# Patient Record
Sex: FEMALE | Race: WHITE | NOT HISPANIC OR LATINO | Employment: OTHER | URBAN - NONMETROPOLITAN AREA
[De-identification: names, ages, dates, MRNs, and addresses within clinical notes are randomized per-mention and may not be internally consistent; named-entity substitution may affect disease eponyms.]

---

## 2017-01-01 ENCOUNTER — HOSPITAL ENCOUNTER (OUTPATIENT)
Facility: HOSPITAL | Age: 72
Setting detail: HOSPITAL OUTPATIENT SURGERY
Discharge: HOME | End: 2017-06-05
Attending: INTERNAL MEDICINE | Admitting: INTERNAL MEDICINE

## 2017-01-01 ENCOUNTER — DOCUMENTATION (OUTPATIENT)
Dept: CARDIAC SURGERY | Facility: CLINIC | Age: 72
End: 2017-01-01

## 2017-01-01 ENCOUNTER — HOSPITAL ENCOUNTER (OUTPATIENT)
Dept: CT IMAGING | Facility: HOSPITAL | Age: 72
Discharge: HOME | End: 2017-10-12
Attending: THORACIC SURGERY (CARDIOTHORACIC VASCULAR SURGERY) | Admitting: THORACIC SURGERY (CARDIOTHORACIC VASCULAR SURGERY)

## 2017-01-01 ENCOUNTER — HOSPITAL ENCOUNTER (OUTPATIENT)
Dept: CT IMAGING | Facility: HOSPITAL | Age: 72
Discharge: HOME | End: 2017-11-13
Attending: INTERNAL MEDICINE | Admitting: INTERNAL MEDICINE

## 2017-01-01 ENCOUNTER — INFUSION (OUTPATIENT)
Dept: ONCOLOGY | Facility: HOSPITAL | Age: 72
End: 2017-01-01

## 2017-01-01 ENCOUNTER — HOSPITAL ENCOUNTER (OUTPATIENT)
Dept: NUCLEAR MEDICINE | Facility: HOSPITAL | Age: 72
Discharge: HOME | End: 2017-11-06
Attending: INTERNAL MEDICINE

## 2017-01-01 ENCOUNTER — DOCUMENTATION (OUTPATIENT)
Dept: ONCOLOGY | Facility: HOSPITAL | Age: 72
End: 2017-01-01

## 2017-01-01 ENCOUNTER — ANESTHESIA (OUTPATIENT)
Dept: GASTROENTEROLOGY | Facility: HOSPITAL | Age: 72
End: 2017-01-01

## 2017-01-01 ENCOUNTER — APPOINTMENT (OUTPATIENT)
Dept: ONCOLOGY | Facility: HOSPITAL | Age: 72
End: 2017-01-01

## 2017-01-01 ENCOUNTER — OFFICE VISIT (OUTPATIENT)
Dept: CARDIAC SURGERY | Facility: CLINIC | Age: 72
End: 2017-01-01

## 2017-01-01 ENCOUNTER — APPOINTMENT (OUTPATIENT)
Dept: CT IMAGING | Facility: HOSPITAL | Age: 72
End: 2017-01-01
Attending: INTERNAL MEDICINE

## 2017-01-01 ENCOUNTER — HOSPITAL ENCOUNTER (OUTPATIENT)
Dept: MRI IMAGING | Facility: HOSPITAL | Age: 72
Discharge: HOME | End: 2017-12-05
Attending: INTERNAL MEDICINE | Admitting: INTERNAL MEDICINE

## 2017-01-01 ENCOUNTER — HOSPITAL ENCOUNTER (OUTPATIENT)
Dept: CT IMAGING | Facility: HOSPITAL | Age: 72
End: 2017-01-01
Attending: INTERNAL MEDICINE

## 2017-01-01 ENCOUNTER — TELEPHONE (OUTPATIENT)
Dept: CARDIAC SURGERY | Facility: CLINIC | Age: 72
End: 2017-01-01

## 2017-01-01 ENCOUNTER — TRANSCRIBE ORDERS (OUTPATIENT)
Dept: ADMINISTRATIVE | Facility: HOSPITAL | Age: 72
End: 2017-01-01

## 2017-01-01 ENCOUNTER — HOSPITAL ENCOUNTER (OUTPATIENT)
Dept: CT IMAGING | Facility: HOSPITAL | Age: 72
Discharge: HOME | End: 2017-08-10
Attending: INTERNAL MEDICINE | Admitting: INTERNAL MEDICINE

## 2017-01-01 ENCOUNTER — ANESTHESIA EVENT (OUTPATIENT)
Dept: GASTROENTEROLOGY | Facility: HOSPITAL | Age: 72
End: 2017-01-01

## 2017-01-01 ENCOUNTER — HOSPITAL ENCOUNTER (OUTPATIENT)
Dept: GENERAL RADIOLOGY | Facility: HOSPITAL | Age: 72
Discharge: HOME | End: 2017-10-12

## 2017-01-01 ENCOUNTER — HOSPITAL ENCOUNTER (OUTPATIENT)
Dept: CT IMAGING | Facility: HOSPITAL | Age: 72
Discharge: HOME | End: 2017-11-06
Attending: INTERNAL MEDICINE | Admitting: INTERNAL MEDICINE

## 2017-01-01 VITALS
SYSTOLIC BLOOD PRESSURE: 156 MMHG | OXYGEN SATURATION: 96 % | TEMPERATURE: 99 F | DIASTOLIC BLOOD PRESSURE: 67 MMHG | WEIGHT: 122 LBS | RESPIRATION RATE: 20 BRPM | HEART RATE: 55 BPM | HEIGHT: 65 IN | BODY MASS INDEX: 20.33 KG/M2

## 2017-01-01 VITALS
HEART RATE: 116 BPM | OXYGEN SATURATION: 94 % | TEMPERATURE: 94 F | BODY MASS INDEX: 21.16 KG/M2 | WEIGHT: 127 LBS | DIASTOLIC BLOOD PRESSURE: 39 MMHG | HEIGHT: 65 IN | SYSTOLIC BLOOD PRESSURE: 91 MMHG | RESPIRATION RATE: 20 BRPM

## 2017-01-01 VITALS
RESPIRATION RATE: 20 BRPM | SYSTOLIC BLOOD PRESSURE: 135 MMHG | BODY MASS INDEX: 21.16 KG/M2 | DIASTOLIC BLOOD PRESSURE: 44 MMHG | WEIGHT: 127 LBS | TEMPERATURE: 97.4 F | OXYGEN SATURATION: 97 % | HEIGHT: 65 IN | HEART RATE: 67 BPM

## 2017-01-01 VITALS
DIASTOLIC BLOOD PRESSURE: 62 MMHG | WEIGHT: 124 LBS | HEIGHT: 65 IN | TEMPERATURE: 97.5 F | HEART RATE: 54 BPM | BODY MASS INDEX: 20.66 KG/M2 | RESPIRATION RATE: 18 BRPM | OXYGEN SATURATION: 94 % | SYSTOLIC BLOOD PRESSURE: 118 MMHG

## 2017-01-01 VITALS
DIASTOLIC BLOOD PRESSURE: 66 MMHG | TEMPERATURE: 97.4 F | WEIGHT: 120 LBS | OXYGEN SATURATION: 92 % | SYSTOLIC BLOOD PRESSURE: 126 MMHG | HEIGHT: 65 IN | BODY MASS INDEX: 19.99 KG/M2 | HEART RATE: 97 BPM | RESPIRATION RATE: 19 BRPM

## 2017-01-01 VITALS
RESPIRATION RATE: 18 BRPM | TEMPERATURE: 97.8 F | HEART RATE: 69 BPM | SYSTOLIC BLOOD PRESSURE: 150 MMHG | OXYGEN SATURATION: 94 % | DIASTOLIC BLOOD PRESSURE: 51 MMHG

## 2017-01-01 VITALS
OXYGEN SATURATION: 98 % | HEART RATE: 70 BPM | SYSTOLIC BLOOD PRESSURE: 140 MMHG | BODY MASS INDEX: 20.33 KG/M2 | WEIGHT: 122 LBS | DIASTOLIC BLOOD PRESSURE: 74 MMHG | HEIGHT: 65 IN

## 2017-01-01 DIAGNOSIS — C34.90 NON-SMALL CELL CARCINOMA OF LUNG (HCC): Primary | ICD-10-CM

## 2017-01-01 DIAGNOSIS — C34.11 MALIGNANT NEOPLASM OF UPPER LOBE OF RIGHT LUNG (HCC): Primary | ICD-10-CM

## 2017-01-01 DIAGNOSIS — R91.1 NODULE OF RIGHT LUNG: ICD-10-CM

## 2017-01-01 DIAGNOSIS — D50.9 IRON DEFICIENCY ANEMIA, UNSPECIFIED IRON DEFICIENCY ANEMIA TYPE: ICD-10-CM

## 2017-01-01 DIAGNOSIS — C34.11 MALIGNANT NEOPLASM OF UPPER LOBE OF RIGHT LUNG (HCC): ICD-10-CM

## 2017-01-01 DIAGNOSIS — R91.8 LUNG MASS: ICD-10-CM

## 2017-01-01 DIAGNOSIS — J98.4 CAVITATING MASS OF LUNG: Primary | ICD-10-CM

## 2017-01-01 DIAGNOSIS — J44.9 CHRONIC OBSTRUCTIVE PULMONARY DISEASE, UNSPECIFIED COPD TYPE (HCC): ICD-10-CM

## 2017-01-01 DIAGNOSIS — J18.9 PNEUMONIA: ICD-10-CM

## 2017-01-01 DIAGNOSIS — R91.1 NODULE OF RIGHT LUNG: Primary | ICD-10-CM

## 2017-01-01 DIAGNOSIS — C34.90 MALIGNANT NEOPLASM OF LUNG, UNSPECIFIED LATERALITY, UNSPECIFIED PART OF LUNG (HCC): Primary | ICD-10-CM

## 2017-01-01 DIAGNOSIS — R20.0 NUMBNESS OF TONGUE: ICD-10-CM

## 2017-01-01 DIAGNOSIS — C34.90 NON-SMALL CELL CARCINOMA OF LUNG (HCC): ICD-10-CM

## 2017-01-01 DIAGNOSIS — R20.0 NUMBNESS OF TONGUE: Primary | ICD-10-CM

## 2017-01-01 LAB
APTT PPP: 40.2 SECONDS (ref 24.1–34.8)
AUTO MIXED CELLS #: 0.7 10*3/MM3 (ref 0.1–2.6)
AUTO MIXED CELLS %: 15.9 % (ref 0.1–24)
CREAT BLDA-MCNC: 1.1 MG/DL (ref 0.6–1.3)
CREAT SERPL-MCNC: 0.98 MG/DL
CREAT SERPL-MCNC: 1.1 MG/DL
CREAT SERPL-MCNC: 1.1 MG/DL
CYTO UR: NORMAL
ERYTHROCYTE [DISTWIDTH] IN BLOOD BY AUTOMATED COUNT: 12.7 % (ref 12–15)
HCT VFR BLD AUTO: 31.2 % (ref 37–47)
HGB BLD-MCNC: 10.3 G/DL (ref 12–16)
INR PPP: 0.88 (ref 0.91–1.09)
LAB AP CASE REPORT: NORMAL
LAB AP CLINICAL INFORMATION: NORMAL
LYMPHOCYTES # BLD AUTO: 1 10*3/MM3 (ref 0.8–7)
LYMPHOCYTES NFR BLD AUTO: 23.2 % (ref 15–45)
Lab: NORMAL
MCH RBC QN AUTO: 31.3 PG (ref 28–32)
MCHC RBC AUTO-ENTMCNC: 33 G/DL (ref 33–36)
MCV RBC AUTO: 94.8 FL (ref 82–98)
NEUTROPHILS # BLD AUTO: 2.6 10*3/MM3 (ref 1.5–8.3)
NEUTROPHILS NFR BLD AUTO: 60.9 % (ref 39–78)
PATH REPORT.FINAL DX SPEC: NORMAL
PATH REPORT.GROSS SPEC: NORMAL
PLATELET # BLD AUTO: 153 10*3/MM3 (ref 130–400)
PLATELET # BLD AUTO: 229 10*3/MM3 (ref 130–400)
PMV BLD AUTO: 8.9 FL (ref 6–12)
PROTHROMBIN TIME: 12.2 SECONDS (ref 11.9–14.6)
RBC # BLD AUTO: 3.29 10*6/MM3 (ref 4.2–5.4)
WBC NRBC COR # BLD: 4.3 10*3/MM3 (ref 4.8–10.8)

## 2017-01-01 PROCEDURE — 88341 IMHCHEM/IMCYTCHM EA ADD ANTB: CPT | Performed by: THORACIC SURGERY (CARDIOTHORACIC VASCULAR SURGERY)

## 2017-01-01 PROCEDURE — 96413 CHEMO IV INFUSION 1 HR: CPT

## 2017-01-01 PROCEDURE — 96367 TX/PROPH/DG ADDL SEQ IV INF: CPT

## 2017-01-01 PROCEDURE — 25010000002 HEPARIN FLUSH (PORCINE) 100 UNIT/ML SOLUTION: Performed by: INTERNAL MEDICINE

## 2017-01-01 PROCEDURE — 25010000002 DEXAMETHASONE PER 1 MG: Performed by: INTERNAL MEDICINE

## 2017-01-01 PROCEDURE — 0 TECHNETIUM OXIDRONATE KIT: Performed by: INTERNAL MEDICINE

## 2017-01-01 PROCEDURE — 96374 THER/PROPH/DIAG INJ IV PUSH: CPT

## 2017-01-01 PROCEDURE — 85610 PROTHROMBIN TIME: CPT | Performed by: THORACIC SURGERY (CARDIOTHORACIC VASCULAR SURGERY)

## 2017-01-01 PROCEDURE — 88173 CYTOPATH EVAL FNA REPORT: CPT | Performed by: THORACIC SURGERY (CARDIOTHORACIC VASCULAR SURGERY)

## 2017-01-01 PROCEDURE — 88342 IMHCHEM/IMCYTCHM 1ST ANTB: CPT | Performed by: THORACIC SURGERY (CARDIOTHORACIC VASCULAR SURGERY)

## 2017-01-01 PROCEDURE — A9561 TC99M OXIDRONATE: HCPCS | Performed by: INTERNAL MEDICINE

## 2017-01-01 PROCEDURE — 25010000002 CARBOPLATIN PER 50 MG: Performed by: INTERNAL MEDICINE

## 2017-01-01 PROCEDURE — 85730 THROMBOPLASTIN TIME PARTIAL: CPT | Performed by: THORACIC SURGERY (CARDIOTHORACIC VASCULAR SURGERY)

## 2017-01-01 PROCEDURE — 96417 CHEMO IV INFUS EACH ADDL SEQ: CPT

## 2017-01-01 PROCEDURE — 96375 TX/PRO/DX INJ NEW DRUG ADDON: CPT

## 2017-01-01 PROCEDURE — 88305 TISSUE EXAM BY PATHOLOGIST: CPT | Performed by: THORACIC SURGERY (CARDIOTHORACIC VASCULAR SURGERY)

## 2017-01-01 PROCEDURE — 25010000002 PALONOSETRON PER 25 MCG: Performed by: INTERNAL MEDICINE

## 2017-01-01 PROCEDURE — 0 GADOBENATE DIMEGLUMINE 529 MG/ML SOLUTION: Performed by: INTERNAL MEDICINE

## 2017-01-01 PROCEDURE — A9577 INJ MULTIHANCE: HCPCS | Performed by: INTERNAL MEDICINE

## 2017-01-01 PROCEDURE — 25010000002 PACLITAXEL PROTEIN-BOUND PART PER 1 MG: Performed by: INTERNAL MEDICINE

## 2017-01-01 PROCEDURE — 25010000002 FENTANYL CITRATE (PF) 100 MCG/2ML SOLUTION: Performed by: RADIOLOGY

## 2017-01-01 PROCEDURE — 88305 TISSUE EXAM BY PATHOLOGIST: CPT | Performed by: INTERNAL MEDICINE

## 2017-01-01 PROCEDURE — 78306 BONE IMAGING WHOLE BODY: CPT

## 2017-01-01 PROCEDURE — 25010000002 DEXAMETHASONE PER 1 MG: Performed by: NURSE PRACTITIONER

## 2017-01-01 PROCEDURE — 71020 HC CHEST PA AND LATERAL: CPT

## 2017-01-01 PROCEDURE — 25010000002 MIDAZOLAM PER 1 MG: Performed by: NURSE ANESTHETIST, CERTIFIED REGISTERED

## 2017-01-01 PROCEDURE — 70553 MRI BRAIN STEM W/O & W/DYE: CPT

## 2017-01-01 PROCEDURE — 0 FLUDEOXYGLUCOSE F18 SOLUTION: Performed by: INTERNAL MEDICINE

## 2017-01-01 PROCEDURE — 74177 CT ABD & PELVIS W/CONTRAST: CPT

## 2017-01-01 PROCEDURE — 71260 CT THORAX DX C+: CPT

## 2017-01-01 PROCEDURE — 78815 PET IMAGE W/CT SKULL-THIGH: CPT

## 2017-01-01 PROCEDURE — 45385 COLONOSCOPY W/LESION REMOVAL: CPT | Performed by: INTERNAL MEDICINE

## 2017-01-01 PROCEDURE — 82565 ASSAY OF CREATININE: CPT

## 2017-01-01 PROCEDURE — 99213 OFFICE O/P EST LOW 20 MIN: CPT | Performed by: THORACIC SURGERY (CARDIOTHORACIC VASCULAR SURGERY)

## 2017-01-01 PROCEDURE — 88172 CYTP DX EVAL FNA 1ST EA SITE: CPT | Performed by: THORACIC SURGERY (CARDIOTHORACIC VASCULAR SURGERY)

## 2017-01-01 PROCEDURE — 88334 PATH CONSLTJ SURG CYTO XM EA: CPT | Performed by: THORACIC SURGERY (CARDIOTHORACIC VASCULAR SURGERY)

## 2017-01-01 PROCEDURE — 0 IOPAMIDOL 61 % SOLUTION: Performed by: INTERNAL MEDICINE

## 2017-01-01 PROCEDURE — 85025 COMPLETE CBC W/AUTO DIFF WBC: CPT

## 2017-01-01 PROCEDURE — 25010000002 PACLITAXEL PROTEIN-BOUND PART PER 1 MG: Performed by: NURSE PRACTITIONER

## 2017-01-01 PROCEDURE — 25010000002 PROPOFOL 10 MG/ML EMULSION: Performed by: NURSE ANESTHETIST, CERTIFIED REGISTERED

## 2017-01-01 PROCEDURE — 77012 CT SCAN FOR NEEDLE BIOPSY: CPT

## 2017-01-01 PROCEDURE — 71250 CT THORAX DX C-: CPT

## 2017-01-01 PROCEDURE — 25010000002 PALONOSETRON PER 25 MCG: Performed by: NURSE PRACTITIONER

## 2017-01-01 PROCEDURE — 36415 COLL VENOUS BLD VENIPUNCTURE: CPT

## 2017-01-01 PROCEDURE — 85049 AUTOMATED PLATELET COUNT: CPT | Performed by: THORACIC SURGERY (CARDIOTHORACIC VASCULAR SURGERY)

## 2017-01-01 PROCEDURE — 25010000002 MIDAZOLAM PER 1 MG: Performed by: RADIOLOGY

## 2017-01-01 PROCEDURE — A9552 F18 FDG: HCPCS | Performed by: INTERNAL MEDICINE

## 2017-01-01 RX ORDER — PROPOFOL 10 MG/ML
VIAL (ML) INTRAVENOUS AS NEEDED
Status: DISCONTINUED | OUTPATIENT
Start: 2017-01-01 | End: 2017-01-01 | Stop reason: SURG

## 2017-01-01 RX ORDER — DIPHENHYDRAMINE HYDROCHLORIDE 50 MG/ML
50 INJECTION INTRAMUSCULAR; INTRAVENOUS AS NEEDED
Status: CANCELLED
Start: 2017-01-01

## 2017-01-01 RX ORDER — LIDOCAINE HYDROCHLORIDE 20 MG/ML
INJECTION, SOLUTION INFILTRATION; PERINEURAL AS NEEDED
Status: DISCONTINUED | OUTPATIENT
Start: 2017-01-01 | End: 2017-01-01 | Stop reason: SURG

## 2017-01-01 RX ORDER — MIDAZOLAM HYDROCHLORIDE 1 MG/ML
INJECTION INTRAMUSCULAR; INTRAVENOUS
Status: COMPLETED | OUTPATIENT
Start: 2017-01-01 | End: 2017-01-01

## 2017-01-01 RX ORDER — SODIUM CHLORIDE, SODIUM LACTATE, POTASSIUM CHLORIDE, CALCIUM CHLORIDE 600; 310; 30; 20 MG/100ML; MG/100ML; MG/100ML; MG/100ML
INJECTION, SOLUTION INTRAVENOUS CONTINUOUS PRN
Status: DISCONTINUED | OUTPATIENT
Start: 2017-01-01 | End: 2017-01-01 | Stop reason: SURG

## 2017-01-01 RX ORDER — SODIUM CHLORIDE 0.9 % (FLUSH) 0.9 %
10 SYRINGE (ML) INJECTION AS NEEDED
Status: CANCELLED | OUTPATIENT
Start: 2017-01-01

## 2017-01-01 RX ORDER — SODIUM CHLORIDE 0.9 % (FLUSH) 0.9 %
10 SYRINGE (ML) INJECTION AS NEEDED
Status: DISCONTINUED | OUTPATIENT
Start: 2017-01-01 | End: 2017-01-01 | Stop reason: HOSPADM

## 2017-01-01 RX ORDER — PACLITAXEL 100 MG/20ML
145 INJECTION, POWDER, LYOPHILIZED, FOR SUSPENSION INTRAVENOUS ONCE
Status: CANCELLED | OUTPATIENT
Start: 2017-01-01

## 2017-01-01 RX ORDER — METHYLPREDNISOLONE SODIUM SUCCINATE 125 MG/2ML
125 INJECTION, POWDER, LYOPHILIZED, FOR SOLUTION INTRAMUSCULAR; INTRAVENOUS AS NEEDED
Status: CANCELLED
Start: 2017-01-01

## 2017-01-01 RX ORDER — LIDOCAINE HYDROCHLORIDE 10 MG/ML
0.5 INJECTION, SOLUTION INFILTRATION; PERINEURAL ONCE AS NEEDED
Status: COMPLETED | OUTPATIENT
Start: 2017-01-01 | End: 2017-01-01

## 2017-01-01 RX ORDER — PACLITAXEL 100 MG/20ML
145 INJECTION, POWDER, LYOPHILIZED, FOR SUSPENSION INTRAVENOUS ONCE
Status: COMPLETED | OUTPATIENT
Start: 2017-01-01 | End: 2017-01-01

## 2017-01-01 RX ORDER — SODIUM CHLORIDE 9 MG/ML
500 INJECTION, SOLUTION INTRAVENOUS CONTINUOUS PRN
Status: DISCONTINUED | OUTPATIENT
Start: 2017-01-01 | End: 2017-01-01 | Stop reason: HOSPADM

## 2017-01-01 RX ORDER — PALONOSETRON 0.05 MG/ML
0.25 INJECTION, SOLUTION INTRAVENOUS ONCE
Status: COMPLETED | OUTPATIENT
Start: 2017-01-01 | End: 2017-01-01

## 2017-01-01 RX ORDER — SODIUM CHLORIDE 0.9 % (FLUSH) 0.9 %
1-10 SYRINGE (ML) INJECTION AS NEEDED
Status: CANCELLED | OUTPATIENT
Start: 2017-01-01

## 2017-01-01 RX ORDER — ONDANSETRON 2 MG/ML
4 INJECTION INTRAMUSCULAR; INTRAVENOUS ONCE AS NEEDED
Status: DISCONTINUED | OUTPATIENT
Start: 2017-01-01 | End: 2017-01-01 | Stop reason: HOSPADM

## 2017-01-01 RX ORDER — DIPHENHYDRAMINE HYDROCHLORIDE 50 MG/ML
50 INJECTION INTRAMUSCULAR; INTRAVENOUS AS NEEDED
Status: DISCONTINUED | OUTPATIENT
Start: 2017-01-01 | End: 2017-01-01 | Stop reason: HOSPADM

## 2017-01-01 RX ORDER — METHYLPREDNISOLONE SODIUM SUCCINATE 125 MG/2ML
125 INJECTION, POWDER, LYOPHILIZED, FOR SOLUTION INTRAMUSCULAR; INTRAVENOUS AS NEEDED
Status: DISCONTINUED | OUTPATIENT
Start: 2017-01-01 | End: 2017-01-01 | Stop reason: HOSPADM

## 2017-01-01 RX ORDER — PALONOSETRON 0.05 MG/ML
0.25 INJECTION, SOLUTION INTRAVENOUS ONCE
Status: CANCELLED | OUTPATIENT
Start: 2017-01-01

## 2017-01-01 RX ORDER — LIDOCAINE HYDROCHLORIDE 10 MG/ML
INJECTION, SOLUTION INFILTRATION; PERINEURAL
Status: COMPLETED | OUTPATIENT
Start: 2017-01-01 | End: 2017-01-01

## 2017-01-01 RX ORDER — FENTANYL CITRATE 50 UG/ML
INJECTION, SOLUTION INTRAMUSCULAR; INTRAVENOUS
Status: COMPLETED | OUTPATIENT
Start: 2017-01-01 | End: 2017-01-01

## 2017-01-01 RX ORDER — SODIUM CHLORIDE 0.9 % (FLUSH) 0.9 %
1-10 SYRINGE (ML) INJECTION AS NEEDED
Status: DISCONTINUED | OUTPATIENT
Start: 2017-01-01 | End: 2017-01-01 | Stop reason: HOSPADM

## 2017-01-01 RX ORDER — DORZOLAMIDE HYDROCHLORIDE AND TIMOLOL MALEATE 20; 5 MG/ML; MG/ML
1 SOLUTION/ DROPS OPHTHALMIC 2 TIMES DAILY
COMMUNITY

## 2017-01-01 RX ORDER — FERROUS SULFATE 325(65) MG
325 TABLET ORAL EVERY 12 HOURS
COMMUNITY

## 2017-01-01 RX ORDER — SODIUM CHLORIDE 0.9 % (FLUSH) 0.9 %
3 SYRINGE (ML) INJECTION AS NEEDED
Status: DISCONTINUED | OUTPATIENT
Start: 2017-01-01 | End: 2017-01-01 | Stop reason: HOSPADM

## 2017-01-01 RX ORDER — MIDAZOLAM HYDROCHLORIDE 1 MG/ML
INJECTION INTRAMUSCULAR; INTRAVENOUS AS NEEDED
Status: DISCONTINUED | OUTPATIENT
Start: 2017-01-01 | End: 2017-01-01 | Stop reason: SURG

## 2017-01-01 RX ORDER — SODIUM CHLORIDE 9 MG/ML
9 INJECTION, SOLUTION INTRAVENOUS CONTINUOUS PRN
Status: CANCELLED | OUTPATIENT
Start: 2017-01-01

## 2017-01-01 RX ADMIN — MIDAZOLAM 1 MG: 1 INJECTION INTRAMUSCULAR; INTRAVENOUS at 13:01

## 2017-01-01 RX ADMIN — DEXAMETHASONE SODIUM PHOSPHATE 12 MG: 4 INJECTION, SOLUTION INTRAMUSCULAR; INTRAVENOUS at 09:40

## 2017-01-01 RX ADMIN — Medication 500 UNITS: at 11:55

## 2017-01-01 RX ADMIN — PACLITAXEL 145 MG: 100 INJECTION, POWDER, LYOPHILIZED, FOR SUSPENSION INTRAVENOUS at 10:11

## 2017-01-01 RX ADMIN — PALONOSETRON HYDROCHLORIDE 0.25 MG: 0.25 INJECTION INTRAVENOUS at 10:17

## 2017-01-01 RX ADMIN — SODIUM CHLORIDE 250 ML: 9 INJECTION, SOLUTION INTRAVENOUS at 09:23

## 2017-01-01 RX ADMIN — PALONOSETRON HYDROCHLORIDE 0.25 MG: 0.25 INJECTION INTRAVENOUS at 09:23

## 2017-01-01 RX ADMIN — PALONOSETRON HYDROCHLORIDE 0.25 MG: 0.25 INJECTION INTRAVENOUS at 10:52

## 2017-01-01 RX ADMIN — DEXAMETHASONE SODIUM PHOSPHATE 12 MG: 4 INJECTION, SOLUTION INTRAMUSCULAR; INTRAVENOUS at 09:26

## 2017-01-01 RX ADMIN — Medication 10 ML: at 12:50

## 2017-01-01 RX ADMIN — SODIUM CHLORIDE 250 ML: 9 INJECTION, SOLUTION INTRAVENOUS at 10:16

## 2017-01-01 RX ADMIN — MIDAZOLAM HYDROCHLORIDE 5 MG: 1 INJECTION, SOLUTION INTRAMUSCULAR; INTRAVENOUS at 07:53

## 2017-01-01 RX ADMIN — FLUDEOXYGLUCOSE F18 1 DOSE: 300 INJECTION INTRAVENOUS at 11:00

## 2017-01-01 RX ADMIN — CARBOPLATIN 260 MG: 10 INJECTION, SOLUTION INTRAVENOUS at 10:49

## 2017-01-01 RX ADMIN — Medication 10 ML: at 10:46

## 2017-01-01 RX ADMIN — LIDOCAINE HYDROCHLORIDE 40 MG: 20 INJECTION, SOLUTION INFILTRATION; PERINEURAL at 07:56

## 2017-01-01 RX ADMIN — PACLITAXEL 145 MG: 100 INJECTION, POWDER, LYOPHILIZED, FOR SUSPENSION INTRAVENOUS at 09:47

## 2017-01-01 RX ADMIN — SODIUM CHLORIDE 250 ML: 9 INJECTION, SOLUTION INTRAVENOUS at 09:55

## 2017-01-01 RX ADMIN — IOPAMIDOL 150 ML: 612 INJECTION, SOLUTION INTRAVENOUS at 08:15

## 2017-01-01 RX ADMIN — Medication 10 ML: at 11:54

## 2017-01-01 RX ADMIN — DEXAMETHASONE SODIUM PHOSPHATE 12 MG: 4 INJECTION, SOLUTION INTRAMUSCULAR; INTRAVENOUS at 10:25

## 2017-01-01 RX ADMIN — DEXAMETHASONE SODIUM PHOSPHATE 12 MG: 4 INJECTION, SOLUTION INTRAMUSCULAR; INTRAVENOUS at 10:57

## 2017-01-01 RX ADMIN — SODIUM CHLORIDE 250 ML: 9 INJECTION, SOLUTION INTRAVENOUS at 09:30

## 2017-01-01 RX ADMIN — CARBOPLATIN 260 MG: 10 INJECTION, SOLUTION INTRAVENOUS at 12:02

## 2017-01-01 RX ADMIN — PALONOSETRON HYDROCHLORIDE 0.25 MG: 0.25 INJECTION INTRAVENOUS at 09:42

## 2017-01-01 RX ADMIN — GADOBENATE DIMEGLUMINE 10 ML: 529 INJECTION, SOLUTION INTRAVENOUS at 14:45

## 2017-01-01 RX ADMIN — PROPOFOL 60 MG: 10 INJECTION, EMULSION INTRAVENOUS at 07:56

## 2017-01-01 RX ADMIN — TECHNETIUM TC 99M OXIDRONATE 1 DOSE: 3.15 INJECTION, POWDER, LYOPHILIZED, FOR SOLUTION INTRAVENOUS at 07:30

## 2017-01-01 RX ADMIN — SODIUM CHLORIDE, POTASSIUM CHLORIDE, SODIUM LACTATE AND CALCIUM CHLORIDE: 600; 310; 30; 20 INJECTION, SOLUTION INTRAVENOUS at 07:49

## 2017-01-01 RX ADMIN — Medication 10 ML: at 11:19

## 2017-01-01 RX ADMIN — Medication 500 UNITS: at 11:19

## 2017-01-01 RX ADMIN — PROPOFOL 10 MG: 10 INJECTION, EMULSION INTRAVENOUS at 08:00

## 2017-01-01 RX ADMIN — SODIUM CHLORIDE 500 ML: 9 INJECTION, SOLUTION INTRAVENOUS at 07:35

## 2017-01-01 RX ADMIN — PACLITAXEL 145 MG: 100 INJECTION, POWDER, LYOPHILIZED, FOR SUSPENSION INTRAVENOUS at 11:21

## 2017-01-01 RX ADMIN — Medication 500 UNITS: at 12:50

## 2017-01-01 RX ADMIN — LIDOCAINE HYDROCHLORIDE 10 ML: 10 INJECTION, SOLUTION INFILTRATION; PERINEURAL at 13:01

## 2017-01-01 RX ADMIN — PACLITAXEL 145 MG: 100 INJECTION, POWDER, LYOPHILIZED, FOR SUSPENSION INTRAVENOUS at 10:59

## 2017-01-01 RX ADMIN — FENTANYL CITRATE 25 MCG: 50 INJECTION, SOLUTION INTRAMUSCULAR; INTRAVENOUS at 13:01

## 2017-01-01 RX ADMIN — Medication 500 UNITS: at 10:46

## 2017-01-01 RX ADMIN — LIDOCAINE HYDROCHLORIDE 0.5 ML: 10 INJECTION, SOLUTION INFILTRATION; PERINEURAL at 07:34

## 2017-01-01 RX ADMIN — PROPOFOL 40 MG: 10 INJECTION, EMULSION INTRAVENOUS at 08:08

## 2017-01-10 ENCOUNTER — HOSPITAL ENCOUNTER (OUTPATIENT)
Dept: CT IMAGING | Facility: HOSPITAL | Age: 72
Discharge: HOME | End: 2017-01-10
Attending: INTERNAL MEDICINE | Admitting: INTERNAL MEDICINE

## 2017-01-10 DIAGNOSIS — R91.8 LUNG MASS: ICD-10-CM

## 2017-01-10 PROCEDURE — 71250 CT THORAX DX C-: CPT

## 2017-02-23 ENCOUNTER — TRANSCRIBE ORDERS (OUTPATIENT)
Dept: ADMINISTRATIVE | Facility: HOSPITAL | Age: 72
End: 2017-02-23

## 2017-02-23 DIAGNOSIS — J44.9 CHRONIC OBSTRUCTIVE PULMONARY DISEASE, UNSPECIFIED COPD TYPE (HCC): Primary | ICD-10-CM

## 2017-02-24 ENCOUNTER — HOSPITAL ENCOUNTER (INPATIENT)
Dept: HOSPITAL 58 - MEDSURG A | Age: 72
LOS: 4 days | Discharge: HOME | DRG: 153 | End: 2017-02-28
Attending: INTERNAL MEDICINE | Admitting: INTERNAL MEDICINE

## 2017-02-24 VITALS — BODY MASS INDEX: 19.3 KG/M2

## 2017-02-24 DIAGNOSIS — E78.5: ICD-10-CM

## 2017-02-24 DIAGNOSIS — M15.9: ICD-10-CM

## 2017-02-24 DIAGNOSIS — Z79.899: ICD-10-CM

## 2017-02-24 DIAGNOSIS — Z79.01: ICD-10-CM

## 2017-02-24 DIAGNOSIS — F17.210: ICD-10-CM

## 2017-02-24 DIAGNOSIS — J06.9: Primary | ICD-10-CM

## 2017-02-24 DIAGNOSIS — R19.5: ICD-10-CM

## 2017-02-24 DIAGNOSIS — I10: ICD-10-CM

## 2017-02-24 DIAGNOSIS — J44.9: ICD-10-CM

## 2017-02-24 DIAGNOSIS — F32.9: ICD-10-CM

## 2017-02-24 DIAGNOSIS — D50.0: ICD-10-CM

## 2017-02-24 DIAGNOSIS — J98.4: ICD-10-CM

## 2017-02-24 DIAGNOSIS — R06.02: ICD-10-CM

## 2017-02-24 DIAGNOSIS — I71.4: ICD-10-CM

## 2017-02-24 DIAGNOSIS — R09.02: ICD-10-CM

## 2017-02-24 LAB
ADD URINE MICROSCOPIC: YES
ALBUMIN SERPL-MCNC: 2.8 G/DL (ref 3.4–5)
ALBUMIN/GLOB SERPL: 0.65 {RATIO}
ALP SERPL-CCNC: 85 U/L (ref 53–141)
ALT SERPL-CCNC: 11 U/L (ref 12–78)
ANION GAP SERPL CALC-SCNC: 18.1 MMOL/L
AST SERPL-CCNC: 24 U/L (ref 15–37)
BASE EXCESS STD BLDA CALC-SCNC: 6 MMOL/L (ref -2–2)
BASOPHILS # BLD AUTO: 0 K/UL (ref 0–0.2)
BASOPHILS NFR BLD AUTO: 0.2 % (ref 0–3)
BILIRUB SERPL-MCNC: 0.22 MG/DL (ref 0–1.2)
BUN SERPL-MCNC: 14 MG/DL (ref 7–18)
BUN/CREAT SERPL: 17.07
CALCIUM SERPL-MCNC: 10.3 MG/DL (ref 8.2–10.2)
CHLORIDE SERPL-SCNC: 90 MMOL/L (ref 98–107)
CK SERPL-CCNC: 114 U/L
CK SERPL-CCNC: 90 U/L
CO2 BLD-SCNC: 29 MMOL/L (ref 23–31)
CO2 BLDA CALC-SCNC: 32 MMOL/L (ref 22–28)
CREAT SERPL-MCNC: 0.82 MG/DL (ref 0.6–1.3)
EOSINOPHIL # BLD AUTO: 0.1 K/UL (ref 0–0.7)
EOSINOPHIL NFR BLD AUTO: 0.7 % (ref 0–7)
FLU INTERNAL QC: (no result)
FLUAV AG NPH QL: NEGATIVE
FLUBV AG NPH QL: NEGATIVE
GFR SERPLBLD BASED ON 1.73 SQ M-ARVRAT: 68 ML/MIN
GLOBULIN SER CALC-MCNC: 4.3 G/L
GLUCOSE SERPL-MCNC: 103 MG/DL (ref 82–115)
HCO3 BLDA-SCNC: 30.7 MMOL/L (ref 22–26)
HCT VFR BLD AUTO: 30.5 % (ref 37–47)
HGB BLD-MCNC: 9.6 G/DL (ref 12–16)
IMM GRANULOCYTES NFR BLD AUTO: 1.1 % (ref 0–5)
INHALED O2 CONCENTRATION: 21 %
LYMPHOCYTES # SPEC AUTO: 1.1 K/UL (ref 0.6–3.4)
LYMPHOCYTES NFR BLD AUTO: 9.3 % (ref 10–50)
MCH RBC QN: 30.1 PG (ref 27–31)
MCHC RBC AUTO-ENTMCNC: 31.5 G/DL (ref 31.8–35.4)
MCV RBC: 95.6 FL (ref 81–99)
MONOCYTES # BLD AUTO: 1.2 K/UL (ref 0.4–2)
MONOCYTES NFR BLD AUTO: 9.7 % (ref 0–10)
MYOGLOBIN SERPL-MCNC: 60 NG/ML
MYOGLOBIN SERPL-MCNC: 83 NG/ML
NEUTROPHILS # BLD AUTO: 9.6 K/UL (ref 2–6.9)
NEUTROPHILS NFR BLD AUTO: 79 %
PCO2 BLDA: 48.7 MMHG (ref 35–45)
PH BLDA: 7.41 [PH] (ref 7.35–7.45)
PH UR: 7 [PH] (ref 5–9)
PLATELET # BLD AUTO: 319 10^3/UL (ref 140–440)
PO2 BLDA: 51 MMHG (ref 85–100)
POTASSIUM SERPL-SCNC: 4.1 MMOL/L (ref 3.5–5.1)
PROT SERPL-MCNC: 7.1 G/DL (ref 5.8–8.1)
RBC # BLD AUTO: 3.19 10^6/UL (ref 4.2–5.4)
SAO2 % BLDA FROM PO2: 85 % (ref 95–100)
SODIUM SERPL-SCNC: 133 MMOL/L (ref 136–145)
SP GR UR: 1.01 (ref 1–1.03)
TROPONIN I SERPL-MCNC: 0.05 NG/ML (ref 0–0.4)
TROPONIN I SERPL-MCNC: 0.1 NG/ML (ref 0–0.4)
WBC # BLD AUTO: 12.2 K/UL (ref 4.6–10.2)

## 2017-02-24 PROCEDURE — 82607 VITAMIN B-12: CPT

## 2017-02-24 PROCEDURE — 85045 AUTOMATED RETICULOCYTE COUNT: CPT

## 2017-02-24 PROCEDURE — 83540 ASSAY OF IRON: CPT

## 2017-02-24 PROCEDURE — 81001 URINALYSIS AUTO W/SCOPE: CPT

## 2017-02-24 PROCEDURE — 83880 ASSAY OF NATRIURETIC PEPTIDE: CPT

## 2017-02-24 PROCEDURE — 87040 BLOOD CULTURE FOR BACTERIA: CPT

## 2017-02-24 PROCEDURE — 82550 ASSAY OF CK (CPK): CPT

## 2017-02-24 PROCEDURE — 87070 CULTURE OTHR SPECIMN AEROBIC: CPT

## 2017-02-24 PROCEDURE — 80053 COMPREHEN METABOLIC PANEL: CPT

## 2017-02-24 PROCEDURE — 36415 COLL VENOUS BLD VENIPUNCTURE: CPT

## 2017-02-24 PROCEDURE — 94761 N-INVAS EAR/PLS OXIMETRY MLT: CPT

## 2017-02-24 PROCEDURE — 83874 ASSAY OF MYOGLOBIN: CPT

## 2017-02-24 PROCEDURE — 93005 ELECTROCARDIOGRAM TRACING: CPT

## 2017-02-24 PROCEDURE — 82272 OCCULT BLD FECES 1-3 TESTS: CPT

## 2017-02-24 PROCEDURE — 82746 ASSAY OF FOLIC ACID SERUM: CPT

## 2017-02-24 PROCEDURE — 93010 ELECTROCARDIOGRAM REPORT: CPT

## 2017-02-24 PROCEDURE — 82728 ASSAY OF FERRITIN: CPT

## 2017-02-24 PROCEDURE — 83550 IRON BINDING TEST: CPT

## 2017-02-24 PROCEDURE — 94640 AIRWAY INHALATION TREATMENT: CPT

## 2017-02-24 PROCEDURE — 83690 ASSAY OF LIPASE: CPT

## 2017-02-24 PROCEDURE — 84466 ASSAY OF TRANSFERRIN: CPT

## 2017-02-24 PROCEDURE — 82803 BLOOD GASES ANY COMBINATION: CPT

## 2017-02-24 PROCEDURE — 84484 ASSAY OF TROPONIN QUANT: CPT

## 2017-02-24 PROCEDURE — 85025 COMPLETE CBC W/AUTO DIFF WBC: CPT

## 2017-02-24 PROCEDURE — 82150 ASSAY OF AMYLASE: CPT

## 2017-02-24 PROCEDURE — 87804 INFLUENZA ASSAY W/OPTIC: CPT

## 2017-02-24 RX ADMIN — ENOXAPARIN SODIUM SCH MG: 30 INJECTION SUBCUTANEOUS at 19:53

## 2017-02-24 RX ADMIN — IPRATROPIUM BROMIDE AND ALBUTEROL SULFATE SCH VIAL: .5; 3 SOLUTION RESPIRATORY (INHALATION) at 17:00

## 2017-02-24 RX ADMIN — METHYLPREDNISOLONE SODIUM SUCCINATE SCH MG: 125 INJECTION, POWDER, FOR SOLUTION INTRAMUSCULAR; INTRAVENOUS at 14:26

## 2017-02-24 RX ADMIN — IPRATROPIUM BROMIDE AND ALBUTEROL SCH SPRAY: 20; 100 SPRAY, METERED RESPIRATORY (INHALATION) at 20:47

## 2017-02-24 RX ADMIN — Medication SCH SYR: at 20:51

## 2017-02-24 RX ADMIN — BENZONATATE SCH MG: 100 CAPSULE ORAL at 14:24

## 2017-02-24 RX ADMIN — Medication SCH SYR: at 14:25

## 2017-02-24 RX ADMIN — BENZONATATE SCH: 100 CAPSULE ORAL at 15:00

## 2017-02-24 RX ADMIN — SODIUM CHLORIDE SCH MLS/HR: 0.9 INJECTION, SOLUTION INTRAVENOUS at 15:15

## 2017-02-24 RX ADMIN — METHYLPREDNISOLONE SODIUM SUCCINATE SCH: 125 INJECTION, POWDER, FOR SOLUTION INTRAMUSCULAR; INTRAVENOUS at 13:00

## 2017-02-24 RX ADMIN — IPRATROPIUM BROMIDE AND ALBUTEROL SULFATE SCH VIAL: .5; 3 SOLUTION RESPIRATORY (INHALATION) at 22:44

## 2017-02-24 RX ADMIN — METHYLPREDNISOLONE SODIUM SUCCINATE SCH MG: 125 INJECTION, POWDER, FOR SOLUTION INTRAMUSCULAR; INTRAVENOUS at 21:10

## 2017-02-24 RX ADMIN — IPRATROPIUM BROMIDE AND ALBUTEROL SULFATE SCH: .5; 3 SOLUTION RESPIRATORY (INHALATION) at 13:02

## 2017-02-24 RX ADMIN — LISINOPRIL SCH MG: 10 TABLET ORAL at 20:50

## 2017-02-24 RX ADMIN — BENZONATATE SCH MG: 100 CAPSULE ORAL at 20:49

## 2017-02-24 RX ADMIN — SIMVASTATIN SCH MG: 40 TABLET, FILM COATED ORAL at 20:50

## 2017-02-24 NOTE — CT
EXAM: CT chest without contrast 

  

HISTORY:  Shortness of breath 

  

COMPARISON:  Chest x-ray same day and CT chest 09/30/2013 

  

TECHNIQUE:  Serial axial images of the chest were obtained from the lung apices to the upper abdomen
 without contrast.  These were viewed in multiple planes. 

  

FINDINGS:  The thyroid is normal.  The visualized vessels demonstrates scattered atherosclerotic dis
ease.  The pulmonary arteries are upper limit of normal for size.  The heart is normal in size witho
ut pericardial effusion.  There are mediastinal lymph nodes present with the largest precarinal lymp
h node measuring 1.3 cm in diameter. 

  

There is a right upper lobe cavitary mass with irregular thickness of the wall measuring 4.1 x 4.7 x
 5.8 cm.  The wall is significantly thickened superiorly.  This is in contact with the right lung ap
ex.  This cavitary lesion was identified in 2013, but has significantly increased in size and develo
ped a asymmetrically thickened wall.  The bilateral lungs demonstrate airway thickening with central
 lobular ground-glass nodularity.  The airways are patent.  There is no acute consolidation. 

  

Limited views of the soft tissues in the upper abdomen are unremarkable with mild atherosclerotic di
sease.  The osseous structures are normal. 

  

IMPRESSION: 

1.  Increase in right upper lobe cavitary lesion from 2013 with development of irregular and thicken
ed wall superiorly.  Throughout the remaining lungs, there is scattered small airway thickening and 
central lobular ground-glass nodularity suggestive of small airways inflammation/infection.  These f
indings are suggestive of a infectious cavitary lesion in the right upper lobe, but neoplasm cannot 
be excluded. 

2.  Mildly enlarged mediastinal lymph nodes are likely reactive. 

3.  Scattered atherosclerotic disease.

## 2017-02-24 NOTE — DI
Examination:  Single radiographic image of the chest. 

  

Comparison:  04/01/2015. 

  

Reason for study:  Shortness of breath. 

  

FINDINGS:  No pneumothorax or pleural effusion.  The images are inverted on today's examination.  Th
ere is a developing air space opacity in the right upper lobe without discrete consolidation.  The c
ardiac silhouette is not enlarged.  The imaged osseous structures are unremarkable. 

  

Impression: 

1.  Developing air space opacity in the right upper lobe.  Imaging findings can be seen with pneumon
ia and inflammation. 

2.  No pneumothorax or pleural effusion.

## 2017-02-25 RX ADMIN — HYDROCODONE POLISTIREX AND CHLORPHENIRAMINE POLISTIREX SCH ML: 10; 8 SUSPENSION, EXTENDED RELEASE ORAL at 11:50

## 2017-02-25 RX ADMIN — FERROUS SULFATE TAB EC 324 MG (65 MG FE EQUIVALENT) SCH MG: 324 (65 FE) TABLET DELAYED RESPONSE at 09:41

## 2017-02-25 RX ADMIN — ENOXAPARIN SODIUM SCH MG: 30 INJECTION SUBCUTANEOUS at 10:13

## 2017-02-25 RX ADMIN — ALPRAZOLAM PRN MG: 0.5 TABLET ORAL at 20:22

## 2017-02-25 RX ADMIN — Medication SCH SYR: at 05:08

## 2017-02-25 RX ADMIN — METHYLPREDNISOLONE SODIUM SUCCINATE SCH MG: 125 INJECTION, POWDER, FOR SOLUTION INTRAMUSCULAR; INTRAVENOUS at 05:08

## 2017-02-25 RX ADMIN — SODIUM CHLORIDE SCH MLS/HR: 0.9 INJECTION, SOLUTION INTRAVENOUS at 11:10

## 2017-02-25 RX ADMIN — HYDROCODONE BITARTRATE AND ACETAMINOPHEN PRN TAB: 7.5; 325 TABLET ORAL at 20:21

## 2017-02-25 RX ADMIN — LISINOPRIL SCH MG: 10 TABLET ORAL at 09:40

## 2017-02-25 RX ADMIN — IPRATROPIUM BROMIDE AND ALBUTEROL SULFATE SCH VIAL: .5; 3 SOLUTION RESPIRATORY (INHALATION) at 11:42

## 2017-02-25 RX ADMIN — TIMOLOL MALEATE SCH DROP: 2.5 SOLUTION OPHTHALMIC at 10:32

## 2017-02-25 RX ADMIN — PANTOPRAZOLE SODIUM SCH MG: 40 TABLET, DELAYED RELEASE ORAL at 16:52

## 2017-02-25 RX ADMIN — BENZONATATE SCH MG: 100 CAPSULE ORAL at 20:21

## 2017-02-25 RX ADMIN — IPRATROPIUM BROMIDE AND ALBUTEROL SULFATE SCH VIAL: .5; 3 SOLUTION RESPIRATORY (INHALATION) at 16:52

## 2017-02-25 RX ADMIN — Medication SCH EACH: at 10:20

## 2017-02-25 RX ADMIN — Medication SCH MG: at 15:18

## 2017-02-25 RX ADMIN — METHYLPREDNISOLONE SODIUM SUCCINATE SCH MG: 125 INJECTION, POWDER, FOR SOLUTION INTRAMUSCULAR; INTRAVENOUS at 20:20

## 2017-02-25 RX ADMIN — SIMVASTATIN SCH MG: 40 TABLET, FILM COATED ORAL at 20:21

## 2017-02-25 RX ADMIN — LISINOPRIL SCH MG: 10 TABLET ORAL at 20:21

## 2017-02-25 RX ADMIN — FENOFIBRATE SCH MG: 160 TABLET, FILM COATED ORAL at 09:40

## 2017-02-25 RX ADMIN — HYDROCODONE BITARTRATE AND ACETAMINOPHEN PRN TAB: 7.5; 325 TABLET ORAL at 10:20

## 2017-02-25 RX ADMIN — BENZONATATE SCH MG: 100 CAPSULE ORAL at 15:18

## 2017-02-25 RX ADMIN — CITALOPRAM HYDROBROMIDE SCH MG: 20 TABLET, FILM COATED ORAL at 09:41

## 2017-02-25 RX ADMIN — TRAVOPROST SCH DROP: 0.04 SOLUTION/ DROPS OPHTHALMIC at 20:36

## 2017-02-25 RX ADMIN — METHYLPREDNISOLONE SODIUM SUCCINATE SCH MG: 125 INJECTION, POWDER, FOR SOLUTION INTRAMUSCULAR; INTRAVENOUS at 13:30

## 2017-02-25 RX ADMIN — TIMOLOL MALEATE SCH DROP: 2.5 SOLUTION OPHTHALMIC at 20:22

## 2017-02-25 RX ADMIN — ALPRAZOLAM PRN MG: 0.5 TABLET ORAL at 08:14

## 2017-02-25 RX ADMIN — Medication SCH MG: at 20:21

## 2017-02-25 RX ADMIN — IPRATROPIUM BROMIDE AND ALBUTEROL SULFATE SCH VIAL: .5; 3 SOLUTION RESPIRATORY (INHALATION) at 23:05

## 2017-02-25 RX ADMIN — IPRATROPIUM BROMIDE AND ALBUTEROL SULFATE SCH VIAL: .5; 3 SOLUTION RESPIRATORY (INHALATION) at 04:45

## 2017-02-25 RX ADMIN — Medication SCH SYR: at 12:30

## 2017-02-25 RX ADMIN — IPRATROPIUM BROMIDE AND ALBUTEROL SCH SPRAY: 20; 100 SPRAY, METERED RESPIRATORY (INHALATION) at 09:43

## 2017-02-25 RX ADMIN — CLOPIDOGREL SCH MG: 75 TABLET, FILM COATED ORAL at 09:41

## 2017-02-25 RX ADMIN — CEFTRIAXONE SODIUM SCH MLS/HR: 1 INJECTION, POWDER, FOR SOLUTION INTRAMUSCULAR; INTRAVENOUS at 09:43

## 2017-02-25 RX ADMIN — HYDROCODONE POLISTIREX AND CHLORPHENIRAMINE POLISTIREX SCH ML: 10; 8 SUSPENSION, EXTENDED RELEASE ORAL at 20:20

## 2017-02-25 RX ADMIN — ASPIRIN SCH MG: 81 TABLET, COATED ORAL at 10:11

## 2017-02-25 RX ADMIN — BENZONATATE SCH MG: 100 CAPSULE ORAL at 09:41

## 2017-02-25 RX ADMIN — Medication SCH SYR: at 20:26

## 2017-02-25 RX ADMIN — IPRATROPIUM BROMIDE AND ALBUTEROL SCH SPRAY: 20; 100 SPRAY, METERED RESPIRATORY (INHALATION) at 20:20

## 2017-02-26 RX ADMIN — ENOXAPARIN SODIUM SCH MG: 30 INJECTION SUBCUTANEOUS at 09:10

## 2017-02-26 RX ADMIN — HYDROCODONE POLISTIREX AND CHLORPHENIRAMINE POLISTIREX SCH ML: 10; 8 SUSPENSION, EXTENDED RELEASE ORAL at 09:10

## 2017-02-26 RX ADMIN — Medication SCH SYR: at 05:58

## 2017-02-26 RX ADMIN — IPRATROPIUM BROMIDE AND ALBUTEROL SULFATE SCH VIAL: .5; 3 SOLUTION RESPIRATORY (INHALATION) at 17:07

## 2017-02-26 RX ADMIN — IPRATROPIUM BROMIDE AND ALBUTEROL SCH SPRAY: 20; 100 SPRAY, METERED RESPIRATORY (INHALATION) at 20:31

## 2017-02-26 RX ADMIN — LISINOPRIL SCH MG: 10 TABLET ORAL at 20:32

## 2017-02-26 RX ADMIN — METHYLPREDNISOLONE SODIUM SUCCINATE SCH MG: 125 INJECTION, POWDER, FOR SOLUTION INTRAMUSCULAR; INTRAVENOUS at 13:50

## 2017-02-26 RX ADMIN — IPRATROPIUM BROMIDE AND ALBUTEROL SCH SPRAY: 20; 100 SPRAY, METERED RESPIRATORY (INHALATION) at 09:10

## 2017-02-26 RX ADMIN — ASPIRIN SCH MG: 81 TABLET, COATED ORAL at 09:11

## 2017-02-26 RX ADMIN — IPRATROPIUM BROMIDE AND ALBUTEROL SULFATE SCH VIAL: .5; 3 SOLUTION RESPIRATORY (INHALATION) at 22:43

## 2017-02-26 RX ADMIN — Medication SCH MG: at 20:32

## 2017-02-26 RX ADMIN — METHYLPREDNISOLONE SODIUM SUCCINATE SCH MG: 125 INJECTION, POWDER, FOR SOLUTION INTRAMUSCULAR; INTRAVENOUS at 20:36

## 2017-02-26 RX ADMIN — HYDROCODONE BITARTRATE AND ACETAMINOPHEN PRN TAB: 7.5; 325 TABLET ORAL at 17:28

## 2017-02-26 RX ADMIN — CEFTRIAXONE SODIUM SCH MLS/HR: 1 INJECTION, POWDER, FOR SOLUTION INTRAMUSCULAR; INTRAVENOUS at 09:10

## 2017-02-26 RX ADMIN — BENZONATATE SCH MG: 100 CAPSULE ORAL at 09:11

## 2017-02-26 RX ADMIN — PANTOPRAZOLE SODIUM SCH MG: 40 TABLET, DELAYED RELEASE ORAL at 06:02

## 2017-02-26 RX ADMIN — LISINOPRIL SCH MG: 10 TABLET ORAL at 09:11

## 2017-02-26 RX ADMIN — BENZONATATE SCH MG: 100 CAPSULE ORAL at 20:32

## 2017-02-26 RX ADMIN — BENZONATATE SCH MG: 100 CAPSULE ORAL at 14:48

## 2017-02-26 RX ADMIN — CITALOPRAM HYDROBROMIDE SCH MG: 20 TABLET, FILM COATED ORAL at 09:11

## 2017-02-26 RX ADMIN — Medication SCH SYR: at 20:36

## 2017-02-26 RX ADMIN — FERROUS SULFATE TAB EC 324 MG (65 MG FE EQUIVALENT) SCH MG: 324 (65 FE) TABLET DELAYED RESPONSE at 09:11

## 2017-02-26 RX ADMIN — SIMVASTATIN SCH MG: 40 TABLET, FILM COATED ORAL at 20:32

## 2017-02-26 RX ADMIN — IPRATROPIUM BROMIDE AND ALBUTEROL SULFATE SCH VIAL: .5; 3 SOLUTION RESPIRATORY (INHALATION) at 11:20

## 2017-02-26 RX ADMIN — TIMOLOL MALEATE SCH DROP: 2.5 SOLUTION OPHTHALMIC at 20:31

## 2017-02-26 RX ADMIN — HYDROCODONE POLISTIREX AND CHLORPHENIRAMINE POLISTIREX SCH ML: 10; 8 SUSPENSION, EXTENDED RELEASE ORAL at 20:32

## 2017-02-26 RX ADMIN — Medication SCH EACH: at 09:11

## 2017-02-26 RX ADMIN — METHYLPREDNISOLONE SODIUM SUCCINATE SCH MG: 125 INJECTION, POWDER, FOR SOLUTION INTRAMUSCULAR; INTRAVENOUS at 05:59

## 2017-02-26 RX ADMIN — TIMOLOL MALEATE SCH DROP: 2.5 SOLUTION OPHTHALMIC at 09:10

## 2017-02-26 RX ADMIN — Medication SCH MG: at 09:10

## 2017-02-26 RX ADMIN — ALPRAZOLAM PRN MG: 0.5 TABLET ORAL at 17:29

## 2017-02-26 RX ADMIN — PANTOPRAZOLE SODIUM SCH MG: 40 TABLET, DELAYED RELEASE ORAL at 17:08

## 2017-02-26 RX ADMIN — Medication SCH SYR: at 12:28

## 2017-02-26 RX ADMIN — Medication SCH MG: at 14:59

## 2017-02-26 RX ADMIN — IPRATROPIUM BROMIDE AND ALBUTEROL SULFATE SCH VIAL: .5; 3 SOLUTION RESPIRATORY (INHALATION) at 04:53

## 2017-02-26 RX ADMIN — CLOPIDOGREL SCH MG: 75 TABLET, FILM COATED ORAL at 09:11

## 2017-02-26 RX ADMIN — FENOFIBRATE SCH MG: 160 TABLET, FILM COATED ORAL at 09:11

## 2017-02-26 RX ADMIN — TRAVOPROST SCH DROP: 0.04 SOLUTION/ DROPS OPHTHALMIC at 20:31

## 2017-02-26 RX ADMIN — SODIUM CHLORIDE SCH MLS/HR: 0.9 INJECTION, SOLUTION INTRAVENOUS at 10:23

## 2017-02-27 LAB
ALBUMIN SERPL-MCNC: 2.4 G/DL (ref 3.4–5)
ALBUMIN/GLOB SERPL: 0.77 {RATIO}
ALP SERPL-CCNC: 49 U/L (ref 53–141)
ALT SERPL-CCNC: 10 U/L (ref 12–78)
AMYLASE SERPL-CCNC: 43 U/L (ref 25–115)
ANION GAP SERPL CALC-SCNC: 12.6 MMOL/L
ARTERIAL PATENCY WRIST A: (no result)
AST SERPL-CCNC: 18 U/L (ref 15–37)
BASE EXCESS STD BLDA CALC-SCNC: 9 MMOL/L (ref -2–2)
BASOPHILS # BLD AUTO: 0 K/UL (ref 0–0.2)
BASOPHILS NFR BLD AUTO: 0.1 % (ref 0–3)
BILIRUB SERPL-MCNC: 0.12 MG/DL (ref 0–1.2)
BUN SERPL-MCNC: 21 MG/DL (ref 7–18)
BUN/CREAT SERPL: 25.92
CALCIUM SERPL-MCNC: 9.3 MG/DL (ref 8.2–10.2)
CHLORIDE SERPL-SCNC: 91 MMOL/L (ref 98–107)
CK SERPL-CCNC: 18 U/L
CO2 BLD-SCNC: 35 MMOL/L (ref 23–31)
CO2 BLDA CALC-SCNC: 35 MMOL/L (ref 22–28)
CREAT SERPL-MCNC: 0.81 MG/DL (ref 0.6–1.3)
EOSINOPHIL # BLD AUTO: 0 K/UL (ref 0–0.7)
EOSINOPHIL NFR BLD AUTO: 0 % (ref 0–7)
FERRITIN SERPL IA-MCNC: 75.44 NG/ML (ref 4.63–204)
FOLATE SERPL-MCNC: 9.5 NG/ML (ref 3.1–20.5)
GFR SERPLBLD BASED ON 1.73 SQ M-ARVRAT: 69 ML/MIN
GLOBULIN SER CALC-MCNC: 3.1 G/L
GLUCOSE SERPL-MCNC: 158 MG/DL (ref 82–115)
HCO3 BLDA-SCNC: 33.8 MMOL/L (ref 22–26)
HCT VFR BLD AUTO: 25.5 % (ref 37–47)
HEMOCCULT SP1 SPEC QL: POSITIVE
HGB BLD-MCNC: 8.1 G/DL (ref 12–16)
IMM GRANULOCYTES NFR BLD AUTO: 4.3 % (ref 0–5)
IMM RETICS/RBC NFR BLDCO AUTO: 35.1 %
INHALED O2 CONCENTRATION: 21 %
IRON SERPL-MCNC: 22 UG/DL (ref 50–170)
LIPASE SERPL-CCNC: 25 U/L (ref 8–78)
LYMPHOCYTES # SPEC AUTO: 0.5 K/UL (ref 0.6–3.4)
LYMPHOCYTES NFR BLD AUTO: 6.1 % (ref 10–50)
MCH RBC QN: 30.6 PG (ref 27–31)
MCHC RBC AUTO-ENTMCNC: 31.8 G/DL (ref 31.8–35.4)
MCV RBC: 96.2 FL (ref 81–99)
MONOCYTES # BLD AUTO: 0.2 K/UL (ref 0.4–2)
MONOCYTES NFR BLD AUTO: 2.6 % (ref 0–10)
NEUTROPHILS # BLD AUTO: 7.3 K/UL (ref 2–6.9)
NEUTROPHILS NFR BLD AUTO: 86.9 %
OCCULT BLOOD INTERNAL QC 1: (no result)
PCO2 BLDA: 51 MMHG (ref 35–45)
PH BLDA: 7.43 [PH] (ref 7.35–7.45)
PLATELET # BLD AUTO: 278 10^3/UL (ref 140–440)
PO2 BLDA: 58 MMHG (ref 85–100)
POTASSIUM SERPL-SCNC: 4.6 MMOL/L (ref 3.5–5.1)
PROT SERPL-MCNC: 5.5 G/DL (ref 5.8–8.1)
RBC # BLD AUTO: 2.65 10^6/UL (ref 4.2–5.4)
RETICS/RBC NFR: 3.35 %
SAO2 % BLDA FROM PO2: 90 % (ref 95–100)
SODIUM SERPL-SCNC: 134 MMOL/L (ref 136–145)
TIBC SERPL-MCNC: 266 UG/DL (ref 240–450)
TROPONIN I SERPL-MCNC: 0.01 NG/ML (ref 0–0.4)
WBC # BLD AUTO: 8.38 K/UL (ref 4.6–10.2)

## 2017-02-27 RX ADMIN — IPRATROPIUM BROMIDE AND ALBUTEROL SULFATE SCH: .5; 3 SOLUTION RESPIRATORY (INHALATION) at 17:00

## 2017-02-27 RX ADMIN — LISINOPRIL SCH MG: 10 TABLET ORAL at 21:37

## 2017-02-27 RX ADMIN — HYDROCODONE POLISTIREX AND CHLORPHENIRAMINE POLISTIREX SCH ML: 10; 8 SUSPENSION, EXTENDED RELEASE ORAL at 21:36

## 2017-02-27 RX ADMIN — METHYLPREDNISOLONE SODIUM SUCCINATE SCH: 125 INJECTION, POWDER, FOR SOLUTION INTRAMUSCULAR; INTRAVENOUS at 14:13

## 2017-02-27 RX ADMIN — IPRATROPIUM BROMIDE AND ALBUTEROL SCH SPRAY: 20; 100 SPRAY, METERED RESPIRATORY (INHALATION) at 09:20

## 2017-02-27 RX ADMIN — BENZONATATE SCH MG: 100 CAPSULE ORAL at 21:39

## 2017-02-27 RX ADMIN — TIMOLOL MALEATE SCH DROP: 2.5 SOLUTION OPHTHALMIC at 09:24

## 2017-02-27 RX ADMIN — Medication SCH SYR: at 05:40

## 2017-02-27 RX ADMIN — CEFTRIAXONE SODIUM SCH MLS/HR: 1 INJECTION, POWDER, FOR SOLUTION INTRAMUSCULAR; INTRAVENOUS at 09:15

## 2017-02-27 RX ADMIN — Medication SCH MG: at 21:37

## 2017-02-27 RX ADMIN — HYDROCODONE POLISTIREX AND CHLORPHENIRAMINE POLISTIREX SCH ML: 10; 8 SUSPENSION, EXTENDED RELEASE ORAL at 09:24

## 2017-02-27 RX ADMIN — METHYLPREDNISOLONE SODIUM SUCCINATE SCH MG: 125 INJECTION, POWDER, FOR SOLUTION INTRAMUSCULAR; INTRAVENOUS at 05:40

## 2017-02-27 RX ADMIN — TRAVOPROST SCH DROP: 0.04 SOLUTION/ DROPS OPHTHALMIC at 21:33

## 2017-02-27 RX ADMIN — IPRATROPIUM BROMIDE AND ALBUTEROL SULFATE SCH VIAL: .5; 3 SOLUTION RESPIRATORY (INHALATION) at 11:17

## 2017-02-27 RX ADMIN — METHYLPREDNISOLONE SCH MG: 4 TABLET ORAL at 17:12

## 2017-02-27 RX ADMIN — IPRATROPIUM BROMIDE AND ALBUTEROL SULFATE SCH VIAL: .5; 3 SOLUTION RESPIRATORY (INHALATION) at 05:03

## 2017-02-27 RX ADMIN — METHYLPREDNISOLONE SCH MG: 4 TABLET ORAL at 21:31

## 2017-02-27 RX ADMIN — FERROUS SULFATE TAB EC 324 MG (65 MG FE EQUIVALENT) SCH MG: 324 (65 FE) TABLET DELAYED RESPONSE at 09:21

## 2017-02-27 RX ADMIN — Medication SCH SYR: at 09:17

## 2017-02-27 RX ADMIN — SIMVASTATIN SCH MG: 40 TABLET, FILM COATED ORAL at 22:27

## 2017-02-27 RX ADMIN — ENOXAPARIN SODIUM SCH MG: 30 INJECTION SUBCUTANEOUS at 09:21

## 2017-02-27 RX ADMIN — HYDROCODONE BITARTRATE AND ACETAMINOPHEN PRN TAB: 7.5; 325 TABLET ORAL at 21:00

## 2017-02-27 RX ADMIN — HYDROCODONE BITARTRATE AND ACETAMINOPHEN PRN TAB: 7.5; 325 TABLET ORAL at 09:37

## 2017-02-27 RX ADMIN — PANTOPRAZOLE SODIUM SCH MG: 40 TABLET, DELAYED RELEASE ORAL at 05:40

## 2017-02-27 RX ADMIN — PANTOPRAZOLE SODIUM SCH MG: 40 TABLET, DELAYED RELEASE ORAL at 17:13

## 2017-02-27 RX ADMIN — ASPIRIN SCH MG: 81 TABLET, COATED ORAL at 09:19

## 2017-02-27 RX ADMIN — LISINOPRIL SCH MG: 10 TABLET ORAL at 09:25

## 2017-02-27 RX ADMIN — IPRATROPIUM BROMIDE AND ALBUTEROL SCH SPRAY: 20; 100 SPRAY, METERED RESPIRATORY (INHALATION) at 21:31

## 2017-02-27 RX ADMIN — TIMOLOL MALEATE SCH DROP: 2.5 SOLUTION OPHTHALMIC at 21:33

## 2017-02-27 RX ADMIN — Medication SCH MG: at 09:22

## 2017-02-27 RX ADMIN — Medication SCH MG: at 14:57

## 2017-02-27 RX ADMIN — IPRATROPIUM BROMIDE AND ALBUTEROL SULFATE SCH VIAL: .5; 3 SOLUTION RESPIRATORY (INHALATION) at 23:29

## 2017-02-27 RX ADMIN — Medication SCH EACH: at 09:19

## 2017-02-27 RX ADMIN — BENZONATATE SCH MG: 100 CAPSULE ORAL at 14:57

## 2017-02-27 RX ADMIN — Medication SCH SYR: at 12:24

## 2017-02-27 RX ADMIN — FENOFIBRATE SCH MG: 160 TABLET, FILM COATED ORAL at 09:24

## 2017-02-27 RX ADMIN — CLOPIDOGREL SCH MG: 75 TABLET, FILM COATED ORAL at 09:23

## 2017-02-27 RX ADMIN — CITALOPRAM HYDROBROMIDE SCH MG: 20 TABLET, FILM COATED ORAL at 09:20

## 2017-02-27 RX ADMIN — Medication SCH SYR: at 21:41

## 2017-02-27 RX ADMIN — BENZONATATE SCH MG: 100 CAPSULE ORAL at 09:23

## 2017-02-28 VITALS — DIASTOLIC BLOOD PRESSURE: 70 MMHG | SYSTOLIC BLOOD PRESSURE: 146 MMHG | TEMPERATURE: 98 F

## 2017-02-28 LAB
ALBUMIN SERPL-MCNC: 2.5 G/DL (ref 3.4–5)
ALBUMIN/GLOB SERPL: 0.89 {RATIO}
ALP SERPL-CCNC: 45 U/L (ref 53–141)
ALT SERPL-CCNC: 10 U/L (ref 12–78)
ANION GAP SERPL CALC-SCNC: 10.6 MMOL/L
AST SERPL-CCNC: 15 U/L (ref 15–37)
BASOPHILS # BLD AUTO: 0 K/UL (ref 0–0.2)
BASOPHILS NFR BLD AUTO: 0.1 % (ref 0–3)
BILIRUB SERPL-MCNC: 0.17 MG/DL (ref 0–1.2)
BUN SERPL-MCNC: 23 MG/DL (ref 7–18)
BUN/CREAT SERPL: 24.46
CALCIUM SERPL-MCNC: 9.2 MG/DL (ref 8.2–10.2)
CHLORIDE SERPL-SCNC: 87 MMOL/L (ref 98–107)
CO2 BLD-SCNC: 37 MMOL/L (ref 23–31)
CREAT SERPL-MCNC: 0.94 MG/DL (ref 0.6–1.3)
EOSINOPHIL # BLD AUTO: 0 K/UL (ref 0–0.7)
EOSINOPHIL NFR BLD AUTO: 0 % (ref 0–7)
GFR SERPLBLD BASED ON 1.73 SQ M-ARVRAT: 58 ML/MIN
GLOBULIN SER CALC-MCNC: 2.8 G/L
GLUCOSE SERPL-MCNC: 113 MG/DL (ref 82–115)
HCT VFR BLD AUTO: 26.3 % (ref 37–47)
HEMOCCULT SP2 STL QL IA: (no result)
HEMOCCULT SP3 STL QL IA: (no result)
HGB BLD-MCNC: 8.4 G/DL (ref 12–16)
IMM GRANULOCYTES NFR BLD AUTO: 4.3 % (ref 0–5)
LYMPHOCYTES # SPEC AUTO: 0.8 K/UL (ref 0.6–3.4)
LYMPHOCYTES NFR BLD AUTO: 9.5 % (ref 10–50)
MCH RBC QN: 30.7 PG (ref 27–31)
MCHC RBC AUTO-ENTMCNC: 31.9 G/DL (ref 31.8–35.4)
MCV RBC: 96 FL (ref 81–99)
MONOCYTES # BLD AUTO: 0.7 K/UL (ref 0.4–2)
MONOCYTES NFR BLD AUTO: 7.8 % (ref 0–10)
NEUTROPHILS # BLD AUTO: 6.6 K/UL (ref 2–6.9)
NEUTROPHILS NFR BLD AUTO: 78.3 %
OCCULT BLOOD INTERNAL QC 2: (no result)
OCCULT BLOOD INTERNAL QC 3: (no result)
PLATELET # BLD AUTO: 299 10^3/UL (ref 140–440)
POTASSIUM SERPL-SCNC: 4.6 MMOL/L (ref 3.5–5.1)
PROT SERPL-MCNC: 5.3 G/DL (ref 5.8–8.1)
RBC # BLD AUTO: 2.74 10^6/UL (ref 4.2–5.4)
SODIUM SERPL-SCNC: 130 MMOL/L (ref 136–145)
WBC # BLD AUTO: 8.42 K/UL (ref 4.6–10.2)

## 2017-02-28 RX ADMIN — Medication SCH SYR: at 05:54

## 2017-02-28 RX ADMIN — CITALOPRAM HYDROBROMIDE SCH MG: 20 TABLET, FILM COATED ORAL at 08:54

## 2017-02-28 RX ADMIN — Medication SCH MG: at 08:53

## 2017-02-28 RX ADMIN — IPRATROPIUM BROMIDE AND ALBUTEROL SCH SPRAY: 20; 100 SPRAY, METERED RESPIRATORY (INHALATION) at 08:56

## 2017-02-28 RX ADMIN — CEFTRIAXONE SODIUM SCH MLS/HR: 1 INJECTION, POWDER, FOR SOLUTION INTRAMUSCULAR; INTRAVENOUS at 08:52

## 2017-02-28 RX ADMIN — PANTOPRAZOLE SODIUM SCH MG: 40 TABLET, DELAYED RELEASE ORAL at 06:00

## 2017-02-28 RX ADMIN — IPRATROPIUM BROMIDE AND ALBUTEROL SULFATE SCH VIAL: .5; 3 SOLUTION RESPIRATORY (INHALATION) at 11:10

## 2017-02-28 RX ADMIN — ALPRAZOLAM PRN MG: 0.5 TABLET ORAL at 03:15

## 2017-02-28 RX ADMIN — HYDROCODONE POLISTIREX AND CHLORPHENIRAMINE POLISTIREX SCH ML: 10; 8 SUSPENSION, EXTENDED RELEASE ORAL at 08:58

## 2017-02-28 RX ADMIN — Medication SCH EACH: at 08:54

## 2017-02-28 RX ADMIN — ASPIRIN SCH MG: 81 TABLET, COATED ORAL at 08:53

## 2017-02-28 RX ADMIN — METHYLPREDNISOLONE SCH MG: 4 TABLET ORAL at 05:54

## 2017-02-28 RX ADMIN — FENOFIBRATE SCH MG: 160 TABLET, FILM COATED ORAL at 08:58

## 2017-02-28 RX ADMIN — LISINOPRIL SCH MG: 10 TABLET ORAL at 08:54

## 2017-02-28 RX ADMIN — TIMOLOL MALEATE SCH DROP: 2.5 SOLUTION OPHTHALMIC at 08:58

## 2017-02-28 RX ADMIN — ENOXAPARIN SODIUM SCH MG: 30 INJECTION SUBCUTANEOUS at 08:55

## 2017-02-28 RX ADMIN — FERROUS SULFATE TAB EC 324 MG (65 MG FE EQUIVALENT) SCH MG: 324 (65 FE) TABLET DELAYED RESPONSE at 08:53

## 2017-02-28 RX ADMIN — CLOPIDOGREL SCH MG: 75 TABLET, FILM COATED ORAL at 08:54

## 2017-02-28 RX ADMIN — IPRATROPIUM BROMIDE AND ALBUTEROL SULFATE SCH VIAL: .5; 3 SOLUTION RESPIRATORY (INHALATION) at 05:44

## 2017-02-28 RX ADMIN — BENZONATATE SCH MG: 100 CAPSULE ORAL at 08:54

## 2017-02-28 NOTE — PN
DATE OF SERVICE:  02/26/17



SUBJECTIVE:  

The patient is a 71 year old white female hospitalized with upper respiratory 
tract infection and shortness of breath on minimal exertion. The patient has 
acute pneumonitis. 



REVIEW OF SYSTEMS: 

CONSTITUTIONAL: No night sweats. Still fatigue. No fever or chills.

HEENT: Eyes: No visual changes. No eye pain. No eye discharge. ENT: No runny 
nose. No epistaxis. No sinus pain. No sore throat. No odynophagia. No 
congestion.

RESPIRATORY: Mild cough with congestion. No hemoptysis.

CARDIOVASCULAR: No angina symptoms. No CHF symptoms. No atypical chest pain for 
CAD. No palpitations. Exertional shortness of breath more than usual lately. No 
PND. No orthopnea. 

GASTROINTESTINAL: No abdominal pain. No nausea or vomiting. No diarrhea or 
constipation. No hematemesis. No hematochezia. Appetite is improving. 

GENITOURINARY: No urgency. No frequency. No dysuria. No hematuria. No 
obstructive symptoms. No discharge. No pain. No significant abnormal bleeding.

MUSCULOSKELETAL: No musculoskeletal pain; no joint swelling. 

NEUROLOGICAL:  No headache. No neck pain. No syncope. No seizures. No dizziness.

PSYCHIATRIC: Not anxious. No depression. No suicidal thoughts. No homicidal 
thoughts.

SKIN:  No rash. No lesions. No wounds.

ENDOCRINE: No unexplained weight loss. No weight gain. 

HEMATOLOGIC/LYMPHATIC: No anemia. No purpura. No petechiae. No prolonged or 
excessive bleeding. No palpable lymph nodes.



PHYSICAL EXAMINATION: 

GENERAL:  The patient is oriented to time, place and person. 

VITAL SIGNS:  Temperature 97.8, pulse 100, respiratory rate 21, blood pressure 
123/65 and pulse ox 91% with oxygen. 

HEENT:  Head normocephalic, atraumatic.  Eyes: Extraocular muscles are intact.  
Pupils are equal, round and reactive to light and accommodation.  Ears:  No 
lesions.  Nose appeared normal. Throat: No exudate or erythema.

NECK: Supple. No JVD, no carotid bruit.  No lymphadenopathy or thyromegaly. 

LUNGS: Decreased breath sounds but clear to auscultation.  Percussion note 
normal.  Chest symmetrical. 

HEART:  S1, S2, no S3. No murmurs.  No cyanosis or clubbing.  No ascites.  
Pulses: Dorsalis pedis and posterior tibial pulses +1 to +2 both sides. 

ABDOMEN:  Soft.  Nontender.  Bowel sounds active. No CVA tenderness. No mass 
felt. 

EXTREMITIES:  No edema.  Full range of motion of all extremities, equal. 

NEUROLOGIC:  No focal deficit. Cranial nerves II through XII are grossly 
intact.  No headache, no double vision or headache. 

SKIN: Not dry.  Intact.  Turgor - normal. 

LYMPHATIC:  No palpable lymph nodes/no lymphedema. 

MUSCULOSKELETAL:  Normal joints with no swelling. Muscle tone is normal. 



LABS:

Hgb 9.6, hct 30, WBC 12,000 normal differential, creatinine 0.8, BUN 14, 
potassium 4.1 and . 



ASSESSMENT:

1. Acute respiratory failure 

2. Acute bronchitis 

3. Severe chronic lung disease

4. Cavitary lesion on the right lung, being followed by pulmonary MD. 

5. Depression 

6. Hypertension 

7. Dyslipidemia 

8. Generalized osteoarthritis 

 

PLAN:

1. Continue NEBS treatment

2. Continue Steroids

3. Continue IV antibiotics

4. IV Lasix was given yesterday because of the swelling of the face and the 
generalized swelling likely from fluid retention from steroid therapy. 

5. Continue antibiotics

6. Will do echocardiogram 

7. Daily CBC and CMP 

8. Will repeat BNP. 



CONDITION: Stable. 



TIME SPENT:  More than 30 minutes. 



Plan and coordination of the patient's care discussed in the presence of nurse. 
HOMERO

## 2017-02-28 NOTE — CM.DICTOOL
ADMISSION: 


02/24/17 11:34





DISCHARGE: 


FEB. 28, 2017











DATE OF SERVICE: 02/28/17


FINAL DIAGNOSIS





Shortness of breath 


URI (upper respiratory infection)


Anemia


Hypoexemia


COPD


Continued Smoking


Hypertension


Dyslipidemia


Depression


DJD spine


Infrarenal Fusiform Abdominal Aortic Aneurysm, 3.3 cm per CT (noted in 2013, 

2016 and 2017)





Hysterectomy


Left Breast Lumpectomy





LAST VITALS











Temp Pulse Resp BP Pulse Ox


 


 98.0 F   62   20   146/70 H  92 L


 


 02/28/17 10:00  02/28/17 10:00  02/28/17 10:00  02/28/17 10:00  02/28/17 10:00








ACTIVE HOME MEDICATIONS





Acetaminophen/Hydrocodone Bitart (Norco 7.5-325)  1 tab PO BID PRN


   PRN Reason: pain


   Last Admin: 02/27/17 21:00 Dose:  1 tab


Albuterol/Ipratropium (Combivent Respimat Inhal Spray)  1 spray IH BID [facility]


   Last Admin: 02/28/17 08:56 Dose:  1 spray


Alprazolam (Xanax)  0.5 mg PO TID PRN


   PRN Reason: ANXIETY


   Last Admin: 02/28/17 03:15 Dose:  0.5 mg


Aspirin (Aspirin Ec)  81 mg PO DAILYWM [facility]


   Last Admin: 02/28/17 08:53 Dose:  81 mg


Calcium/Vitamin D (Calcium 500 + Vit D 200 Mg Tablet)  1 each PO DAILY [facility]


   Last Admin: 02/28/17 08:54 Dose:  1 each


Citalopram Hydrobromide (Celexa)  20 mg PO DAILY [facility]


   Last Admin: 02/28/17 08:54 Dose:  20 mg


Clopidogrel Bisulfate (Plavix)  75 mg PO DAILY [facility]


   Last Admin: 02/28/17 08:54 Dose:  75 mg


Fenofibrate (Triglide)  160 mg PO DAILY [facility]


   Last Admin: 02/28/17 08:58 Dose:  160 mg


Ferrous Sulfate (Ferrous Sulfate)  324 mg PO DAILY [facility]


   Last Admin: 02/28/17 08:53 Dose:  324 mg


Fish Oil (Omega-3 Fish Oil)  1,000 mg PO TID [facility]


   Last Admin: 02/28/17 08:53 Dose:  1,000 mg


Lisinopril (Zestril)  20 mg PO BID [facility]


   Last Admin: 02/28/17 08:54 Dose:  20 mg


Methylprednisolone (Medrol Dosepak)  4 mg PO 1300 VEENA


   PRN Reason: Taper


   Stop: 03/04/17 09:29


   Last Admin: 02/28/17 05:54 Dose:  4 mg


Nitroglycerin (Nitrostat)  0.4 mg SL Q5MIN X 3 DOSES PRN


   PRN Reason: Chest Pain


   Last Admin: 02/27/17 10:05 Dose:  0.4 mg


Pantoprazole Sodium (Protonix)  40 mg PO BIDAC [facility]


   Last Admin: 02/28/17 06:00 Dose:  40 mg


Simvastatin (Zocor)  40 mg PO BEDTIME [facility]


   Last Admin: 02/27/17 22:27 Dose:  40 mg


Timolol Maleate (Timoptic 0.25% Opth)  1 drop OP BID [facility]


   Last Admin: 02/28/17 08:58 Dose:  1 drop


Travoprost (Travatan Z)  1 drop OP BEDTIME [facility]


   Last Admin: 02/27/17 21:33 Dose:  1 drop





ALLERGIES





No Known Allergies Allergy (Unverified 02/25/17 02:42)





 








NEW PRESCRIPTIONS: 


Keflex 500 mg BID for 5 days


Please complete the hospital issued Medrol Dose Pack according to direction.








SMOKING: 


Advised to stop smoking








DISEASE SPECIFIC EDUCATION: 


Smoking


COPD


Anemia


Prescriptions


Appointments








LAB REVIEW:





 02/28/17 05:00 





 02/28/17 05:00 











02/28/17 05:00: WBC 8.42, RBC 2.74 L, Hgb 8.4 L, Hct 26.3 L, MCV 96.0, MCH 30.7

, MCHC 31.9, RDW Coeff of Federico 15.7 H, Plt Count 299, Immature Gran % (Auto) 4.3

, Neut % (Auto) 78.3, Lymph % (Auto) 9.5 L, Mono % (Auto) 7.8, Eos % (Auto) 0.0

, Baso % (Auto) 0.1, Immature Gran # (Auto) 0.4, Neut # 6.6, Lymph # 0.8, Mono 

# 0.7, Eos # 0.0, Baso # 0.0, Sodium 130 L, Potassium 4.6, Chloride 87 L, 

Carbon Dioxide 37 H, Anion Gap 10.6, BUN 23 H, Creatinine 0.94, Estimated GFR (

MDRD) 58.00, BUN/Creatinine Ratio 24.46, Glucose 113, Calcium 9.2, Total 

Bilirubin 0.17, AST 15, ALT 10 L, Alkaline Phosphatase 45 L, Total Protein 5.3 L

, Albumin 2.5 L, Globulin 2.8, Albumin/Globulin Ratio 0.89


02/27/17 10:45: Amylase 43, Lipase 25


02/27/17 09:10: Stool Occult Blood #2 No specimen received, Stool Occult Blood #

3 No specimen received





PLAN: 


Discharge home





Diet: Regular


Activity: Gradually resume as tolerated





Medication changes:


Increase Ferrous sulfate (iron) to twice daily





Appointments:





Dr. Bailey on March 6th at 9 am.  Blood work will be done to check the CBC


Dr. Rojo ([facility]) on March 7th at 1:15 pm





Ms. Greenberg is alert and oriented x 3.  She is independent with ADL'S and is 

ambulatory without use of assistive device. Ms. Greenberg is the primary caregiver 

for her brother who lives in the home with her. Meal intakes are good at 20-75%

. She denies nausea or abdominal pain.  A stool for occult blood was positive 

and an appointment has been made with Dr. Rjoo for evaluation for 

colonoscopy and endoscopy.  She is advised to stop smoking. Skin condition is 

good, no open sores, rashes or other irritation. 








____________________


Almas Bailey MD

## 2017-02-28 NOTE — PCM.PROG
Attending Provider: 


ATTENDING PROVIDER:


Dr. MOY URBAN





DATE OF SERVICE:  02/28/17





SUBJECTIVE: 


This 71 year old WHITE/ F was hospitalized 02/24/17. The patient is 

admitted with COPD exacerbation and bronchitis. Hemoglobin dropped from 9.2 to 

8.4.  Stool for occult blood is positive. CT scan did not show any colonic 

mass. The patient has never had a colonoscopy, The patient was explained the 

importance of having the colonoscopy and is agreeable. Breathing is better; she 

is still coughing.  





REVIEW OF SYSTEMS:


CONSTITUTIONAL: No fever, no chills.


ENDOCRINE: No weight loss or weight gain.


HEENT: No sinus drainage, no sore throat.


CVS: No angina symptoms. No CHF symptoms.  No palpitations.  No atypical chest 

pain for CAD.  No shortness of breath.


RESPIRATORY: Cough.  No hemoptysis.


GI: No melena.  No abdominal pain.  No nausea, no vomiting.


: No hematuria.  No polyuria.


SKIN: No rash.  No wounds.


MUSCULOSKELETAL: No pain.


CNS: No blackout, no dizziness.  No headache.  No double vision.


PSYCHIATRIC: Not anxious; no depression.  No suicidal thoughts.  No homicidal 

thoughts.





PHYSICAL EXAMINATION:


GENERAL:  Lying in bed in no distress.


VITAL SIGNS:  Temperature 97.1 F, Pulse 67, Respiratory Rate 20, /70, 

Pulse Ox 98%


HEENT:  Normocephalic, atraumatic.  Mucosa is dry, pallor positive.


NECK:  No JVP, no carotid bruit.  No lymphadenopathy.


CARDIAC:  S1, S2, no S3.  No murmur, gallop or regurgitation.


LUNGS:  Decreased with some crackles, expiratory wheezing but is better. 


ABDOMEN:  Soft, non-tender.  Bowel sounds active.  No rigidity, guarding or CVA 

tenderness.


EXTREMITIES:  No clubbing, cyanosis or edema.  


NEUROLOGIC: Awake, alert and oriented x3.


LYMPHATIC: No palpable lymph nodes


SKIN: Not dry.  Intact.


MUSCULOSKELETAL: No joint swelling.








LAB REVIEW:





 02/28/17 05:00 





 02/28/17 05:00 











02/28/17 05:00: WBC 8.42, RBC 2.74 L, Hgb 8.4 L, Hct 26.3 L, MCV 96.0, MCH 30.7

, MCHC 31.9, RDW Coeff of Federico 15.7 H, Plt Count 299, Immature Gran % (Auto) 4.3

, Neut % (Auto) 78.3, Lymph % (Auto) 9.5 L, Mono % (Auto) 7.8, Eos % (Auto) 0.0

, Baso % (Auto) 0.1, Immature Gran # (Auto) 0.4, Neut # 6.6, Lymph # 0.8, Mono 

# 0.7, Eos # 0.0, Baso # 0.0, Sodium 130 L, Potassium 4.6, Chloride 87 L, 

Carbon Dioxide 37 H, Anion Gap 10.6, BUN 23 H, Creatinine 0.94, Estimated GFR (

MDRD) 58.00, BUN/Creatinine Ratio 24.46, Glucose 113, Calcium 9.2, Total 

Bilirubin 0.17, AST 15, ALT 10 L, Alkaline Phosphatase 45 L, Total Protein 5.3 L

, Albumin 2.5 L, Globulin 2.8, Albumin/Globulin Ratio 0.89


02/27/17 10:45: Total Creatine Kinase 18, Troponin I 0.0150, Amylase 43, Lipase 

25


02/27/17 09:10: Stl Occult Blood (IFOB) Positive, Stool Occult Blood #2 No 

specimen received, Stool Occult Blood #3 No specimen received


02/27/17 04:25: Reticulocyte % (Auto) 3.35, Absolute Retic 0.0905, Retic Hgb 

Equivalent 29.4, Iron 22 L, TIBC 266, % Saturation 8, Unsat Iron Binding 244, 

Ferritin 75.44, Vitamin B12 > 2000 H, Folate 9.5





ASSESSMENT:  


1.  COPD exacerbation secondary to bronchitis


2.  Hypoxemia secondary to above


3.  Anemia with positive occult blood


4.  Hypertension


5.  Dyslipidemia


6.  Nicotine use





PLAN:


1.  Discharge home


2.  Keflex 500 mg b.i.d. for 5 days


3.  Prednisone 10 mg twice each day for five days


4.  Evaluate for home oxygen


5.  Nicotine use and its side effects discussed with the patient to include 

risk of various cancers, lung, colon, et cetera. 


6.  Followup in the office within 5 days


7.  Will recheck hemoglobin





Plan and coordination of the patient's care discussed in the presence of Case 

Manager and nurse.





EDUCATION:


 Nicotine use and its side effects discussed with the patient to include risk 

of various cancers, lung, colon, et cetera. Advised the patient to quit.  Also 

discussed with the patient concerning the need for colonoscopy.  She is advised 

to have one.  She has agreed for this procedure.  The patient states that she 

has seen Dr. Smith in the past. Ilana,   will get referral.  The 

patient voices understanding and agrees.  








CONDITION:  STABLE











SCRIBED BY:


IRIS CANELA  scribed while in presence of service performed by 

Dr. MOY URBAN on 02/28/17 (0805)

## 2017-02-28 NOTE — PN
DATE OF SERVICE:  02/25/17



SUBJECTIVE:  

The patient is a 71 year old white female hospitalized with upper respiratory 
tract infection and shortness of breath with bronchitis. The patient has been a 
smoker and also has cavitary mass, lesion likely neoplasm followed by pulmonary 
physician. The patient's CT scan of the chest was repeated and showed increased 
right upper lobe cavitary lesion from 2013. The possibility of neoplasm or 
infectious cavity lesion was noted. It is to be noted that the patient has no 
symptoms of tuberculosis. Her weight has been stable and she doesn't have low 
grade fever and in fact she doesn't have any hemoptysis. 



REVIEW OF SYSTEMS: 

CONSTITUTIONAL: No night sweats. No fatigue, malaise, lethargy. No fever or 
chills.

HEENT: Eyes: No visual changes. No eye pain. No eye discharge. ENT: No runny 
nose. No epistaxis. No sinus pain. No sore throat. No odynophagia. No 
congestion. Hoariness of the voice. 

RESPIRATORY: mild  cough and congestion. No hemoptysis. She says that she 
practically coughed all night. The cough was dry. No PND. No orthopnea. 

CARDIOVASCULAR: No angina symptoms. No CHF symptoms. No atypical chest pain for 
CAD. No palpitations. No shortness of breath. The patient has pleuritic type of 
pain at times. 

GASTROINTESTINAL: No abdominal pain. No nausea or vomiting. No diarrhea or 
constipation. No hematemesis. No hematochezia. Appetite is better. 

GENITOURINARY: No urgency. No frequency. No dysuria. No hematuria. No 
obstructive symptoms. No discharge. No pain. No significant abnormal bleeding.

MUSCULOSKELETAL: No musculoskeletal pain; no joint swelling. 

NEUROLOGICAL:  No headache. No neck pain. No syncope. No seizures. No dizziness.

PSYCHIATRIC: Not anxious. No depression. No suicidal thoughts. No homicidal 
thoughts.

SKIN:  No rash. No lesions. No wounds.

ENDOCRINE: No unexplained weight loss. No weight gain. 

HEMATOLOGIC/LYMPHATIC: No anemia. No purpura. No petechiae. No prolonged or 
excessive bleeding. No palpable lymph nodes.



PHYSICAL EXAMINATION: 

GENERAL:  The patient is oriented to time, place and person.  

VITAL SIGNS:  Temperature 97.5, pulse 80, respiratory rate 22, blood pressure 
143/65 and pulse ox 99%.

HEENT:  Head normocephalic, atraumatic.  Eyes: Extraocular muscles are intact.  
Pupils are equal, round and reactive to light and accommodation.  Ears:  No 
lesions.  Nose appeared normal. Throat: No exudate or erythema.

NECK: Supple. No JVD, no carotid bruit.  No lymphadenopathy or thyromegaly. 

LUNGS: Decreased breath sounds with mild wheeze.  Clear to auscultation.  
Percussion note normal.  Chest symmetrical. 

HEART:  S1, S2, no S3. No murmurs.  No cyanosis or clubbing.  No ascites.  
Pulses: Dorsalis pedis and posterior tibial pulses +1 to +2 both sides. 

ABDOMEN:  Soft.  Nontender.  Bowel sounds active. No CVA tenderness. No mass 
felt. 

EXTREMITIES:  No edema.  Full range of motion of all extremities, equal. 

NEUROLOGIC:  No focal deficit. Cranial nerves II through XII are grossly 
intact.  No headache, no double vision or headache. 

SKIN: Not dry.  Intact.  Turgor - normal. 

LYMPHATIC:  No palpable lymph nodes/no lymphedema. 

MUSCULOSKELETAL:  Normal joints with no swelling. Muscle tone is normal. 



LABS:

Hgb 9.6, hct 30, WBC 12,000 normal differential, creatinine 0.8, BUN 14, 
potassium 4.1 these labs were done yesterday. 



ASSESSMENT:

1. Acute bronchitis with chronic lung disease with history of smoking. 

2. Cavitary lesion present on the right upper cavity for long time, being 
followed by pulmonary MD

3. Depression 

4. Dyslipidemia 

5. Hypertension 

6. Generalized osteoarthritis



PLAN:

1. Continue DUO NEBS

2. Continue Inhalers

3. Continue IV antibiotics

4. Continue IV steroids 

5. Continue Oxygen supplement 

6. Will add Tussionex 1 teaspoon twice a day for coughing



CONDITION: Stable



Counseling for smoking done. 



TIME SPENT:  More than 30 minutes. 



Plan and coordination of the patient's care discussed in the presence of nurse. 
HOMERO

## 2017-03-01 NOTE — HP
DATE OF SERVICE:  02/24/17



REASON FOR HOSPITALIZATION: 

Cough, fever, aching all over. 



HISTORY OF PRESENT ILLNESS:

This is a 71-year-old female with complaints of being sick for 2 weeks with 
fever and chills, getting yellow-green sputum, lots of sinus drainage, sore 
throat, hurting all over, headache, shortness of breath even at rest. 



REVIEW OF SYSTEMS:

CONSTITUTIONAL:  Fever; fatigue. 

HEENT:  Sinus drainage. No sore throat.  

RESPIRATORY:  Cough. No congestion. 

CARDIOVASCULAR:  Shortness of breath is present.  No atypical chest pain for 
coronary artery disease.  No angina, CHF symptoms, palpitations.   

GASTROINTESTINAL:  No melena or abdominal pain.  No GERD.

GENITOURINARY:  No hematuria,  no polyuria.

CNS:  No blackout, no dizziness, no headache, no double vision.  

MUSCULOSKELETAL:  Osteoarthritis pain.  No joint swelling.

ENDOCRINE:  No weight loss, no weight gain.

SKIN:  Not dry, no rash.

PSYCHIATRIC:  Anxious.  No depression, no suicidal thoughts, no homicidal 
thoughts. 



PAST MEDICAL HISTORY: 

1.   COPD

2.   CONTINUED SMOKING

3.   DJD SPINE, SEVERE

4.   DYSLIPIDEMIA

5.   HYPERTENSION

6.   DEPRESSION

7.   PERIPHERAL ARTERIAL DISEASE 



PAST SURGICAL HISTORY:

1.  HYSTERECTOMY

2.  CEA BILATERALLY

3.  LEFT BREAST LUMPECTOMY



SOCIAL HISTORY: 

The patient smokes, two packs.  No alcohol use.  . 



FAMILY HISTORY: 

COPD, hypertension. 



MEDICATIONS: (HOME)

1.  Fenofibrate 160 mg one tablet p.o. once daily

2.  Citalopram 20 mg 1 1/2 tablet p.o. once a day

3.  Plavix 75 mg one p.o. daily

4.  Lisinopril 20 mg one tablet p.o. two times per day

5.  Cimetidine 400 mg tablet two tablets p.o. two times per day

6.  Simvastatin 40 mg p.o. once daily in the evening

7.  Alprazolam 0.5 mg p.o. three times per day p.r.n.

8.  Combivent  mcg/actuation two puffs by inhalation route four times per 
day

9.  Hydrocodone-acetaminophen 7.5-325 mg p.o. two times per day as needed for 
pain

10. Albuterol Sulfate 2.5 mg/0.5 mL solution for nebulization four times per 
day as needed

11. Calcium 600 +D daily

12. Spiriva one capsule by inhalation once daily

13. Fish Oil 360-1,200 mg capsule p.o. three times per day

14. Travatan Z 0.004 % drops one drop into both eyes by ophthalmic route once 
daily in the evening

15. Timolol 0.25% instill one drop into both eyes by ophthalmic route two times 
per day

16. Aspirin 81 mg p.o. once daily



ALLERGIES:  NKDA



PHYSICAL EXAMINATION:

V/S:   Pulse 92, /70, temperature 100.8, 02 sat 85%. Weight 124.2 pounds. 
Height 5'5",  BMI 20.7

GENERAL APPEARANCE:  Oriented times three.  The patient is ill-appearing. Dry 
mucosa.

HEENT: Normal.

NECK: No JVP, no bruits. 

RESPIRATORY: Audible wheeze and crackles with decreased entry.

CARDIOVASCULAR:  S1, S2, no S3, no murmurs.  No cyanosis, clubbing.  No ascites.

GI/ABDOMEN:  No tenderness.  Bowel sounds are active. 

EXTREMITIES: No  edema, pulses +1, equal.

CNS:  Deep tendon reflexes, sensory, motor and gait all normal. 

RECTAL/PELVIC: Colonoscopy screening - Dr. Smith; refused repeat. Pelvic: 
Hysterectomy, CA cervix. Refused mammogram. 



ASSESSMENT:

1.   SHORTNESS OF BREATH SECONDARY TO UPPER RESPIRATORY TRACT INFECTION/
PNEUMONIA, R/O FLU

2.   HYPOXEMIA

3.   COPD - CONTINUED SMOKING

4.   DJD SPINE, SEVERE

5.   DYSLIPIDEMIA

6.   HYPERTENSION

7.   DEPRESSION

8.   PERIPHERAL ARTERIAL DISEASE - CEA



PLAN: (Admit to regular floor with telemetry protocol)

1.    CBC, CMP

2.    BNP

3.    ABG on room air

4.    CT chest with contrast

5.    Rapid flu A & B

6.    Blood culture with sputum cultures

7.    IVF at 

8.    Rocephin 1 gm IV daily

9.    Zithromycin 500 mg IV daily

10.  Duoneb q.6hr

11.  Solu-Medrol 125 mg q.8hr

12.  Tessalon Perles 200 mg q.i.d. 

13.  Continue home medications



TIME SPENT: More than 70 minutes. 
MTDD

## 2017-03-02 NOTE — ECHO2D
Date of Exam: 2/28/17   Ordering Physician: ASHLEIGH WATSON

Reason for Echo: HTN, SOB, RESPIRATORY FAILURE

Auscultation: S1, S2







M-Mode  Normal Adult Results LV Dimensions Normal Adult Results

 

AoV Opening excursions       >1.6  >1.6 LVEDD-base-     3.5-5.8  4.2

 

Ao root dimensions      2.0-3.7  2.8 LVESD-base-     3.1-4.6  

 

L. Atrium dimensions      1.9-3.8  3.8 Post. Wall thickness     0.8-1.1  1.2

 

IV septum (thickness)      0.7-1.2  1.2 Post. Wall excursion     0.72-1.3  
NORMAL

 

Septal motion   NORMAL Systolic motion   

 

R. Ventricular cavity     1.5-2.0  NORMAL LVEF       60%  53%

 

Paradoxical septal wall motion   NORMAL    



2-D : NORMAL LEFT VENTRICULAR CONTRACTILITY--NORMAL VALVES--NO EFFUSION, NO 
THROMBUS, NORMAL LEFT ATRIAL AND LEFT VENTRICLE CAVITIES





M-MODE:

MV:  NORMAL

AV:  NORMAL

TV:  NORMAL

PV:  

CHAMBER SIZE:  NORMAL

WALL MOTION:  NORMAL

PERICARDIUM:  NORMAL



INTERPRETATION:  

1. BORDERLINE LEFT VENTRICULAR HYPERTROPHY

2. NORMAL LEFT VENTRICULAR CONTRACTILITY

3. NORMAL VALVES

MTDD

## 2017-03-02 NOTE — DS
DATE OF SERVICE:  02/28/17



FINAL DIAGNOSIS: 

1. Upper respiratory infection 

2. Shortness of breath secondary to the upper respiratory infection 

3. Anemia secondary to the chronic disease and GI bleed, positive Occult blood 
test, awaiting for the GI evaluation

4. Hypoxemia which is better 

5. COPD, continued smoking 

6. Hypertension

7. Dyslipidemia 

8. Depression 

9. DJD spine 

10.Infrarenal fusiform abdominal aortic aneurysm, 3.3cm 

11.Hysterectomy

12.Left breast lumpectomy



VITAL AT THE TIME OF DISCHARGE:

Blood pressure 146/70, respiratory rate 20, heart rate 62, temperature 98.0 and 
saturation 92%.



DISCHARGE INSTRUCTIONS:

Discharge home today. Continue home medication. Followup in the office within 5-
7 days. 



MEDICATIONS AT DISCHARGE:

Norco

Combivent 

Xanax 

Aspirin

Calcium

Celexa

Plavix

Triglide

Ferrous sulfate

Omega 3

Zestril 

Protonix

Zocor

Eye drops

Travatan 



ALLERGIES:

No known allergies 



NEW PRESCRIPTIONS:

Keflex 500mg twice a day for 5 days

Please continue and finish the Medrol Dosepak from the hospital 



DIET INSTRUCTIONS: 

Regular



ACTIVITY:

Gradually resume as tolerated



SMOKING:

Advised to stop smoking 



DISEASE SPECIFIC EDUCATION:

COPD, Exacerbation and Pneumonia been discussed

Pneumonia vaccination been discussed

Anemia and the Risk of GI bleed and colon cancer discussed and she verbalized 
understanding and wants to go for the colonoscopy now, which has been scheduled 
as outpatient. 



HOSPITAL COURSE:

Kriss Greenberg who is a 71 year old female came to the office coughing, congestion
, shortness of breath and saturation was 85% on the room air, respiratory rate 
was almost 30. At that time the patient was admitted from the office directly. 
ABG showed pH 7.408, pCO2 48, pO2 51 and BNP was 505. CT of the chest showed 
the questionable inflammation and increasing in the cavitary lesion. The 
patient was already seeing Dr. Velazquez in the past for the cavitary lesion. No 
biopsy was done. The patient was put on the Rocephin, Azithromycin, Solu-Medrol 
and breathing treatment. Hgb was 9.6 and dropped to 8.1, stool for Occult blood 
test was done which is positive. Influenza negative. CT of abdomen and pelvis 
done which showed intrarenal aneurysm 3.3cm. Hgb was steady then and did not 
drop. It went up to 8.4 and the patient finally agreed to do the colonoscopy 
and today she was feeling better but stress from the patient's brother's health 
otherwise she was willing to go home. At that time she was discharged home and 
strictly explained about the anemia. Continue to take iron tablets extra pill 
and GI consultation. 



TIME SPENT: More than 45 minutes today. 

HOMERO

## 2017-03-07 ENCOUNTER — OFFICE VISIT (OUTPATIENT)
Dept: GASTROENTEROLOGY | Facility: CLINIC | Age: 72
End: 2017-03-07

## 2017-03-07 VITALS
SYSTOLIC BLOOD PRESSURE: 132 MMHG | HEART RATE: 77 BPM | BODY MASS INDEX: 20.16 KG/M2 | DIASTOLIC BLOOD PRESSURE: 68 MMHG | WEIGHT: 121 LBS | HEIGHT: 65 IN | OXYGEN SATURATION: 95 %

## 2017-03-07 DIAGNOSIS — Z79.02 PLATELET INHIBITION DUE TO PLAVIX: ICD-10-CM

## 2017-03-07 DIAGNOSIS — R19.5 HEME POSITIVE STOOL: ICD-10-CM

## 2017-03-07 DIAGNOSIS — D50.9 IRON DEFICIENCY ANEMIA, UNSPECIFIED IRON DEFICIENCY ANEMIA TYPE: Primary | ICD-10-CM

## 2017-03-07 DIAGNOSIS — I10 HTN (HYPERTENSION), BENIGN: ICD-10-CM

## 2017-03-07 DIAGNOSIS — Z78.9 NONSMOKER: ICD-10-CM

## 2017-03-07 PROCEDURE — 99204 OFFICE O/P NEW MOD 45 MIN: CPT | Performed by: CLINICAL NURSE SPECIALIST

## 2017-03-07 RX ORDER — CLOPIDOGREL BISULFATE 75 MG/1
TABLET ORAL
COMMUNITY

## 2017-03-07 RX ORDER — ESCITALOPRAM OXALATE 10 MG/1
10 TABLET ORAL DAILY
COMMUNITY

## 2017-03-07 RX ORDER — SIMVASTATIN 40 MG
TABLET ORAL
COMMUNITY

## 2017-03-07 RX ORDER — LISINOPRIL 20 MG/1
TABLET ORAL
COMMUNITY

## 2017-03-07 RX ORDER — PANTOPRAZOLE SODIUM 40 MG/1
40 TABLET, DELAYED RELEASE ORAL 2 TIMES DAILY
COMMUNITY

## 2017-03-07 RX ORDER — ALPRAZOLAM 0.5 MG/1
0.5 TABLET ORAL 3 TIMES DAILY PRN
COMMUNITY

## 2017-03-07 RX ORDER — FENOFIBRATE 160 MG/1
TABLET ORAL
COMMUNITY

## 2017-03-07 RX ORDER — HYDROCODONE BITARTRATE AND ACETAMINOPHEN 7.5; 325 MG/1; MG/1
1 TABLET ORAL 2 TIMES DAILY
COMMUNITY

## 2017-03-07 NOTE — PROGRESS NOTES
Chief Complaint   Patient presents with   • GI Problem     New patient ref by Dr. Bruner for anemia and heme + stools     Subjective   HPI  Taty Sánchez is a 71 y.o. female who presents with a complaint of anemia and heme positive stool. The anemia she has had for years she says chronicity is unknown worsening on recent routine labs. She has a hx of B12 def that she would get shots for monthly. She has not had this in months. On 2/28/2017 H/H was 8.4/26.3 MCV 96. On 2/9/2017 H/H was 11.7/36.  Iron profile showed Iron low at 22, Ferritin 75.44 B12 greater than 2000. Her stool was positive for occult blood found incidentally. New finding for her.   She has had a colonoscopy years ago with Dr Guevara he removed polyps she says but unsure of how many. No previous upper Endoscopy. She was taking Ibuprofen but it started burning her stomach and she stopped within the past 6 months. She took it daily for 2-3 months. No family hx for colon cancer.    Past Medical History   Diagnosis Date   • Anemia    • Arthritis    • COPD (chronic obstructive pulmonary disease)    • Depression    • Depression    • Dyslipidemia    • History of colonic polyps    • Hypercholesteremia    • Hypertension    • Nicotine use disorder    • Peripheral arterial disease      Past Surgical History   Procedure Laterality Date   • Hysterectomy     • Breast surgery       Lumpectomy   • Neck surgery       Carotid artery surgery   • Cataract extraction       Outpatient Prescriptions Marked as Taking for the 3/7/17 encounter (Office Visit) with MATTIE Mcconnell   Medication Sig Dispense Refill   • ALPRAZolam (XANAX) 0.5 MG tablet 3 (Three) Times a Day.     • aspirin 81 MG tablet Take 81 mg by mouth     • calcium citrate-vitamin d (CALCITRATE) 315-250 MG-UNIT tablet tablet Take 1 tablet by mouth 2 times daily (with meals)     • clopidogrel (PLAVIX) 75 MG tablet Take 75 mg by mouth daily     • escitalopram (LEXAPRO) 10 MG tablet Take 10 mg by mouth  Daily.     • fenofibrate 160 MG tablet Take 160 mg by mouth daily     • HYDROcodone-acetaminophen (NORCO) 7.5-325 MG per tablet 3 (Three) Times a Day.     • lisinopril (PRINIVIL,ZESTRIL) 20 MG tablet Take 20 mg by mouth daily     • pantoprazole (PROTONIX) 40 MG EC tablet Take 40 mg by mouth Daily.     • simvastatin (ZOCOR) 40 MG tablet Take 40 mg by mouth nightly     • travoprost, benzalkonium, (TRAVATAN) 0.004 % ophthalmic solution Place 1 drop into both eyes nightly       Allergies no known allergies  Social History     Social History   • Marital status:      Spouse name: N/A   • Number of children: N/A   • Years of education: N/A     Occupational History   • Not on file.     Social History Main Topics   • Smoking status: Former Smoker   • Smokeless tobacco: Not on file      Comment: Stopped 1 week ago   • Alcohol use No   • Drug use: Not on file   • Sexual activity: Not on file     Other Topics Concern   • Not on file     Social History Narrative     Family History   Problem Relation Age of Onset   • Colon cancer Neg Hx    • Colon polyps Neg Hx      Health Maintenance   Topic Date Due   • MEDICARE ANNUAL WELLNESS  1945   • TDAP/TD VACCINES (1 - Tdap) 08/30/1962   • ZOSTER VACCINE  08/30/2003   • PNEUMOCOCCAL VACCINES (65+ LOW/MEDIUM RISK) (1 of 2 - PCV13) 08/30/2008   • INFLUENZA VACCINE  08/01/2016     Review of Systems   Constitutional: Positive for fatigue (somewhat improved since hospitalization). Negative for activity change, appetite change, chills, diaphoresis, fever and unexpected weight change.   HENT: Negative for ear pain, hearing loss, mouth sores, sore throat, trouble swallowing and voice change.    Eyes: Negative.    Respiratory: Negative for cough, choking, shortness of breath and wheezing.    Cardiovascular: Negative for chest pain and palpitations.   Gastrointestinal: Negative for abdominal pain, blood in stool, constipation, diarrhea, nausea and vomiting.        Heme positive stool   "  Endocrine: Negative for cold intolerance and heat intolerance.   Genitourinary: Negative for decreased urine volume, dysuria, frequency, hematuria and urgency.   Musculoskeletal: Negative for back pain, gait problem and myalgias.   Skin: Negative for color change, pallor and rash.   Allergic/Immunologic: Negative for food allergies and immunocompromised state.   Neurological: Negative for dizziness, tremors, seizures, syncope, weakness, light-headedness, numbness and headaches.   Hematological: Negative for adenopathy. Does not bruise/bleed easily.   Psychiatric/Behavioral: Negative for agitation and confusion. The patient is not nervous/anxious.    All other systems reviewed and are negative.    Objective   Vitals:    03/07/17 1322   BP: 132/68   Pulse: 77   SpO2: 95%   Weight: 121 lb (54.9 kg)   Height: 65\" (165.1 cm)     Body mass index is 20.14 kg/(m^2).  Physical Exam   Constitutional: She is oriented to person, place, and time. She appears well-developed and well-nourished.   HENT:   Head: Normocephalic and atraumatic.   Eyes: Pupils are equal, round, and reactive to light.   Neck: Normal range of motion. Neck supple. No tracheal deviation present.   Cardiovascular: Normal rate, regular rhythm and normal heart sounds.  Exam reveals no gallop and no friction rub.    No murmur heard.  Pulmonary/Chest: Effort normal. No respiratory distress. She has wheezes (left side exp wheezes noted). She has no rales. She exhibits no tenderness.   Abdominal: Soft. Bowel sounds are normal. She exhibits no distension. There is no hepatosplenomegaly. There is no tenderness. There is no rigidity, no rebound and no guarding.   Musculoskeletal: Normal range of motion. She exhibits no edema, tenderness or deformity.   Neurological: She is alert and oriented to person, place, and time. She has normal reflexes.   Skin: Skin is warm and dry. No rash noted. No pallor.   Psychiatric: She has a normal mood and affect. Her behavior is " normal. Judgment and thought content normal.     Assessment/Plan   Taty was seen today for gi problem.    Diagnoses and all orders for this visit:    Iron deficiency anemia, unspecified iron deficiency anemia type  -     Case Request; Standing  -     Implement Anesthesia Orders Day of Procedure; Standing  -     Obtain Informed Consent; Standing  -     Verify Informed Consent; Standing  -     Verify bowel prep was successful; Standing  -     Case Request  -     Case Request; Standing  -     Implement Anesthesia Orders Day of Procedure; Standing  -     Obtain Informed Consent; Standing  -     Verify Informed Consent; Standing    Heme positive stool    Nonsmoker  Comments:  quit x 1 week    HTN (hypertension), benign  Comments:  continue BP medication the am of procedure    Platelet inhibition due to Plavix  Comments:  to hold per Dr Bruner she takes due to hx of carotid disease    will try to get records from her previous colonoscopy with Dr Guevara to chart for review.   COLONOSCOPY WITH ANESTHESIA (N/A)  EMR Dragon/transcription disclaimer: Much of this encounter note is electronic transcription/translation of spoken language to printed text. The electronic translation of spoken language may be erroneous, or at times, nonsensical words or phrases may be inadvertently transcribed. Although I have reviewed the note for such errors, some may still exist.  Body mass index is 20.14 kg/(m^2).  No Follow-up on file.  There are no Patient Instructions on file for this visit.    All risks, benefits, alternatives, and indications of colonoscopy and/or Endoscopy procedure have been discussed with the patient. Risks to include perforation of the colon requiring possible surgery or colostomy, risk of bleeding from biopsies or removal of colon tissue, possibility of missing a colon polyp or cancer, or adverse drug reaction.  Benefits to include the diagnosis and management of disease of the colon and rectum. Alternatives to  include barium enema, radiographic evaluation, lab testing or no intervention. Pt verbalizes understanding and agrees.

## 2017-03-15 ENCOUNTER — TELEPHONE (OUTPATIENT)
Dept: GASTROENTEROLOGY | Facility: CLINIC | Age: 72
End: 2017-03-15

## 2017-03-15 NOTE — TELEPHONE ENCOUNTER
Let patient know received coag sheet from fariba Christine to hold Plavix 7 days prior to procedure, no Lovenox needed.  Procedure scheduled for 3/28

## 2017-03-28 ENCOUNTER — HOSPITAL ENCOUNTER (OUTPATIENT)
Dept: HOSPITAL 58 - SURG | Age: 72
Discharge: HOME | End: 2017-03-28
Attending: INTERNAL MEDICINE

## 2017-03-28 ENCOUNTER — OUTSIDE FACILITY SERVICE (OUTPATIENT)
Dept: GASTROENTEROLOGY | Facility: CLINIC | Age: 72
End: 2017-03-28

## 2017-03-28 VITALS — BODY MASS INDEX: 19.3 KG/M2

## 2017-03-28 VITALS — SYSTOLIC BLOOD PRESSURE: 151 MMHG | TEMPERATURE: 99 F | DIASTOLIC BLOOD PRESSURE: 65 MMHG

## 2017-03-28 DIAGNOSIS — D12.0: ICD-10-CM

## 2017-03-28 DIAGNOSIS — K57.30: ICD-10-CM

## 2017-03-28 DIAGNOSIS — D50.9: ICD-10-CM

## 2017-03-28 DIAGNOSIS — R19.5: Primary | ICD-10-CM

## 2017-03-28 DIAGNOSIS — K44.9: ICD-10-CM

## 2017-03-28 DIAGNOSIS — D12.2: ICD-10-CM

## 2017-03-28 DIAGNOSIS — Q27.33: ICD-10-CM

## 2017-03-28 DIAGNOSIS — D12.5: ICD-10-CM

## 2017-03-28 PROCEDURE — 45385 COLONOSCOPY W/LESION REMOVAL: CPT | Performed by: INTERNAL MEDICINE

## 2017-03-28 PROCEDURE — 43235 EGD DIAGNOSTIC BRUSH WASH: CPT | Performed by: INTERNAL MEDICINE

## 2017-03-28 PROCEDURE — 88305 TISSUE EXAM BY PATHOLOGIST: CPT | Performed by: INTERNAL MEDICINE

## 2017-03-28 PROCEDURE — 45380 COLONOSCOPY AND BIOPSY: CPT | Performed by: INTERNAL MEDICINE

## 2017-03-28 PROCEDURE — 94640 AIRWAY INHALATION TREATMENT: CPT

## 2017-03-29 ENCOUNTER — LAB REQUISITION (OUTPATIENT)
Dept: LAB | Facility: HOSPITAL | Age: 72
End: 2017-03-29

## 2017-03-29 DIAGNOSIS — K63.5 POLYP OF COLON: ICD-10-CM

## 2017-03-29 NOTE — OP
INDICATIONS FOR PROCEDURE:  71-year-old female presents for colonoscopy exam. 
She has a history of iron deficiency anemia and was found to have heme positive 
stools. 



MEDICATIONS:  SEE ANESTHESIA NOTES.



PROCEDURE:  

1.  ENDOSCOPY.

2.  COLONOSCOPY, SNARE POLYPECTOMY.



REPORT:  The risks, benefits, alternatives and limitations were discussed in 
detail with the patient.  Informed consent was obtained.



After adequate sedation was achieved, the video endoscope was introduced in the 
posterior pharynx and esophagus under direct vision and easily advanced down to 
the second portion and beginning of the third portion of the duodenum.  I then 
slowly withdrew.  In the second portion of the duodenum, there was four small 
angiectasias. These were only a few millimeter in size. The bulb was relatively 
unremarkable. The antrum and body were relatively unremarkable. The antrum and 
body were relatively unremarkable. The scope was retroflexed to look at the 
cardia and fundus which revealed a hiatal hernia. The scope was anteflexed and 
withdrawn back through the esophagus which was unremarkable. The patient 
tolerated this procedure well. 



The patient's bed was turned and a digital rectal exam revealed good tone, no 
masses. A colonoscope was introduced into the rectum and advanced under direct 
visual guidance to the cecum. The cecum was identified by the appendiceal 
orifice and IC valve. At the junction of the ascending colon and cecum, there 
is a raised lesion about 7 mm in greatest size that appeared to be a 
hyperplastic type polyp. It had a central cavity.  Question if this is a 
diverticulum.  This was adjacent to the IC valve with the IC valve to the left; 
this was to the right of it on the same fold. I do not believe it it was a 
fistula tract. It appeared benign. It was not amenable to removal by snare 
technique as it was relatively flat with a volcano type opening. I therefore 
biopsied this a couple of times for histological review and had the suggestion 
of a pancreatic rest.  I then slowly withdrew the scope in a circumferential 
manner examining the remaining colon. In the ascending colon there was a 6 mm 
sessile polyp that I removed by snare technique. At 25 cm there was a 7 or 8 mm 
semi sessile polyp that I removed by snare technique. In the distal sigmoid 
there was a benign appearing 6 or 7 mm sessile polyp that I removed by snare 
technique. There were a few diverticula scattered throughout the sigmoid colon. 
No other abnormalities noted including on retroflex view of the anal canal. The 
patient tolerated the procedure well with stable vital signs and pulse oximetry 
throughout. 



IMPRESSION: 

1.   Several small AVMs in the duodenum.

2.   Abnormal mucosa next to the IC valve suggestive of pancreatic rest. This 
could have been a abnormal polyp or diverticulum. 

3.   Three (3) colonic polyps removed.

4.   Mild sigmoid diverticulosis.



RECOMMENDATIONS:

1.   Iron supplementation

2.   Await pathology results from the biopsies of the lesion next to the IC 
valve. If this is completely benign tissue, then no further investigation is 
warranted. If it does show a precancerous type of tissue, then consider repeat 
colonoscopy examination with attempted removal in three to six months. This 
would be considered as a first option with possible saline injection versus 
surgical resection. 

3.   If the lesion next to the IC valve is completely benign as above, then 
with the other polyps, I suggest a repeat colonoscopy examination again in 
three years, sooner if signs or symptoms were to indicate otherwise. 

4.   Will see her back in the office as needed but will contact her with the 
pathology results. 



CC:  DR. SHIRLEY VALENTIN

## 2017-03-31 LAB
CYTO UR: NORMAL
LAB AP CASE REPORT: NORMAL
LAB AP CLINICAL INFORMATION: NORMAL
Lab: NORMAL
PATH REPORT.FINAL DX SPEC: NORMAL
PATH REPORT.GROSS SPEC: NORMAL

## 2017-04-04 ENCOUNTER — TELEPHONE (OUTPATIENT)
Dept: GASTROENTEROLOGY | Facility: CLINIC | Age: 72
End: 2017-04-04

## 2017-04-04 NOTE — TELEPHONE ENCOUNTER
I called and discussed her biopsy results.  We discussed the lesion at the cecal junction and the pathology showing serrated adenoma.  I discussed with her what this meant.  On her prior colonoscopy, I attempted to remove this by snare technique but it would not lasso the lesion.  I was not aggressive as it appeared to be benign tissue.    I therefore recommend we reattempt colonoscopy and polypectomy.  We may need to use saline injection or similar technique.  She expressed good understanding and agrees to set up repeat colonoscopy.  She wants to wait a little while and I think June is reasonable.    Please contact her and schedule a colonoscopy examination for June.  I recommend this be done at [facility] this time.  She is in agreement.  Same prep as last time.

## 2017-04-05 DIAGNOSIS — D50.9 IRON DEFICIENCY ANEMIA, UNSPECIFIED IRON DEFICIENCY ANEMIA TYPE: Primary | ICD-10-CM

## 2017-04-10 RX ORDER — POLYETHYLENE GLYCOL 3350, SODIUM CHLORIDE, SODIUM BICARBONATE, POTASSIUM CHLORIDE 420; 11.2; 5.72; 1.48 G/4L; G/4L; G/4L; G/4L
4000 POWDER, FOR SOLUTION ORAL SEE ADMIN INSTRUCTIONS
Qty: 4000 ML | Refills: 0 | Status: ON HOLD | OUTPATIENT
Start: 2017-04-10 | End: 2017-01-01

## 2017-04-12 NOTE — PN
DATE OF SERVICE:  02/27/17



SUBJECTIVE: The patient was admitted with COPD exacerbation, shortness of breath
, hypoxemia.  The patient still having some coughing and shortness of breath 
with minimal exertion.



REVIEW OF SYSTEMS: 

CONSTITUTIONAL: No fever, no chills.  

HEENT:  Normal.

ENDOCRINE: No weight gain, no weight loss.

CVS:  No angina symptoms. No CHF symptoms.  No palpitations.  No atypical chest 
pain for CAD.  Shortness of breath. No PND, no orthopnea. 

RESPIRATORY:  Cough, no hemoptysis. 

GI:  No nausea, no vomiting.  No abdominal pain. 

:  No hematuria. No polyuria. 

MUSCULOSKELETAL:.  No joint swelling. 

PSYCHIATRIC: Not anxious. No depression. No suicidal thoughts. No homicidal 
thoughts. 

SKIN: Intact. No rash. 



PHYSICAL EXAMINATION:  

V/S: Blood pressure 114/67, respiratory rate 20, heart rate 66, temperature 97.0
, saturation 97.

HEENT: Normocephalic, atraumatic. Mucosa dry. Pallor positive.  No icterus.

NECK:  Supple.  No JVD, no carotid bruit. No lymphadenopathy.

LUNGS:  Decreased with basilar crackles. No rales or rhonchi. 

HEART:  S1, S2 normal. No S3. No murmur, gallop or regurgitation. 

ABDOMEN: Soft, nontender. Bowel sounds active. No rigidity. No rebound or 
guarding. No CVA tenderness. 

EXTREMITIES: No clubbing, cyanosis or pedal edema. 

MUSCULOSKELETAL:  No joint swelling. 

NEUROLOGIC: Awake, alert, oriented times three.  No focal deficit. 

LYMPHATIC: No lymph nodes palpable. 

SKIN: Intact.



LABS:  White count is 8.38, hemoglobin 8.1, hematocrit 25.5, platelet count 278
, sodium 134, potassium 4.6, chloride 91, bicarb 35, BUN 21, creatinine 0.81, 
glucose 158.  



ASSESSMENT: 

1.  CHRONIC OBSTRUCTIVE PULMONARY DISEASE EXACERBATION SECONDARY TO BRONCHITIS 
AND UPPER RESPIRATORY INFECTION

2.  HISTORY OF CORONARY ARTERY DISEASE, STATUS POST STENT

3.  CHRONIC OBSTRUCTIVE PULMONARY DISEASE

4.  CONTINUED SMOKING

5.  HYPERTENSION

6.  DYSLIPIDEMIA

7.  DEPRESSION

8.  DJD OF THE SPINE

9.  GERD

10. HYSTERECTOMY

11. LEFT BREAST LUMPECTOMY

12. CATARACT SURGERY



PLAN:

1.  Continue Rocephin, Zithromycin.

2.  Lovenox for the DVT prophylaxis.

3.  Out of bed to chair.

4.  Activity as tolerated.



TIME SPENT: More than 30 minutes
MTDD

## 2017-06-05 NOTE — PLAN OF CARE
Problem: GI Endoscopy (Adult)  Goal: Signs and Symptoms of Listed Potential Problems Will be Absent or Manageable (GI Endoscopy)  Outcome: Outcome(s) achieved Date Met:  06/05/17 06/05/17 0816   GI Endoscopy   Problems Assessed (GI Endoscopy) all   Problems Present (GI Endoscopy) none

## 2017-06-05 NOTE — PLAN OF CARE
Problem: Patient Care Overview (Adult)  Goal: Plan of Care Review  Outcome: Outcome(s) achieved Date Met:  06/05/17 06/05/17 0817   Coping/Psychosocial Response Interventions   Plan Of Care Reviewed With patient;spouse   Outcome Evaluation   Outcome Summary/Follow up Plan discharge criteria met

## 2017-06-05 NOTE — ANESTHESIA POSTPROCEDURE EVALUATION
Patient: Taty Sánchez    Procedure Summary     Date Anesthesia Start Anesthesia Stop Room / Location    06/05/17 0749 0814 [facility] ENDOSCOPY 2 / [facility]       Procedure Diagnosis Surgeon Provider    COLONOSCOPY WITH ANESTHESIA (N/A ) Iron deficiency anemia, unspecified iron deficiency anemia type  (Iron deficiency anemia, unspecified iron deficiency anemia type [D50.9]) MD Edmundo Allen CRNA          Anesthesia Type: general  Last vitals  BP      Temp      Pulse     Resp      SpO2        Post Anesthesia Care and Evaluation    Patient location during evaluation: PHASE II  Patient participation: complete - patient participated  Level of consciousness: awake and awake and alert  Pain score: 0  Pain management: adequate  Airway patency: patent  Anesthetic complications: No anesthetic complications  PONV Status: none  Cardiovascular status: acceptable and blood pressure returned to baseline  Respiratory status: acceptable  Hydration status: acceptable

## 2017-06-05 NOTE — ANESTHESIA PREPROCEDURE EVALUATION
Anesthesia Evaluation     Patient summary reviewed and Nursing notes reviewed   no history of anesthetic complications:  NPO Solid Status: > 8 hours  NPO Liquid Status: > 4 hours     Airway   Mallampati: II  TM distance: <3 FB  Neck ROM: full  no difficulty expected  Dental    (+) lower dentures and upper dentures    Pulmonary    (+) a smoker Current, COPD mild,   Cardiovascular     (+) hypertension well controlled, cardiac stents more than 12 months ago PVD, hyperlipidemia      Neuro/Psych  (+) psychiatric history Depression,    GI/Hepatic/Renal/Endo - negative ROS     Musculoskeletal     Abdominal    Substance History - negative use     OB/GYN negative ob/gyn ROS         Other   (+) arthritis                                     Anesthesia Plan    ASA 3     general     intravenous induction   Anesthetic plan and risks discussed with patient.

## 2017-06-05 NOTE — H&P
[facility] Gastroenterology  Pre Procedure History & Physical    Chief Complaint:   Heme positive stools    Subjective     HPI:   Check colonoscopy March finding a serrated adenomatous polyp next to the IC valve.  This was just biopsied as it did not appear to be polyp pathology did show it to be a serrated adenoma    Past Medical History:   Past Medical History:   Diagnosis Date   • Anemia    • Arthritis    • COPD (chronic obstructive pulmonary disease)    • Depression    • Depression    • Dyslipidemia    • History of colonic polyps    • Hypercholesteremia    • Hypertension    • Nicotine use disorder    • Peripheral arterial disease        Past Surgical History:  [unfilled]    Family History:  Family History   Problem Relation Age of Onset   • Colon cancer Neg Hx    • Colon polyps Neg Hx        Social History:   reports that she has been smoking Cigarettes.  She has been smoking about 1.00 pack per day. She does not have any smokeless tobacco history on file. She reports that she does not drink alcohol.    Medications:   Prior to Admission medications    Medication Sig Start Date End Date Taking? Authorizing Provider   ALPRAZolam (XANAX) 0.5 MG tablet 3 (Three) Times a Day.   Yes Historical Provider, MD   aspirin 81 MG tablet Take 81 mg by mouth   Yes Historical Provider, MD   dorzolamide-timolol (COSOPT) 22.3-6.8 MG/ML ophthalmic solution Administer 1 drop to both eyes 2 (Two) Times a Day.   Yes Historical Provider, MD   escitalopram (LEXAPRO) 10 MG tablet Take 10 mg by mouth Daily.   Yes Historical Provider, MD   fenofibrate 160 MG tablet Take 160 mg by mouth daily   Yes Historical Provider, MD   ferrous sulfate 325 (65 FE) MG tablet Take 325 mg by mouth Every 12 (Twelve) Hours.   Yes Historical Provider, MD   HYDROcodone-acetaminophen (NORCO) 7.5-325 MG per tablet 3 (Three) Times a Day.   Yes Historical Provider, MD   ipratropium-albuterol (COMBIVENT RESPIMAT)  MCG/ACT inhaler Inhale 1 puff 4 (Four) Times a  "Day As Needed for Wheezing.   Yes Historical Provider, MD   lisinopril (PRINIVIL,ZESTRIL) 20 MG tablet Take 20 mg by mouth daily   Yes Historical Provider, MD   pantoprazole (PROTONIX) 40 MG EC tablet Take 40 mg by mouth Daily.   Yes Historical Provider, MD   simvastatin (ZOCOR) 40 MG tablet Take 40 mg by mouth nightly   Yes Historical Provider, MD   travoprost, benzalkonium, (TRAVATAN) 0.004 % ophthalmic solution Place 1 drop into both eyes nightly   Yes Historical Provider, MD   calcium citrate-vitamin d (CALCITRATE) 315-250 MG-UNIT tablet tablet Take 1 tablet by mouth 2 times daily (with meals)    Historical Provider, MD   clopidogrel (PLAVIX) 75 MG tablet Take 75 mg by mouth daily    Historical Provider, MD   polyethylene glycol (GoLYTELY) 236 G solution Take as directed by office instructions. 3/7/17 6/5/17  MATTIE Mcconnell   polyethylene glycol-electrolytes (NULYTELY WITH FLAVOR PACKS) 420 G solution Take 4,000 mL by mouth See Admin Instructions. 4/10/17 6/5/17  Loi Guevara MD       Allergies:  Review of patient's allergies indicates no known allergies.    Objective     Blood pressure 170/86, pulse 104, temperature 97.4 °F (36.3 °C), temperature source Temporal Artery , resp. rate 20, height 65\" (165.1 cm), weight 120 lb (54.4 kg), SpO2 93 %.    Physical Exam   Constitutional: Pt is oriented to person, place, and in no distress.   HENT: Mouth/Throat: Oropharynx is clear.   Cardiovascular: Normal rate, regular rhythm.    Pulmonary/Chest: Effort normal. No respiratory distress. No  wheezes.   Abdominal: Soft. Non-distended.  Skin: Skin is warm and dry.   Psychiatric: Mood, memory, affect and judgment appear normal.     Assessment/Plan     Diagnosis:  Positive stool  Adenomatous polyp    Anticipated Surgical Procedure:  Colonoscopy    The risks, benefits, and alternatives of this procedure have been discussed with the patient or the responsible party- the patient understands and agrees to " proceed.    EMR Dragon/transcription disclaimer:  Much of this encounter note is electronic transcription/translation of spoken language to printed text.  The electronic translation of spoken language may be erroneous, or at times, nonsensical words or phrases may be inadvertently transcribed.  Although I have reviewed the note for such errors, some may still exist.

## 2017-06-05 NOTE — PLAN OF CARE
Problem: Patient Care Overview (Adult)  Goal: Plan of Care Review  Outcome: Ongoing (interventions implemented as appropriate)    06/05/17 0800   Coping/Psychosocial Response Interventions   Plan Of Care Reviewed With patient   Patient Care Overview   Progress no change   Outcome Evaluation   Outcome Summary/Follow up Plan tolerating well         Problem: GI Endoscopy (Adult)  Goal: Signs and Symptoms of Listed Potential Problems Will be Absent or Manageable (GI Endoscopy)  Outcome: Ongoing (interventions implemented as appropriate)

## 2017-06-05 NOTE — PLAN OF CARE
Problem: Patient Care Overview (Adult)  Goal: Adult Individualization and Mutuality  Outcome: Ongoing (interventions implemented as appropriate)    06/05/17 0710   Individualization   Patient Specific Preferences none

## 2017-09-05 PROBLEM — J98.4 CAVITATING MASS OF LUNG: Status: ACTIVE | Noted: 2017-01-01

## 2017-09-05 NOTE — PROGRESS NOTES
"Taty Sánchez  1945  MD Cindi Benitez, Sumanth Reed MD    Chief Complaint   Patient presents with   • Lung Nodule     patient states she is here for a lung spot dr wray found        Present Illness:Mrs. Taty Sánchez is a 72-year-old  female who I initially saw in August 2016.  At that time I wrote\" she has had a  bronchoscopy by Dr. Wray and the right upper lobe washings were\" highly suspicious for non-small cell carcinoma\".  Pulmonary function testing performed on 07/28/2016 showed greatly decreased pulmonary function and diffusing capacity.  I talked with Dr. Wray on the phone today and also with the patient.  The patient says that she has been scheduled to see an oncologist the very near future.  Dr. Wray is going to see her in follow-up but at the present time no operative procedures is planned.  Both the patient and Dr. Wray are going to contact me if I need to do anything further\".    Dr. Wray has been following the patient.  A recent CT scan showed progression of the right upper lobe cavitary lesion reached in size to 5.3 cm and the wall of the lesion had increased in thickness to 2 cm.  A cavitary neoplasm was favored.  The lesion does abut the superior pleural surface.  Other lung nodules seen previously are stable.    Recent pulmonary function tests are better than previously but still somewhat poor.  Her FEV1 is 51% of predicted.  FVC is 66% predicted and her D/VA is 95.56% of predicted which is much better than previously.    She is a heavy smoker having smoked 1.5 packs of cigarettes a day since age 13 and she continues to smoke now.  She has only had one episode of hemoptysis some years ago.  She denies any recent weight loss or change in voice.  She has not been exposed to carcinogens except for cigarette smoke.    With this history Dr. Wray referred her again to me for the possibility of resection.  If she was not an operative candidate then possibly a needle biopsy " could be taken so that a diagnosis could be established and possibly chemotherapy and radiation therapy undertaken.    Past Medical History:   Diagnosis Date   • Anemia    • Arthritis    • COPD (chronic obstructive pulmonary disease)    • Depression    • Depression    • Dyslipidemia    • History of colonic polyps    • History of transfusion 2017    2 pints    • Hypercholesteremia    • Hypertension    • Nicotine use disorder    • Peripheral arterial disease        Past Surgical History:   Procedure Laterality Date   • BREAST SURGERY      Lumpectomy   • CATARACT EXTRACTION     • COLONOSCOPY N/A 6/5/2017    Procedure: COLONOSCOPY WITH ANESTHESIA;  Surgeon: Loi Guevara MD;  Location: [facility] ENDOSCOPY;  Service:    • CORONARY ANGIOPLASTY WITH STENT PLACEMENT     • HYSTERECTOMY     • NECK SURGERY      Carotid artery surgery       No Known Allergies      Current Outpatient Prescriptions:   •  ALPRAZolam (XANAX) 0.5 MG tablet, 3 (Three) Times a Day., Disp: , Rfl:   •  aspirin 81 MG tablet, Take 81 mg by mouth, Disp: , Rfl:   •  calcium citrate-vitamin d (CALCITRATE) 315-250 MG-UNIT tablet tablet, Take 1 tablet by mouth 2 times daily (with meals), Disp: , Rfl:   •  clopidogrel (PLAVIX) 75 MG tablet, Take 75 mg by mouth daily, Disp: , Rfl:   •  dorzolamide-timolol (COSOPT) 22.3-6.8 MG/ML ophthalmic solution, Administer 1 drop to both eyes 2 (Two) Times a Day., Disp: , Rfl:   •  escitalopram (LEXAPRO) 10 MG tablet, Take 10 mg by mouth Daily., Disp: , Rfl:   •  fenofibrate 160 MG tablet, Take 160 mg by mouth daily, Disp: , Rfl:   •  ferrous sulfate 325 (65 FE) MG tablet, Take 325 mg by mouth Every 12 (Twelve) Hours., Disp: , Rfl:   •  HYDROcodone-acetaminophen (NORCO) 7.5-325 MG per tablet, 3 (Three) Times a Day., Disp: , Rfl:   •  ipratropium-albuterol (COMBIVENT RESPIMAT)  MCG/ACT inhaler, Inhale 1 puff 4 (Four) Times a Day As Needed for Wheezing., Disp: , Rfl:   •  lisinopril (PRINIVIL,ZESTRIL) 20 MG tablet, Take  "20 mg by mouth daily, Disp: , Rfl:   •  pantoprazole (PROTONIX) 40 MG EC tablet, Take 40 mg by mouth Daily., Disp: , Rfl:   •  simvastatin (ZOCOR) 40 MG tablet, Take 40 mg by mouth nightly, Disp: , Rfl:   •  travoprost, benzalkonium, (TRAVATAN) 0.004 % ophthalmic solution, Place 1 drop into both eyes nightly, Disp: , Rfl:     Family History   Problem Relation Age of Onset   • Cancer Sister    • Heart attack Brother    • Stroke Brother    • Heart attack Brother    • Heart attack Brother    • Colon cancer Neg Hx    • Colon polyps Neg Hx        Social History     Social History   • Marital status:      Spouse name: N/A   • Number of children: N/A   • Years of education: N/A     Social History Main Topics   • Smoking status: Current Every Day Smoker     Packs/day: 1.00     Types: Cigarettes   • Smokeless tobacco: Never Used   • Alcohol use No   • Drug use: No   • Sexual activity: Not on file     Other Topics Concern   • Not on file     Social History Narrative         Review of Systems   Constitutional: Negative for chills, fatigue, fever and unexpected weight change.   HENT: Negative for trouble swallowing and voice change.    Eyes: Negative.    Respiratory: Positive for cough and shortness of breath. Negative for wheezing and stridor.    Cardiovascular: Negative for chest pain and leg swelling.   Gastrointestinal: Positive for blood in stool.   Endocrine: Negative.    Genitourinary: Negative.    Musculoskeletal: Positive for arthralgias and back pain.   Skin: Negative.    Neurological: Positive for weakness. Negative for seizures and speech difficulty.   Psychiatric/Behavioral: Negative.        /74 (BP Location: Right arm, Patient Position: Sitting, Cuff Size: Adult)  Pulse 70  Ht 65\" (165.1 cm)  Wt 122 lb (55.3 kg)  SpO2 98%  BMI 20.3 kg/m2    Physical Exam  General: Well-developed, well-nourished in no distress.  HEENT: Eyes: Pupils equal round and react to light; extraocular movements intact       "          Nose mouth and throat are benign                Neck: Neck is supple without thyromegaly, mass or bruit.  Chest/Lungs: No chest wall abnormality.  Chest has some rales and wheezes throughout especially in the right upper lobe area.    Heart:: Regular rate and rhythm without murmur gallop or rub  Abdomen: Soft, nontender without rebound guarding or mass  Extremities: Without clubbing cyanosis or edema  Pulses: 2+ bilateral and equal throughout  Integument: No open sores or rashes  Neurologic: Cranial nerves II through XII are intact.  Motor and sensation are grossly normal    Assessment: Mrs. Sánchez has had a large cavitary right upper lobe mass which is enlarging and abuts the superior parietal pleura.  She has been surprisingly asymptomatic but has been a longtime smoker.  This mass is enlarging.  We do not have a diagnosis but neoplasm is suspected.    I have talked at length with Mrs. Sánchez and her son who accompanies her today.  I examined her and went over the CT scans myself and with her and her son.  She would be a high risk candidate for lobectomy but possibly could tolerate this.  Prior to proceeding we would have to have her cleared by cardiology as she has had a stenting procedure in the past and is currently on Plavix.  She has had extensive vascular surgery on her carotids by Dr. Shamar Ttius in the distant past and we would have to check this out.  Following this we could give her a better idea of the risks of an operative procedure.    If she did not want an operation we could try for a CT-guided needle biopsy.  If positive for cancer then she could begin chemotherapy and radiation therapy.  The other option is no treatment whatsoever although I told her that I did not think this was a very good long-term solution.  She wishes to think further about her options and she will call us with her decision.    We talked quite a bit about smoking cessation.The patient has a history of tobacco use.  We discussed tobacco cessation and its benefits in regards to her immediate recovery and the long-term benefits of avoidance of tobacco products. I informed her of the smoking cessation program available to her and the various pharmacological interventions that she should discuss  with her PCP.  I tried to impress upon her the benefit to her long-term health that tobacco cessation would convey.        Plan: As above.  Patient is going to call us with her decision.      Leo Underwood MD  09/05/17  1:53 PM

## 2017-10-17 NOTE — PROGRESS NOTES
Cardiothoracic surgery note for chart:    I talked with Dr. Sumanth Puente about Mrs. Taty Sánchez  today.  Her needle biopsy came back squamous cell carcinoma.  She is not really an operative treatment secondary to her poor pulmonary function.  Dr. Puente is going to follow up with her and get her in to see an oncologist.  I will sign off but because to re-see again if any problems.  I tried to contact her today was able to get a hold of her.  I will try again in the near future.    Leo Underwood M.D.  10/17/2017  12 1 9 PM

## 2017-11-03 ENCOUNTER — ANESTHESIA EVENT (OUTPATIENT)
Dept: OPERATING ROOM | Age: 72
End: 2017-11-03

## 2017-11-03 RX ORDER — SODIUM CHLORIDE, SODIUM LACTATE, POTASSIUM CHLORIDE, CALCIUM CHLORIDE 600; 310; 30; 20 MG/100ML; MG/100ML; MG/100ML; MG/100ML
INJECTION, SOLUTION INTRAVENOUS CONTINUOUS
Status: CANCELLED | OUTPATIENT
Start: 2017-11-03

## 2017-11-03 RX ORDER — LIDOCAINE HYDROCHLORIDE 10 MG/ML
1 INJECTION, SOLUTION EPIDURAL; INFILTRATION; INTRACAUDAL; PERINEURAL
Status: CANCELLED | OUTPATIENT
Start: 2017-11-03 | End: 2017-11-03

## 2017-11-07 ENCOUNTER — HOSPITAL ENCOUNTER (OUTPATIENT)
Dept: GENERAL RADIOLOGY | Age: 72
Discharge: HOME | End: 2017-11-07
Payer: MEDICARE

## 2017-11-07 ENCOUNTER — HOSPITAL ENCOUNTER (OUTPATIENT)
Dept: LAB | Age: 72
Discharge: HOME | End: 2017-11-07
Payer: MEDICARE

## 2017-11-07 ENCOUNTER — HOSPITAL ENCOUNTER (OUTPATIENT)
Dept: NON INVASIVE DIAGNOSTICS | Age: 72
Discharge: HOME | End: 2017-11-07
Payer: MEDICARE

## 2017-11-07 DIAGNOSIS — C34.90 MALIGNANT NEOPLASM OF LUNG, UNSPECIFIED LATERALITY, UNSPECIFIED PART OF LUNG (HCC): ICD-10-CM

## 2017-11-07 LAB
ALBUMIN SERPL-MCNC: 4.1 G/DL (ref 3.5–5.2)
ALP BLD-CCNC: 69 U/L (ref 35–104)
ALT SERPL-CCNC: 7 U/L (ref 5–33)
ANION GAP SERPL CALCULATED.3IONS-SCNC: 10 MMOL/L (ref 7–19)
AST SERPL-CCNC: 21 U/L (ref 5–32)
BASOPHILS ABSOLUTE: 0 K/UL (ref 0–0.2)
BASOPHILS RELATIVE PERCENT: 0.6 % (ref 0–1)
BILIRUB SERPL-MCNC: <0.2 MG/DL (ref 0.2–1.2)
BUN BLDV-MCNC: 16 MG/DL (ref 8–23)
CALCIUM SERPL-MCNC: 10.7 MG/DL (ref 8.8–10.2)
CHLORIDE BLD-SCNC: 98 MMOL/L (ref 98–111)
CO2: 32 MMOL/L (ref 22–29)
CREAT SERPL-MCNC: 0.7 MG/DL (ref 0.5–0.9)
EOSINOPHILS ABSOLUTE: 0.3 K/UL (ref 0–0.6)
EOSINOPHILS RELATIVE PERCENT: 4 % (ref 0–5)
GFR NON-AFRICAN AMERICAN: >60
GLUCOSE BLD-MCNC: 83 MG/DL (ref 74–109)
HCT VFR BLD CALC: 39.2 % (ref 37–47)
HEMOGLOBIN: 11.9 G/DL (ref 12–16)
LYMPHOCYTES ABSOLUTE: 1.6 K/UL (ref 1.1–4.5)
LYMPHOCYTES RELATIVE PERCENT: 25 % (ref 20–40)
MCH RBC QN AUTO: 31.1 PG (ref 27–31)
MCHC RBC AUTO-ENTMCNC: 30.4 G/DL (ref 33–37)
MCV RBC AUTO: 102.3 FL (ref 81–99)
MONOCYTES ABSOLUTE: 0.7 K/UL (ref 0–0.9)
MONOCYTES RELATIVE PERCENT: 10.3 % (ref 0–10)
NEUTROPHILS ABSOLUTE: 3.8 K/UL (ref 1.5–7.5)
NEUTROPHILS RELATIVE PERCENT: 59.6 % (ref 50–65)
PDW BLD-RTO: 13.9 % (ref 11.5–14.5)
PLATELET # BLD: 216 K/UL (ref 130–400)
PMV BLD AUTO: 10 FL (ref 9.4–12.3)
POTASSIUM SERPL-SCNC: 4.7 MMOL/L (ref 3.5–5)
RBC # BLD: 3.83 M/UL (ref 4.2–5.4)
SODIUM BLD-SCNC: 140 MMOL/L (ref 136–145)
TOTAL PROTEIN: 7 G/DL (ref 6.6–8.7)
WBC # BLD: 6.3 K/UL (ref 4.8–10.8)

## 2017-11-07 PROCEDURE — 93005 ELECTROCARDIOGRAM TRACING: CPT

## 2017-11-07 PROCEDURE — 71020 XR CHEST STANDARD TWO VW: CPT

## 2017-11-09 ENCOUNTER — HOSPITAL ENCOUNTER (OUTPATIENT)
Dept: GENERAL RADIOLOGY | Age: 72
Discharge: HOME | End: 2017-11-09
Payer: MEDICARE

## 2017-11-09 ENCOUNTER — ANESTHESIA (OUTPATIENT)
Dept: OPERATING ROOM | Age: 72
End: 2017-11-09

## 2017-11-09 ENCOUNTER — HOSPITAL ENCOUNTER (OUTPATIENT)
Age: 72
Setting detail: OUTPATIENT SURGERY
Discharge: HOME | End: 2017-11-09
Attending: SPECIALIST | Admitting: SPECIALIST

## 2017-11-09 VITALS
TEMPERATURE: 98.9 F | BODY MASS INDEX: 20.66 KG/M2 | HEART RATE: 103 BPM | HEIGHT: 65 IN | DIASTOLIC BLOOD PRESSURE: 71 MMHG | SYSTOLIC BLOOD PRESSURE: 102 MMHG | WEIGHT: 124 LBS | OXYGEN SATURATION: 94 % | RESPIRATION RATE: 16 BRPM

## 2017-11-09 VITALS — SYSTOLIC BLOOD PRESSURE: 105 MMHG | OXYGEN SATURATION: 99 % | DIASTOLIC BLOOD PRESSURE: 66 MMHG

## 2017-11-09 DIAGNOSIS — C34.90 MALIGNANT NEOPLASM OF LUNG, UNSPECIFIED LATERALITY, UNSPECIFIED PART OF LUNG (HCC): ICD-10-CM

## 2017-11-09 DIAGNOSIS — C34.90 MALIGNANT NEOPLASM OF LUNG, UNSPECIFIED LATERALITY, UNSPECIFIED PART OF LUNG (HCC): Primary | ICD-10-CM

## 2017-11-09 DIAGNOSIS — R52 PAIN: ICD-10-CM

## 2017-11-09 PROCEDURE — 71010 XR CHEST LIMITED ONE VIEW: CPT

## 2017-11-09 PROCEDURE — C1788 PORT, INDWELLING, IMP: HCPCS | Performed by: SPECIALIST

## 2017-11-09 PROCEDURE — 3209999900 FLUORO FOR SURGICAL PROCEDURES

## 2017-11-09 PROCEDURE — G8918 PT W/O PREOP ORDER IV AB PRO: HCPCS

## 2017-11-09 PROCEDURE — G8907 PT DOC NO EVENTS ON DISCHARG: HCPCS

## 2017-11-09 PROCEDURE — 36561 INSERT TUNNELED CV CATH: CPT

## 2017-11-09 DEVICE — PORT VASC ACCS SGL LUMN DETACHED SIL CATH 9.6 FR SMRT PRT: Type: IMPLANTABLE DEVICE | Site: CHEST | Status: FUNCTIONAL

## 2017-11-09 RX ORDER — SODIUM CHLORIDE, SODIUM LACTATE, POTASSIUM CHLORIDE, CALCIUM CHLORIDE 600; 310; 30; 20 MG/100ML; MG/100ML; MG/100ML; MG/100ML
INJECTION, SOLUTION INTRAVENOUS CONTINUOUS
Status: DISCONTINUED | OUTPATIENT
Start: 2017-11-09 | End: 2017-11-09 | Stop reason: HOSPADM

## 2017-11-09 RX ORDER — HYDROMORPHONE HCL 110MG/55ML
0.5 PATIENT CONTROLLED ANALGESIA SYRINGE INTRAVENOUS EVERY 5 MIN PRN
Status: DISCONTINUED | OUTPATIENT
Start: 2017-11-09 | End: 2017-11-09 | Stop reason: HOSPADM

## 2017-11-09 RX ORDER — MEPERIDINE HYDROCHLORIDE 25 MG/ML
12.5 INJECTION INTRAMUSCULAR; INTRAVENOUS; SUBCUTANEOUS EVERY 5 MIN PRN
Status: DISCONTINUED | OUTPATIENT
Start: 2017-11-09 | End: 2017-11-09 | Stop reason: HOSPADM

## 2017-11-09 RX ORDER — HYDROCODONE BITARTRATE AND ACETAMINOPHEN 7.5; 325 MG/1; MG/1
1 TABLET ORAL EVERY 4 HOURS PRN
Qty: 30 TABLET | Refills: 0
Start: 2017-11-09 | End: 2017-11-16

## 2017-11-09 RX ORDER — LABETALOL HYDROCHLORIDE 5 MG/ML
5 INJECTION, SOLUTION INTRAVENOUS EVERY 10 MIN PRN
Status: DISCONTINUED | OUTPATIENT
Start: 2017-11-09 | End: 2017-11-09 | Stop reason: HOSPADM

## 2017-11-09 RX ORDER — MORPHINE SULFATE 10 MG/ML
2 INJECTION, SOLUTION INTRAMUSCULAR; INTRAVENOUS EVERY 5 MIN PRN
Status: DISCONTINUED | OUTPATIENT
Start: 2017-11-09 | End: 2017-11-09 | Stop reason: HOSPADM

## 2017-11-09 RX ORDER — CEFAZOLIN SODIUM 1 G/3ML
INJECTION, POWDER, FOR SOLUTION INTRAMUSCULAR; INTRAVENOUS PRN
Status: DISCONTINUED | OUTPATIENT
Start: 2017-11-09 | End: 2017-11-09 | Stop reason: SDUPTHER

## 2017-11-09 RX ORDER — PROMETHAZINE HYDROCHLORIDE 25 MG/ML
6.25 INJECTION, SOLUTION INTRAMUSCULAR; INTRAVENOUS
Status: DISCONTINUED | OUTPATIENT
Start: 2017-11-09 | End: 2017-11-09 | Stop reason: HOSPADM

## 2017-11-09 RX ORDER — FENTANYL CITRATE 50 UG/ML
INJECTION, SOLUTION INTRAMUSCULAR; INTRAVENOUS PRN
Status: DISCONTINUED | OUTPATIENT
Start: 2017-11-09 | End: 2017-11-09 | Stop reason: SDUPTHER

## 2017-11-09 RX ORDER — HYDRALAZINE HYDROCHLORIDE 20 MG/ML
5 INJECTION INTRAMUSCULAR; INTRAVENOUS EVERY 10 MIN PRN
Status: DISCONTINUED | OUTPATIENT
Start: 2017-11-09 | End: 2017-11-09 | Stop reason: HOSPADM

## 2017-11-09 RX ORDER — DILTIAZEM HYDROCHLORIDE 60 MG/1
60 TABLET, FILM COATED ORAL 2 TIMES DAILY
COMMUNITY

## 2017-11-09 RX ORDER — LORAZEPAM 0.5 MG/1
0.5 TABLET ORAL EVERY 6 HOURS PRN
COMMUNITY

## 2017-11-09 RX ORDER — LIDOCAINE HYDROCHLORIDE 10 MG/ML
INJECTION, SOLUTION INFILTRATION; PERINEURAL PRN
Status: DISCONTINUED | OUTPATIENT
Start: 2017-11-09 | End: 2017-11-09 | Stop reason: HOSPADM

## 2017-11-09 RX ORDER — GABAPENTIN 300 MG/1
300 CAPSULE ORAL 2 TIMES DAILY
COMMUNITY

## 2017-11-09 RX ORDER — PRIMIDONE 50 MG/1
50 TABLET ORAL 3 TIMES DAILY
COMMUNITY

## 2017-11-09 RX ORDER — ENALAPRILAT 2.5 MG/2ML
1.25 INJECTION INTRAVENOUS
Status: DISCONTINUED | OUTPATIENT
Start: 2017-11-09 | End: 2017-11-09 | Stop reason: HOSPADM

## 2017-11-09 RX ORDER — METOCLOPRAMIDE HYDROCHLORIDE 5 MG/ML
10 INJECTION INTRAMUSCULAR; INTRAVENOUS
Status: DISCONTINUED | OUTPATIENT
Start: 2017-11-09 | End: 2017-11-09 | Stop reason: HOSPADM

## 2017-11-09 RX ORDER — OMEPRAZOLE 20 MG/1
20 CAPSULE, DELAYED RELEASE ORAL DAILY
COMMUNITY

## 2017-11-09 RX ORDER — EPHEDRINE SULFATE 50 MG/ML
INJECTION, SOLUTION INTRAVENOUS PRN
Status: DISCONTINUED | OUTPATIENT
Start: 2017-11-09 | End: 2017-11-09 | Stop reason: SDUPTHER

## 2017-11-09 RX ORDER — HYDROMORPHONE HCL 110MG/55ML
0.25 PATIENT CONTROLLED ANALGESIA SYRINGE INTRAVENOUS EVERY 5 MIN PRN
Status: DISCONTINUED | OUTPATIENT
Start: 2017-11-09 | End: 2017-11-09 | Stop reason: HOSPADM

## 2017-11-09 RX ORDER — MORPHINE SULFATE 10 MG/ML
4 INJECTION, SOLUTION INTRAMUSCULAR; INTRAVENOUS EVERY 5 MIN PRN
Status: DISCONTINUED | OUTPATIENT
Start: 2017-11-09 | End: 2017-11-09 | Stop reason: HOSPADM

## 2017-11-09 RX ORDER — PROPOFOL 10 MG/ML
INJECTION, EMULSION INTRAVENOUS PRN
Status: DISCONTINUED | OUTPATIENT
Start: 2017-11-09 | End: 2017-11-09 | Stop reason: SDUPTHER

## 2017-11-09 RX ORDER — TAMSULOSIN HYDROCHLORIDE 0.4 MG/1
0.4 CAPSULE ORAL DAILY
COMMUNITY

## 2017-11-09 RX ORDER — DIPHENHYDRAMINE HYDROCHLORIDE 50 MG/ML
12.5 INJECTION INTRAMUSCULAR; INTRAVENOUS
Status: DISCONTINUED | OUTPATIENT
Start: 2017-11-09 | End: 2017-11-09 | Stop reason: HOSPADM

## 2017-11-09 RX ADMIN — PROPOFOL 130 MG: 10 INJECTION, EMULSION INTRAVENOUS at 13:37

## 2017-11-09 RX ADMIN — CEFAZOLIN SODIUM 1000 MG: 1 INJECTION, POWDER, FOR SOLUTION INTRAMUSCULAR; INTRAVENOUS at 13:39

## 2017-11-09 RX ADMIN — EPHEDRINE SULFATE 10 MG: 50 INJECTION, SOLUTION INTRAVENOUS at 13:47

## 2017-11-09 RX ADMIN — SODIUM CHLORIDE, SODIUM LACTATE, POTASSIUM CHLORIDE, CALCIUM CHLORIDE: 600; 310; 30; 20 INJECTION, SOLUTION INTRAVENOUS at 12:43

## 2017-11-09 RX ADMIN — EPHEDRINE SULFATE 10 MG: 50 INJECTION, SOLUTION INTRAVENOUS at 13:59

## 2017-11-09 RX ADMIN — EPHEDRINE SULFATE 10 MG: 50 INJECTION, SOLUTION INTRAVENOUS at 13:51

## 2017-11-09 RX ADMIN — FENTANYL CITRATE 100 MCG: 50 INJECTION, SOLUTION INTRAMUSCULAR; INTRAVENOUS at 13:35

## 2017-11-09 ASSESSMENT — PAIN SCALES - GENERAL: PAINLEVEL_OUTOF10: 0

## 2017-11-09 ASSESSMENT — PAIN DESCRIPTION - PAIN TYPE: TYPE: SURGICAL PAIN

## 2017-11-09 ASSESSMENT — LIFESTYLE VARIABLES: SMOKING_STATUS: 1

## 2017-11-09 NOTE — ANESTHESIA PRE PROCEDURE
Department of Anesthesiology  Preprocedure Note       Name:  Yared Briones   Age:  74 y.o.  :  1945                                          MRN:  [id number]         Date:  2017      Surgeon: Bryn Hickman):  Lilla Phalen, MD    Procedure: Procedure(s):  PORT INSERTION    Medications prior to admission:   Prior to Admission medications    Medication Sig Start Date End Date Taking? Authorizing Provider   primidone (MYSOLINE) 50 MG tablet Take 50 mg by mouth 3 times daily   Yes Historical Provider, MD   gabapentin (NEURONTIN) 300 MG capsule Take 300 mg by mouth 2 times daily   Yes Historical Provider, MD   LORazepam (ATIVAN) 0.5 MG tablet Take 0.5 mg by mouth every 6 hours as needed for Anxiety .    Yes Historical Provider, MD   omeprazole (PRILOSEC) 20 MG delayed release capsule Take 20 mg by mouth daily   Yes Historical Provider, MD   diltiazem (CARDIZEM) 60 MG tablet Take 60 mg by mouth 2 times daily   Yes Historical Provider, MD   Roflumilast (DALIRESP) 500 MCG tablet Take 500 mcg by mouth daily   Yes Historical Provider, MD   tamsulosin (FLOMAX) 0.4 MG capsule Take 0.4 mg by mouth daily   Yes Historical Provider, MD   aspirin 81 MG tablet Take 81 mg by mouth    Historical Provider, MD   ALPRAZolam (XANAX) 0.5 MG tablet Take 0.5 mg by mouth 3 times daily as needed for Sleep    Historical Provider, MD   simvastatin (ZOCOR) 40 MG tablet Take 40 mg by mouth nightly    Historical Provider, MD   fenofibrate 160 MG tablet Take 160 mg by mouth daily    Historical Provider, MD   citalopram (CELEXA) 20 MG tablet Take 30 mg by mouth daily    Historical Provider, MD   clopidogrel (PLAVIX) 75 MG tablet Take 75 mg by mouth daily    Historical Provider, MD   lisinopril (PRINIVIL;ZESTRIL) 20 MG tablet Take 20 mg by mouth daily    Historical Provider, MD   calcium citrate-vitamin D (CITRICAL + D) 315-250 MG-UNIT TABS per tablet Take 1 tablet by mouth 2 times daily (with meals)    Historical Provider, MD albuterol-ipratropium (COMBIVENT)  MCG/ACT inhaler Inhale 2 puffs into the lungs every 6 hours as needed for Wheezing    Historical Provider, MD   sucralfate (CARAFATE) 1 GM tablet Take 1 g by mouth 4 times daily    Historical Provider, MD   dorzolamide-timolol (COSOPT) 22.3-6.8 MG/ML ophthalmic solution Place 1 drop into both eyes 2 times daily    Historical Provider, MD   travoprost, benzalkonium, (TRAVATAN) 0.004 % ophthalmic solution Place 1 drop into both eyes nightly    Historical Provider, MD   HYDROcodone-acetaminophen (Joyice Breeze) 7.5-325 MG per tablet Take 1 tablet by mouth 3 times daily as needed for Pain    Historical Provider, MD       Current medications:    Current Facility-Administered Medications   Medication Dose Route Frequency Provider Last Rate Last Dose    lactated ringers infusion   Intravenous Continuous Anamaria Schroeder  mL/hr at 11/09/17 1243         Allergies:  No Known Allergies    Problem List:  There is no problem list on file for this patient.       Past Medical History:        Diagnosis Date    Arthritis     CAD (coronary artery disease)     Cancer ([address]     Cataract     Chronic back pain     Colon polyps     [facility]     Diverticulosis     GERD (gastroesophageal reflux disease)     Glaucoma     History of stomach ulcers     Hyperlipidemia     Hypertension     Lesion of lung     Smoker        Past Surgical History:        Procedure Laterality Date    BRONCHOSCOPY      CORONARY ANGIOPLASTY WITH STENT PLACEMENT      HYSTERECTOMY      VASCULAR SURGERY         Social History:    Social History   Substance Use Topics    Smoking status: Current Every Day Smoker     Packs/day: 1.50    Smokeless tobacco: Not on file    Alcohol use No                                Ready to quit: Not Answered  Counseling given: Not Answered      Vital Signs (Current):   Vitals:    11/09/17 1223   BP: 130/73   Pulse: 87   Resp: 18   Temp: 99 °F (37.2 °C)   TempSrc: Temporal   SpO2: 94%   Weight: 124 lb (56.2 kg)   Height: 5' 5\" (1.651 m)                                              BP Readings from Last 3 Encounters:   11/09/17 130/73       NPO Status: Time of last liquid consumption: 1100 (sip water with meds)                        Time of last solid consumption: 2200                        Date of last liquid consumption: 11/09/17                        Date of last solid food consumption: 11/08/17    BMI:   Wt Readings from Last 3 Encounters:   11/09/17 124 lb (56.2 kg)   09/22/16 115 lb (52.2 kg)     Body mass index is 20.63 kg/m². CBC:   Lab Results   Component Value Date    WBC 6.3 11/07/2017    RBC 3.83 11/07/2017    HGB 11.9 11/07/2017    HCT 39.2 11/07/2017    .3 11/07/2017    RDW 13.9 11/07/2017     11/07/2017       CMP:   Lab Results   Component Value Date     11/07/2017    K 4.7 11/07/2017    CL 98 11/07/2017    CO2 32 11/07/2017    BUN 16 11/07/2017    CREATININE 0.7 11/07/2017    LABGLOM >60 11/07/2017    GLUCOSE 83 11/07/2017    PROT 7.0 11/07/2017    CALCIUM 10.7 11/07/2017    BILITOT <0.2 11/07/2017    ALKPHOS 69 11/07/2017    AST 21 11/07/2017    ALT 7 11/07/2017       POC Tests: No results for input(s): POCGLU, POCNA, POCK, POCCL, POCBUN, POCHEMO, POCHCT in the last 72 hours.     Coags: No results found for: PROTIME, INR, APTT    HCG (If Applicable): No results found for: PREGTESTUR, PREGSERUM, HCG, HCGQUANT     ABGs: No results found for: PHART, PO2ART, ITQ0WIO, DAW8HHA, BEART, T4KVEQUB     Type & Screen (If Applicable):  No results found for: LABABO, [address]    Anesthesia Evaluation  Patient summary reviewed and Nursing notes reviewed  Airway: Mallampati: I  TM distance: >3 FB   Neck ROM: full  Mouth opening: > = 3 FB Dental:    (+) upper dentures and lower dentures      Pulmonary:   (+) wheezes,                          ROS comment: Smokes 1 1/2 ppd x 58 years    Took 2 puffs of combivent inhaler at [employer] Cardiovascular:negative ROS  Exercise tolerance: good (>4 METS),   (+) CABG/stent: no interval change,         Rhythm: regular  Rate: normal           Beta Blocker:  Not on Beta Blocker      ROS comment: Stent RCA 2007     Neuro/Psych:   {neg ROS              GI/Hepatic/Renal:             Endo/Other: negative ROS                    Abdominal:           Vascular:                                    Anesthesia Plan      MAC     ASA 3       Induction: intravenous. MIPS: Postoperative opioids intended and Prophylactic antiemetics administered. Anesthetic plan and risks discussed with patient. Use of blood products discussed with patient whom. Plan discussed with CRNA.                 Tito Dalton CRNA   11/9/2017

## 2017-11-20 PROBLEM — C34.90 NON-SMALL CELL CARCINOMA OF LUNG (HCC): Status: ACTIVE | Noted: 2017-01-01

## 2017-11-28 NOTE — PROGRESS NOTES
11/28/17 0850 Called Dr. Oh's office and spoke with Tanika SHEIKH for orders. Received order to get CBC and call results. Katina Brown RN    11/28/17 1000 Called office with results of CBC and given ok to proceed and orders are being faxed. Katina Brown RN

## 2017-11-28 NOTE — PROGRESS NOTES
Social work consult requested due to distress level of 5-patient is seen in chemotherapy on this date.   She is being treated for lung cancer and reports that she has 10 tx left.  She reports that she has a lot of pain in her back, knees and other places.   Her primary care physician-Dr. Ghosh currently prescribes Narco twice a day but patient reports that she plans to talk with oncologist dr worthington about increasing this as that would help.  She reports that they have also put her on medication for nausea as well.  No other needs noted at this time.

## 2017-12-26 NOTE — PROGRESS NOTES
12/26/17 1043 Called Dr. Oh's office to report low b/p and it was closed. Answering service directed to Dr. HUFFMAN's office. Called and they said they were not on call. Let the answering service and Taylor Liang my supervisor know. Patient was asymptomatic. Patient reports will take b/p tonight before taking medication tonight and call primary care doctor if needed. Gave instruction on hypotension and to drink plenty of fluids today. Patient reports understanding. Katina Brown RN

## 2017-12-26 NOTE — PATIENT INSTRUCTIONS
Hypotension  As your heart beats, it forces blood through your arteries. This force is your blood pressure. If your blood pressure is too low for you to go about your normal activities or to support the organs of your body, you have hypotension. Hypotension is also referred to as low blood pressure. When your blood pressure becomes too low, you may not get enough blood to your brain. As a result, you may feel weak, feel lightheaded, or develop a rapid heart rate. In a more severe case, you may faint.  CAUSES  Various conditions can cause hypotension. These include:  · Blood loss.  · Dehydration.  · Heart or endocrine problems.  · Pregnancy.  · Severe infection.  · Not having a well-balanced diet filled with needed nutrients.  · Severe allergic reactions (anaphylaxis).  Some medicines, such as blood pressure medicine or water pills (diuretics), may lower your blood pressure below normal. Sometimes taking too much medicine or taking medicine not as directed can cause hypotension.  TREATMENT   Hospitalization is sometimes required for hypotension if fluid or blood replacement is needed, if time is needed for medicines to wear off, or if further monitoring is needed. Treatment might include changing your diet, changing your medicines (including medicines aimed at raising your blood pressure), and use of support stockings.  HOME CARE INSTRUCTIONS   · Drink enough fluids to keep your urine clear or pale yellow.  · Take your medicines as directed by your health care provider.  · Get up slowly from reclining or sitting positions. This gives your blood pressure a chance to adjust.  · Wear support stockings as directed by your health care provider.  · Maintain a healthy diet by including nutritious food, such as fruits, vegetables, nuts, whole grains, and lean meats.  SEEK MEDICAL CARE IF:  · You have vomiting or diarrhea.  · You have a fever for more than 2-3 days.  · You feel more thirsty than usual.  · You feel weak and  tired.  SEEK IMMEDIATE MEDICAL CARE IF:   · You have chest pain or a fast or irregular heartbeat.  · You have a loss of feeling in some part of your body, or you lose movement in your arms or legs.  · You have trouble speaking.  · You become sweaty or feel lightheaded.  · You faint.  MAKE SURE YOU:   · Understand these instructions.  · Will watch your condition.  · Will get help right away if you are not doing well or get worse.     This information is not intended to replace advice given to you by your health care provider. Make sure you discuss any questions you have with your health care provider.     Document Released: 12/18/2006 Document Revised: 10/08/2014 Document Reviewed: 06/20/2014  [employer] Interactive Patient Education ©2017 [employer]    Hypotension  As your heart beats, it forces blood through your body. This force is called blood pressure. If you have hypotension, you have low blood pressure. When your blood pressure is too low, you may not get enough blood to your brain. You may feel weak, feel lightheaded, have a fast heartbeat, or even pass out (faint).  HOME CARE  · Drink enough fluids to keep your pee (urine) clear or pale yellow.  · Take all medicines as told by your doctor.  · Get up slowly after sitting or lying down.  · Wear support stockings as told by your doctor.  · Maintain a healthy diet by including foods such as fruits, vegetables, nuts, whole grains, and lean meats.  GET HELP IF:  · You are throwing up (vomiting) or have watery poop (diarrhea).  · You have a fever for more than 2-3 days.  · You feel more thirsty than usual.  · You feel weak and tired.  GET HELP RIGHT AWAY IF:   · You pass out (faint).  · You have chest pain or a fast or irregular heartbeat.  · You lose feeling in part of your body.  · You cannot move your arms or legs.  · You have trouble speaking.  · You get sweaty or feel lightheaded.  MAKE SURE YOU:   · Understand these instructions.  · Will watch your  condition.  · Will get help right away if you are not doing well or get worse.     This information is not intended to replace advice given to you by your health care provider. Make sure you discuss any questions you have with your health care provider.     Document Released: 03/14/2011 Document Revised: 08/20/2014 Document Reviewed: 06/20/2014  [employer] Interactive Patient Education ©2017 [employer]

## 2018-01-01 ENCOUNTER — HOSPITAL ENCOUNTER (OUTPATIENT)
Dept: RADIATION ONCOLOGY | Facility: HOSPITAL | Age: 73
Discharge: HOME | End: 2018-04-01

## 2018-01-01 ENCOUNTER — HOSPITAL ENCOUNTER (OUTPATIENT)
Dept: RADIATION ONCOLOGY | Facility: HOSPITAL | Age: 73
Setting detail: RADIATION/ONCOLOGY SERIES
Discharge: HOME | End: 2018-03-01

## 2018-01-01 ENCOUNTER — APPOINTMENT (OUTPATIENT)
Dept: ONCOLOGY | Facility: HOSPITAL | Age: 73
End: 2018-01-01

## 2018-01-01 ENCOUNTER — INFUSION (OUTPATIENT)
Dept: ONCOLOGY | Facility: HOSPITAL | Age: 73
End: 2018-01-01

## 2018-01-01 ENCOUNTER — TRANSCRIBE ORDERS (OUTPATIENT)
Dept: ADMINISTRATIVE | Facility: HOSPITAL | Age: 73
End: 2018-01-01

## 2018-01-01 ENCOUNTER — HOSPITAL ENCOUNTER (OUTPATIENT)
Dept: RADIATION ONCOLOGY | Facility: HOSPITAL | Age: 73
Setting detail: RADIATION/ONCOLOGY SERIES
End: 2018-01-01

## 2018-01-01 ENCOUNTER — HOSPITAL ENCOUNTER (OUTPATIENT)
Dept: RADIATION ONCOLOGY | Facility: HOSPITAL | Age: 73
Setting detail: RADIATION/ONCOLOGY SERIES
Discharge: HOME | End: 2018-03-20

## 2018-01-01 ENCOUNTER — HOSPITAL ENCOUNTER (OUTPATIENT)
Dept: CT IMAGING | Facility: HOSPITAL | Age: 73
Discharge: HOME | End: 2018-02-08
Attending: INTERNAL MEDICINE | Admitting: INTERNAL MEDICINE

## 2018-01-01 ENCOUNTER — HOSPITAL ENCOUNTER (OUTPATIENT)
Dept: RADIATION ONCOLOGY | Facility: HOSPITAL | Age: 73
Setting detail: RADIATION/ONCOLOGY SERIES
Discharge: HOME | End: 2018-04-02

## 2018-01-01 ENCOUNTER — DOCUMENTATION (OUTPATIENT)
Dept: RADIATION ONCOLOGY | Facility: HOSPITAL | Age: 73
End: 2018-01-01

## 2018-01-01 ENCOUNTER — HOSPITAL ENCOUNTER (OUTPATIENT)
Dept: RADIATION ONCOLOGY | Facility: HOSPITAL | Age: 73
Setting detail: RADIATION/ONCOLOGY SERIES
Discharge: HOME | End: 2018-03-26

## 2018-01-01 ENCOUNTER — HOSPITAL ENCOUNTER (OUTPATIENT)
Dept: RADIATION ONCOLOGY | Facility: HOSPITAL | Age: 73
Setting detail: RADIATION/ONCOLOGY SERIES
Discharge: HOME | End: 2018-04-04

## 2018-01-01 ENCOUNTER — HOSPITAL ENCOUNTER (OUTPATIENT)
Dept: RADIATION ONCOLOGY | Facility: HOSPITAL | Age: 73
Setting detail: RADIATION/ONCOLOGY SERIES
Discharge: HOME | End: 2018-03-23

## 2018-01-01 ENCOUNTER — HOSPITAL ENCOUNTER (OUTPATIENT)
Dept: RADIATION ONCOLOGY | Facility: HOSPITAL | Age: 73
Setting detail: RADIATION/ONCOLOGY SERIES
Discharge: HOME | End: 2018-02-27

## 2018-01-01 ENCOUNTER — HOSPITAL ENCOUNTER (OUTPATIENT)
Dept: RADIATION ONCOLOGY | Facility: HOSPITAL | Age: 73
Setting detail: RADIATION/ONCOLOGY SERIES
Discharge: HOME | End: 2018-03-15

## 2018-01-01 ENCOUNTER — HOSPITAL ENCOUNTER (OUTPATIENT)
Dept: RADIATION ONCOLOGY | Facility: HOSPITAL | Age: 73
Setting detail: RADIATION/ONCOLOGY SERIES
Discharge: HOME | End: 2018-03-05

## 2018-01-01 ENCOUNTER — HOSPITAL ENCOUNTER (OUTPATIENT)
Dept: RADIATION ONCOLOGY | Facility: HOSPITAL | Age: 73
Setting detail: RADIATION/ONCOLOGY SERIES
Discharge: HOME | End: 2018-03-21

## 2018-01-01 ENCOUNTER — HOSPITAL ENCOUNTER (OUTPATIENT)
Dept: RADIATION ONCOLOGY | Facility: HOSPITAL | Age: 73
Setting detail: RADIATION/ONCOLOGY SERIES
Discharge: HOME | End: 2018-03-14

## 2018-01-01 ENCOUNTER — CONSULT (OUTPATIENT)
Dept: RADIATION ONCOLOGY | Facility: HOSPITAL | Age: 73
End: 2018-01-01

## 2018-01-01 ENCOUNTER — HOSPITAL ENCOUNTER (OUTPATIENT)
Dept: RADIATION ONCOLOGY | Facility: HOSPITAL | Age: 73
Setting detail: RADIATION/ONCOLOGY SERIES
Discharge: HOME | End: 2018-03-06

## 2018-01-01 ENCOUNTER — HOSPITAL ENCOUNTER (OUTPATIENT)
Dept: RADIATION ONCOLOGY | Facility: HOSPITAL | Age: 73
Setting detail: RADIATION/ONCOLOGY SERIES
Discharge: HOME | End: 2018-04-05

## 2018-01-01 ENCOUNTER — HOSPITAL ENCOUNTER (OUTPATIENT)
Dept: RADIATION ONCOLOGY | Facility: HOSPITAL | Age: 73
Setting detail: RADIATION/ONCOLOGY SERIES
Discharge: HOME | End: 2018-03-29

## 2018-01-01 ENCOUNTER — HOSPITAL ENCOUNTER (OUTPATIENT)
Dept: RADIATION ONCOLOGY | Facility: HOSPITAL | Age: 73
Setting detail: RADIATION/ONCOLOGY SERIES
Discharge: HOME | End: 2018-03-07

## 2018-01-01 ENCOUNTER — HOSPITAL ENCOUNTER (OUTPATIENT)
Dept: RADIATION ONCOLOGY | Facility: HOSPITAL | Age: 73
Setting detail: RADIATION/ONCOLOGY SERIES
Discharge: HOME | End: 2018-02-22

## 2018-01-01 ENCOUNTER — HOSPITAL ENCOUNTER (OUTPATIENT)
Dept: RADIATION ONCOLOGY | Facility: HOSPITAL | Age: 73
Setting detail: RADIATION/ONCOLOGY SERIES
Discharge: HOME | End: 2018-03-19

## 2018-01-01 ENCOUNTER — HOSPITAL ENCOUNTER (OUTPATIENT)
Dept: GENERAL RADIOLOGY | Facility: HOSPITAL | Age: 73
Discharge: HOME | End: 2018-04-16
Admitting: PHYSICIAN ASSISTANT

## 2018-01-01 ENCOUNTER — HOSPITAL ENCOUNTER (OUTPATIENT)
Dept: RADIATION ONCOLOGY | Facility: HOSPITAL | Age: 73
Setting detail: RADIATION/ONCOLOGY SERIES
Discharge: HOME | End: 2018-03-22

## 2018-01-01 ENCOUNTER — HOSPITAL ENCOUNTER (OUTPATIENT)
Dept: RADIATION ONCOLOGY | Facility: HOSPITAL | Age: 73
Setting detail: RADIATION/ONCOLOGY SERIES
Discharge: HOME | End: 2018-03-13

## 2018-01-01 ENCOUNTER — HOSPITAL ENCOUNTER (OUTPATIENT)
Dept: RADIATION ONCOLOGY | Facility: HOSPITAL | Age: 73
Setting detail: RADIATION/ONCOLOGY SERIES
Discharge: HOME | End: 2018-03-30

## 2018-01-01 ENCOUNTER — HOSPITAL ENCOUNTER (OUTPATIENT)
Dept: CT IMAGING | Facility: HOSPITAL | Age: 73
Discharge: HOME | End: 2018-04-18
Admitting: PHYSICIAN ASSISTANT

## 2018-01-01 ENCOUNTER — HOSPITAL ENCOUNTER (OUTPATIENT)
Dept: RADIATION ONCOLOGY | Facility: HOSPITAL | Age: 73
Setting detail: RADIATION/ONCOLOGY SERIES
Discharge: HOME | End: 2018-03-09

## 2018-01-01 ENCOUNTER — HOSPITAL ENCOUNTER (OUTPATIENT)
Dept: RADIATION ONCOLOGY | Facility: HOSPITAL | Age: 73
Setting detail: RADIATION/ONCOLOGY SERIES
Discharge: HOME | End: 2018-03-08

## 2018-01-01 ENCOUNTER — HOSPITAL ENCOUNTER (OUTPATIENT)
Dept: RADIATION ONCOLOGY | Facility: HOSPITAL | Age: 73
Discharge: HOME | End: 2018-02-14

## 2018-01-01 ENCOUNTER — HOSPITAL ENCOUNTER (OUTPATIENT)
Dept: RADIATION ONCOLOGY | Facility: HOSPITAL | Age: 73
Setting detail: RADIATION/ONCOLOGY SERIES
Discharge: HOME | End: 2018-04-06

## 2018-01-01 ENCOUNTER — APPOINTMENT (OUTPATIENT)
Dept: RADIATION ONCOLOGY | Facility: HOSPITAL | Age: 73
End: 2018-01-01

## 2018-01-01 ENCOUNTER — HOSPITAL ENCOUNTER (OUTPATIENT)
Dept: RADIATION ONCOLOGY | Facility: HOSPITAL | Age: 73
Setting detail: RADIATION/ONCOLOGY SERIES
Discharge: HOME | End: 2018-03-27

## 2018-01-01 ENCOUNTER — HOSPITAL ENCOUNTER (OUTPATIENT)
Dept: RADIATION ONCOLOGY | Facility: HOSPITAL | Age: 73
Setting detail: RADIATION/ONCOLOGY SERIES
Discharge: HOME | End: 2018-03-02

## 2018-01-01 ENCOUNTER — OFFICE VISIT (OUTPATIENT)
Dept: RADIATION ONCOLOGY | Facility: HOSPITAL | Age: 73
End: 2018-01-01

## 2018-01-01 ENCOUNTER — APPOINTMENT (OUTPATIENT)
Dept: LAB | Facility: HOSPITAL | Age: 73
End: 2018-01-01

## 2018-01-01 ENCOUNTER — DOCUMENTATION (OUTPATIENT)
Dept: ONCOLOGY | Facility: HOSPITAL | Age: 73
End: 2018-01-01

## 2018-01-01 ENCOUNTER — HOSPITAL ENCOUNTER (OUTPATIENT)
Dept: RADIATION ONCOLOGY | Facility: HOSPITAL | Age: 73
Setting detail: RADIATION/ONCOLOGY SERIES
Discharge: HOME | End: 2018-04-09

## 2018-01-01 ENCOUNTER — HOSPITAL ENCOUNTER (OUTPATIENT)
Dept: RADIATION ONCOLOGY | Facility: HOSPITAL | Age: 73
Discharge: HOME | End: 2018-03-16

## 2018-01-01 ENCOUNTER — HOSPITAL ENCOUNTER (OUTPATIENT)
Dept: RADIATION ONCOLOGY | Facility: HOSPITAL | Age: 73
Setting detail: RADIATION/ONCOLOGY SERIES
Discharge: HOME | End: 2018-02-28

## 2018-01-01 ENCOUNTER — HOSPITAL ENCOUNTER (OUTPATIENT)
Dept: RADIATION ONCOLOGY | Facility: HOSPITAL | Age: 73
Setting detail: RADIATION/ONCOLOGY SERIES
Discharge: HOME | End: 2018-04-03

## 2018-01-01 ENCOUNTER — HOSPITAL ENCOUNTER (OUTPATIENT)
Dept: RADIATION ONCOLOGY | Facility: HOSPITAL | Age: 73
Setting detail: RADIATION/ONCOLOGY SERIES
Discharge: HOME | End: 2018-03-28

## 2018-01-01 ENCOUNTER — HOSPITAL ENCOUNTER (OUTPATIENT)
Dept: RADIATION ONCOLOGY | Facility: HOSPITAL | Age: 73
Setting detail: RADIATION/ONCOLOGY SERIES
Discharge: HOME | End: 2018-02-26

## 2018-01-01 VITALS
DIASTOLIC BLOOD PRESSURE: 58 MMHG | SYSTOLIC BLOOD PRESSURE: 147 MMHG | BODY MASS INDEX: 21.33 KG/M2 | WEIGHT: 128 LBS | WEIGHT: 124 LBS | DIASTOLIC BLOOD PRESSURE: 64 MMHG | OXYGEN SATURATION: 100 % | HEART RATE: 78 BPM | RESPIRATION RATE: 18 BRPM | HEIGHT: 65 IN | HEART RATE: 57 BPM | RESPIRATION RATE: 16 BRPM | BODY MASS INDEX: 20.66 KG/M2 | OXYGEN SATURATION: 95 % | TEMPERATURE: 98 F | TEMPERATURE: 97 F | SYSTOLIC BLOOD PRESSURE: 92 MMHG | HEIGHT: 65 IN

## 2018-01-01 VITALS
RESPIRATION RATE: 18 BRPM | DIASTOLIC BLOOD PRESSURE: 72 MMHG | OXYGEN SATURATION: 95 % | HEART RATE: 72 BPM | SYSTOLIC BLOOD PRESSURE: 136 MMHG | HEIGHT: 65 IN | BODY MASS INDEX: 20.66 KG/M2 | TEMPERATURE: 98.2 F | WEIGHT: 124 LBS

## 2018-01-01 VITALS
HEIGHT: 65 IN | BODY MASS INDEX: 20.99 KG/M2 | TEMPERATURE: 97.1 F | WEIGHT: 126 LBS | RESPIRATION RATE: 16 BRPM | OXYGEN SATURATION: 98 % | SYSTOLIC BLOOD PRESSURE: 142 MMHG | DIASTOLIC BLOOD PRESSURE: 42 MMHG | HEART RATE: 84 BPM

## 2018-01-01 VITALS
HEIGHT: 65 IN | DIASTOLIC BLOOD PRESSURE: 56 MMHG | BODY MASS INDEX: 21.13 KG/M2 | OXYGEN SATURATION: 96 % | HEART RATE: 66 BPM | SYSTOLIC BLOOD PRESSURE: 126 MMHG | TEMPERATURE: 97.4 F | RESPIRATION RATE: 16 BRPM | WEIGHT: 126.8 LBS

## 2018-01-01 VITALS
RESPIRATION RATE: 16 BRPM | HEIGHT: 65 IN | DIASTOLIC BLOOD PRESSURE: 63 MMHG | BODY MASS INDEX: 20.49 KG/M2 | OXYGEN SATURATION: 95 % | HEART RATE: 84 BPM | TEMPERATURE: 97.8 F | WEIGHT: 123 LBS | SYSTOLIC BLOOD PRESSURE: 145 MMHG

## 2018-01-01 VITALS
SYSTOLIC BLOOD PRESSURE: 140 MMHG | DIASTOLIC BLOOD PRESSURE: 76 MMHG | BODY MASS INDEX: 20.83 KG/M2 | HEIGHT: 65 IN | WEIGHT: 125 LBS

## 2018-01-01 VITALS
OXYGEN SATURATION: 94 % | RESPIRATION RATE: 18 BRPM | BODY MASS INDEX: 20.16 KG/M2 | WEIGHT: 121 LBS | DIASTOLIC BLOOD PRESSURE: 53 MMHG | HEIGHT: 65 IN | HEART RATE: 70 BPM | SYSTOLIC BLOOD PRESSURE: 157 MMHG | TEMPERATURE: 97.2 F

## 2018-01-01 VITALS
WEIGHT: 125 LBS | DIASTOLIC BLOOD PRESSURE: 55 MMHG | RESPIRATION RATE: 16 BRPM | BODY MASS INDEX: 20.83 KG/M2 | HEIGHT: 65 IN | HEART RATE: 72 BPM | SYSTOLIC BLOOD PRESSURE: 126 MMHG | OXYGEN SATURATION: 95 % | TEMPERATURE: 97 F

## 2018-01-01 VITALS
WEIGHT: 124.3 LBS | SYSTOLIC BLOOD PRESSURE: 120 MMHG | DIASTOLIC BLOOD PRESSURE: 60 MMHG | HEIGHT: 65 IN | BODY MASS INDEX: 20.71 KG/M2

## 2018-01-01 VITALS
SYSTOLIC BLOOD PRESSURE: 153 MMHG | OXYGEN SATURATION: 99 % | DIASTOLIC BLOOD PRESSURE: 55 MMHG | BODY MASS INDEX: 20.66 KG/M2 | WEIGHT: 124 LBS | TEMPERATURE: 97.6 F | HEART RATE: 69 BPM | RESPIRATION RATE: 18 BRPM | HEIGHT: 65 IN

## 2018-01-01 VITALS
HEIGHT: 65 IN | DIASTOLIC BLOOD PRESSURE: 53 MMHG | TEMPERATURE: 98 F | OXYGEN SATURATION: 95 % | WEIGHT: 124 LBS | HEART RATE: 64 BPM | BODY MASS INDEX: 20.66 KG/M2 | SYSTOLIC BLOOD PRESSURE: 134 MMHG | RESPIRATION RATE: 18 BRPM

## 2018-01-01 DIAGNOSIS — R05.9 COUGH: ICD-10-CM

## 2018-01-01 DIAGNOSIS — R91.8 ABNORMAL X-RAY OF LUNG: Primary | ICD-10-CM

## 2018-01-01 DIAGNOSIS — C34.91 STAGE III SQUAMOUS CELL CARCINOMA OF RIGHT LUNG (HCC): Primary | ICD-10-CM

## 2018-01-01 DIAGNOSIS — Z71.9 ENCOUNTER FOR CONSULTATION: ICD-10-CM

## 2018-01-01 DIAGNOSIS — C34.91 STAGE III SQUAMOUS CELL CARCINOMA OF RIGHT LUNG (HCC): ICD-10-CM

## 2018-01-01 DIAGNOSIS — Z92.3 HX OF RADIATION THERAPY: ICD-10-CM

## 2018-01-01 DIAGNOSIS — C34.90 NON-SMALL CELL CARCINOMA OF LUNG (HCC): Primary | ICD-10-CM

## 2018-01-01 DIAGNOSIS — J44.9 CHRONIC OBSTRUCTIVE PULMONARY DISEASE, UNSPECIFIED COPD TYPE (HCC): ICD-10-CM

## 2018-01-01 DIAGNOSIS — R91.8 ABNORMAL X-RAY OF LUNG: ICD-10-CM

## 2018-01-01 DIAGNOSIS — Z85.118 HISTORY OF LUNG CANCER: Primary | ICD-10-CM

## 2018-01-01 DIAGNOSIS — C34.11 MALIGNANT NEOPLASM OF UPPER LOBE OF RIGHT LUNG (HCC): Primary | ICD-10-CM

## 2018-01-01 DIAGNOSIS — R09.89 PULMONARY CONGESTION: Primary | ICD-10-CM

## 2018-01-01 DIAGNOSIS — C34.00 MALIGNANT NEOPLASM OF HILUS OF LUNG, UNSPECIFIED LATERALITY (HCC): Primary | ICD-10-CM

## 2018-01-01 DIAGNOSIS — C34.11 MALIGNANT NEOPLASM OF UPPER LOBE OF RIGHT LUNG (HCC): ICD-10-CM

## 2018-01-01 DIAGNOSIS — Z71.2 ENCOUNTER TO DISCUSS X-RAY RESULTS: ICD-10-CM

## 2018-01-01 DIAGNOSIS — R09.89 PULMONARY CONGESTION: ICD-10-CM

## 2018-01-01 DIAGNOSIS — Z78.9 NONSMOKER: ICD-10-CM

## 2018-01-01 LAB
ALBUMIN SERPL-MCNC: 3.9 G/DL (ref 3.5–5)
ALBUMIN/GLOB SERPL: 1.3 G/DL (ref 1.1–2.5)
ALP SERPL-CCNC: 57 U/L (ref 24–120)
ALT SERPL W P-5'-P-CCNC: 21 U/L (ref 0–54)
ANION GAP SERPL CALCULATED.3IONS-SCNC: 12 MMOL/L (ref 4–13)
AST SERPL-CCNC: 29 U/L (ref 7–45)
BILIRUB SERPL-MCNC: 0.3 MG/DL (ref 0.1–1)
BUN BLD-MCNC: 13 MG/DL (ref 5–21)
BUN/CREAT SERPL: 14.9 (ref 7–25)
CALCIUM SPEC-SCNC: 9.4 MG/DL (ref 8.4–10.4)
CHLORIDE SERPL-SCNC: 100 MMOL/L (ref 98–110)
CO2 SERPL-SCNC: 29 MMOL/L (ref 24–31)
CREAT BLD-MCNC: 0.87 MG/DL (ref 0.5–1.4)
CREAT BLDA-MCNC: 0.9 MG/DL (ref 0.6–1.3)
CREAT BLDA-MCNC: 1 MG/DL (ref 0.6–1.3)
CREAT SERPL-MCNC: 0.9 MG/DL
CREAT SERPL-MCNC: 0.91 MG/DL
CYTO UR: NORMAL
GFR SERPL CREATININE-BSD FRML MDRD: 64 ML/MIN/1.73
GLOBULIN UR ELPH-MCNC: 3.1 GM/DL
GLUCOSE BLD-MCNC: 87 MG/DL (ref 70–100)
LAB AP CASE REPORT: NORMAL
Lab: NORMAL
Lab: NORMAL
PATH REPORT.FINAL DX SPEC: NORMAL
PATH REPORT.GROSS SPEC: NORMAL
POTASSIUM BLD-SCNC: 3.7 MMOL/L (ref 3.5–5.3)
PROT SERPL-MCNC: 7 G/DL (ref 6.3–8.7)
SODIUM BLD-SCNC: 141 MMOL/L (ref 135–145)

## 2018-01-01 PROCEDURE — 77300 RADIATION THERAPY DOSE PLAN: CPT | Performed by: RADIOLOGY

## 2018-01-01 PROCEDURE — 96374 THER/PROPH/DIAG INJ IV PUSH: CPT

## 2018-01-01 PROCEDURE — 25010000002 DEXAMETHASONE PER 1 MG: Performed by: NURSE PRACTITIONER

## 2018-01-01 PROCEDURE — 25010000002 PACLITAXEL PROTEIN-BOUND PART PER 1 MG: Performed by: NURSE PRACTITIONER

## 2018-01-01 PROCEDURE — 96365 THER/PROPH/DIAG IV INF INIT: CPT

## 2018-01-01 PROCEDURE — 96375 TX/PRO/DX INJ NEW DRUG ADDON: CPT

## 2018-01-01 PROCEDURE — 77417 THER RADIOLOGY PORT IMAGE(S): CPT | Performed by: RADIOLOGY

## 2018-01-01 PROCEDURE — 77470 SPECIAL RADIATION TREATMENT: CPT | Performed by: RADIOLOGY

## 2018-01-01 PROCEDURE — 77336 RADIATION PHYSICS CONSULT: CPT | Performed by: RADIOLOGY

## 2018-01-01 PROCEDURE — 25010000002 HEPARIN FLUSH (PORCINE) 100 UNIT/ML SOLUTION: Performed by: INTERNAL MEDICINE

## 2018-01-01 PROCEDURE — 77334 RADIATION TREATMENT AID(S): CPT | Performed by: RADIOLOGY

## 2018-01-01 PROCEDURE — 77295 3-D RADIOTHERAPY PLAN: CPT | Performed by: RADIOLOGY

## 2018-01-01 PROCEDURE — 25010000002 PALONOSETRON PER 25 MCG: Performed by: INTERNAL MEDICINE

## 2018-01-01 PROCEDURE — 77412 RADIATION TX DELIVERY LVL 3: CPT | Performed by: RADIOLOGY

## 2018-01-01 PROCEDURE — 96413 CHEMO IV INFUSION 1 HR: CPT

## 2018-01-01 PROCEDURE — 96367 TX/PROPH/DG ADDL SEQ IV INF: CPT

## 2018-01-01 PROCEDURE — 96417 CHEMO IV INFUS EACH ADDL SEQ: CPT

## 2018-01-01 PROCEDURE — 71046 X-RAY EXAM CHEST 2 VIEWS: CPT

## 2018-01-01 PROCEDURE — 71260 CT THORAX DX C+: CPT

## 2018-01-01 PROCEDURE — 25010000002 IOPAMIDOL 61 % SOLUTION: Performed by: PHYSICIAN ASSISTANT

## 2018-01-01 PROCEDURE — 25010000002 CARBOPLATIN PER 50 MG: Performed by: NURSE PRACTITIONER

## 2018-01-01 PROCEDURE — 25010000002 DEXAMETHASONE PER 1 MG: Performed by: PHYSICIAN ASSISTANT

## 2018-01-01 PROCEDURE — 25010000002 PALONOSETRON PER 25 MCG: Performed by: NURSE PRACTITIONER

## 2018-01-01 PROCEDURE — 25010000002 PALONOSETRON PER 25 MCG: Performed by: PHYSICIAN ASSISTANT

## 2018-01-01 PROCEDURE — 71270 CT THORAX DX C-/C+: CPT

## 2018-01-01 PROCEDURE — 77290 THER RAD SIMULAJ FIELD CPLX: CPT | Performed by: RADIOLOGY

## 2018-01-01 PROCEDURE — 36415 COLL VENOUS BLD VENIPUNCTURE: CPT

## 2018-01-01 PROCEDURE — 80053 COMPREHEN METABOLIC PANEL: CPT

## 2018-01-01 PROCEDURE — 82565 ASSAY OF CREATININE: CPT

## 2018-01-01 PROCEDURE — 0 IOPAMIDOL 61 % SOLUTION: Performed by: INTERNAL MEDICINE

## 2018-01-01 PROCEDURE — 25010000002 PACLITAXEL PROTEIN-BOUND PART PER 1 MG: Performed by: PHYSICIAN ASSISTANT

## 2018-01-01 RX ORDER — DIPHENHYDRAMINE HYDROCHLORIDE 50 MG/ML
50 INJECTION INTRAMUSCULAR; INTRAVENOUS AS NEEDED
Status: DISCONTINUED | OUTPATIENT
Start: 2018-01-01 | End: 2018-01-01 | Stop reason: HOSPADM

## 2018-01-01 RX ORDER — PACLITAXEL 100 MG/20ML
145 INJECTION, POWDER, LYOPHILIZED, FOR SUSPENSION INTRAVENOUS ONCE
Status: CANCELLED | OUTPATIENT
Start: 2018-01-01

## 2018-01-01 RX ORDER — SODIUM CHLORIDE 0.9 % (FLUSH) 0.9 %
10 SYRINGE (ML) INJECTION AS NEEDED
Status: DISCONTINUED | OUTPATIENT
Start: 2018-01-01 | End: 2018-01-01 | Stop reason: HOSPADM

## 2018-01-01 RX ORDER — PALONOSETRON 0.05 MG/ML
0.25 INJECTION, SOLUTION INTRAVENOUS ONCE
Status: COMPLETED | OUTPATIENT
Start: 2018-01-01 | End: 2018-01-01

## 2018-01-01 RX ORDER — SODIUM CHLORIDE 9 MG/ML
20 INJECTION, SOLUTION INTRAVENOUS ONCE
Status: COMPLETED | OUTPATIENT
Start: 2018-01-01 | End: 2018-01-01

## 2018-01-01 RX ORDER — DIPHENHYDRAMINE HYDROCHLORIDE 50 MG/ML
50 INJECTION INTRAMUSCULAR; INTRAVENOUS AS NEEDED
Status: CANCELLED
Start: 2018-01-01

## 2018-01-01 RX ORDER — METHYLPREDNISOLONE SODIUM SUCCINATE 125 MG/2ML
125 INJECTION, POWDER, LYOPHILIZED, FOR SOLUTION INTRAMUSCULAR; INTRAVENOUS AS NEEDED
Status: DISCONTINUED | OUTPATIENT
Start: 2018-01-01 | End: 2018-01-01 | Stop reason: HOSPADM

## 2018-01-01 RX ORDER — PACLITAXEL 100 MG/20ML
145 INJECTION, POWDER, LYOPHILIZED, FOR SUSPENSION INTRAVENOUS ONCE
Status: COMPLETED | OUTPATIENT
Start: 2018-01-01 | End: 2018-01-01

## 2018-01-01 RX ORDER — MEGESTROL ACETATE 40 MG/ML
SUSPENSION ORAL DAILY
COMMUNITY

## 2018-01-01 RX ORDER — PALONOSETRON 0.05 MG/ML
0.25 INJECTION, SOLUTION INTRAVENOUS ONCE
Status: CANCELLED | OUTPATIENT
Start: 2018-01-01

## 2018-01-01 RX ORDER — METHYLPREDNISOLONE SODIUM SUCCINATE 125 MG/2ML
125 INJECTION, POWDER, LYOPHILIZED, FOR SOLUTION INTRAMUSCULAR; INTRAVENOUS AS NEEDED
Status: CANCELLED | OUTPATIENT
Start: 2018-01-01

## 2018-01-01 RX ORDER — SODIUM CHLORIDE 0.9 % (FLUSH) 0.9 %
10 SYRINGE (ML) INJECTION AS NEEDED
Status: CANCELLED | OUTPATIENT
Start: 2018-01-01

## 2018-01-01 RX ORDER — METHYLPREDNISOLONE SODIUM SUCCINATE 125 MG/2ML
125 INJECTION, POWDER, LYOPHILIZED, FOR SOLUTION INTRAMUSCULAR; INTRAVENOUS AS NEEDED
Status: CANCELLED
Start: 2018-01-01

## 2018-01-01 RX ORDER — DIPHENHYDRAMINE HYDROCHLORIDE 50 MG/ML
50 INJECTION INTRAMUSCULAR; INTRAVENOUS AS NEEDED
Status: CANCELLED | OUTPATIENT
Start: 2018-01-01

## 2018-01-01 RX ORDER — AMOXICILLIN AND CLAVULANATE POTASSIUM 875; 125 MG/1; MG/1
1 TABLET, FILM COATED ORAL EVERY 12 HOURS SCHEDULED
Qty: 20 TABLET | Refills: 0 | Status: SHIPPED | OUTPATIENT
Start: 2018-01-01

## 2018-01-01 RX ORDER — SODIUM CHLORIDE 9 MG/ML
20 INJECTION, SOLUTION INTRAVENOUS ONCE
Status: CANCELLED
Start: 2018-01-01

## 2018-01-01 RX ORDER — SODIUM CHLORIDE 9 MG/ML
20 INJECTION, SOLUTION INTRAVENOUS ONCE
Status: CANCELLED | OUTPATIENT
Start: 2018-01-01

## 2018-01-01 RX ORDER — GUAIFENESIN 200 MG/1
400 TABLET ORAL 3 TIMES DAILY
Qty: 120 TABLET | Refills: 3 | Status: SHIPPED | OUTPATIENT
Start: 2018-01-01 | End: 2018-07-07

## 2018-01-01 RX ORDER — IPRATROPIUM BROMIDE AND ALBUTEROL SULFATE 2.5; .5 MG/3ML; MG/3ML
3 SOLUTION RESPIRATORY (INHALATION)
Status: CANCELLED | OUTPATIENT
Start: 2018-01-01 | End: 2018-01-01

## 2018-01-01 RX ORDER — HEPARIN SODIUM (PORCINE) LOCK FLUSH IV SOLN 100 UNIT/ML 100 UNIT/ML
500 SOLUTION INTRAVENOUS AS NEEDED
Status: CANCELLED | OUTPATIENT
Start: 2018-01-01

## 2018-01-01 RX ADMIN — DEXAMETHASONE SODIUM PHOSPHATE 12 MG: 4 INJECTION, SOLUTION INTRAMUSCULAR; INTRAVENOUS at 09:27

## 2018-01-01 RX ADMIN — PACLITAXEL 145 MG: 100 INJECTION, POWDER, LYOPHILIZED, FOR SUSPENSION INTRAVENOUS at 09:56

## 2018-01-01 RX ADMIN — PALONOSETRON HYDROCHLORIDE 0.25 MG: 0.25 INJECTION INTRAVENOUS at 09:25

## 2018-01-01 RX ADMIN — Medication 500 UNITS: at 10:48

## 2018-01-01 RX ADMIN — SODIUM CHLORIDE 20 ML/HR: 9 INJECTION, SOLUTION INTRAVENOUS at 10:03

## 2018-01-01 RX ADMIN — SODIUM CHLORIDE 20 ML/HR: 9 INJECTION, SOLUTION INTRAVENOUS at 09:43

## 2018-01-01 RX ADMIN — PALONOSETRON HYDROCHLORIDE 0.25 MG: 0.25 INJECTION INTRAVENOUS at 09:51

## 2018-01-01 RX ADMIN — PACLITAXEL 145 MG: 100 INJECTION, POWDER, LYOPHILIZED, FOR SUSPENSION INTRAVENOUS at 10:03

## 2018-01-01 RX ADMIN — DEXAMETHASONE SODIUM PHOSPHATE 12 MG: 4 INJECTION, SOLUTION INTRAMUSCULAR; INTRAVENOUS at 10:05

## 2018-01-01 RX ADMIN — DEXAMETHASONE SODIUM PHOSPHATE 12 MG: 4 INJECTION, SOLUTION INTRAMUSCULAR; INTRAVENOUS at 09:44

## 2018-01-01 RX ADMIN — Medication 500 UNITS: at 11:18

## 2018-01-01 RX ADMIN — Medication 10 ML: at 12:34

## 2018-01-01 RX ADMIN — DEXAMETHASONE SODIUM PHOSPHATE 12 MG: 4 INJECTION, SOLUTION INTRAMUSCULAR; INTRAVENOUS at 09:26

## 2018-01-01 RX ADMIN — Medication 10 ML: at 12:03

## 2018-01-01 RX ADMIN — Medication 500 UNITS: at 12:19

## 2018-01-01 RX ADMIN — PALONOSETRON HYDROCHLORIDE 0.25 MG: 0.25 INJECTION INTRAVENOUS at 10:46

## 2018-01-01 RX ADMIN — Medication 500 UNITS: at 11:15

## 2018-01-01 RX ADMIN — PACLITAXEL 145 MG: 100 INJECTION, POWDER, LYOPHILIZED, FOR SUSPENSION INTRAVENOUS at 11:12

## 2018-01-01 RX ADMIN — Medication 10 ML: at 12:18

## 2018-01-01 RX ADMIN — DEXAMETHASONE SODIUM PHOSPHATE 12 MG: 4 INJECTION, SOLUTION INTRAMUSCULAR; INTRAVENOUS at 09:41

## 2018-01-01 RX ADMIN — IOPAMIDOL 75 ML: 612 INJECTION, SOLUTION INTRAVENOUS at 13:30

## 2018-01-01 RX ADMIN — DEXAMETHASONE SODIUM PHOSPHATE 12 MG: 4 INJECTION, SOLUTION INTRAMUSCULAR; INTRAVENOUS at 10:26

## 2018-01-01 RX ADMIN — Medication 10 ML: at 10:47

## 2018-01-01 RX ADMIN — DEXAMETHASONE SODIUM PHOSPHATE 12 MG: 4 INJECTION, SOLUTION INTRAMUSCULAR; INTRAVENOUS at 08:53

## 2018-01-01 RX ADMIN — Medication 500 UNITS: at 10:55

## 2018-01-01 RX ADMIN — SODIUM CHLORIDE 250 ML: 9 INJECTION, SOLUTION INTRAVENOUS at 09:25

## 2018-01-01 RX ADMIN — DEXAMETHASONE SODIUM PHOSPHATE 12 MG: 4 INJECTION, SOLUTION INTRAMUSCULAR; INTRAVENOUS at 10:22

## 2018-01-01 RX ADMIN — Medication 10 ML: at 11:03

## 2018-01-01 RX ADMIN — Medication 10 ML: at 10:55

## 2018-01-01 RX ADMIN — PACLITAXEL 145 MG: 100 INJECTION, POWDER, LYOPHILIZED, FOR SUSPENSION INTRAVENOUS at 09:32

## 2018-01-01 RX ADMIN — SODIUM CHLORIDE 250 ML: 9 INJECTION, SOLUTION INTRAVENOUS at 09:35

## 2018-01-01 RX ADMIN — PALONOSETRON HYDROCHLORIDE 0.25 MG: 0.25 INJECTION INTRAVENOUS at 09:31

## 2018-01-01 RX ADMIN — PACLITAXEL 145 MG: 100 INJECTION, POWDER, LYOPHILIZED, FOR SUSPENSION INTRAVENOUS at 10:15

## 2018-01-01 RX ADMIN — Medication 500 UNITS: at 12:34

## 2018-01-01 RX ADMIN — DEXAMETHASONE SODIUM PHOSPHATE 12 MG: 4 INJECTION, SOLUTION INTRAMUSCULAR; INTRAVENOUS at 09:53

## 2018-01-01 RX ADMIN — CARBOPLATIN 260 MG: 10 INJECTION, SOLUTION INTRAVENOUS at 10:12

## 2018-01-01 RX ADMIN — SODIUM CHLORIDE 20 ML/HR: 9 INJECTION, SOLUTION INTRAVENOUS at 08:53

## 2018-01-01 RX ADMIN — Medication 10 ML: at 11:47

## 2018-01-01 RX ADMIN — SODIUM CHLORIDE 20 ML/HR: 9 INJECTION, SOLUTION INTRAVENOUS at 10:15

## 2018-01-01 RX ADMIN — DEXAMETHASONE SODIUM PHOSPHATE 12 MG: 4 INJECTION, SOLUTION INTRAMUSCULAR; INTRAVENOUS at 10:43

## 2018-01-01 RX ADMIN — Medication 500 UNITS: at 11:49

## 2018-01-01 RX ADMIN — Medication 10 ML: at 11:15

## 2018-01-01 RX ADMIN — PACLITAXEL 145 MG: 100 INJECTION, POWDER, LYOPHILIZED, FOR SUSPENSION INTRAVENOUS at 10:59

## 2018-01-01 RX ADMIN — Medication 500 UNITS: at 12:04

## 2018-01-01 RX ADMIN — IOPAMIDOL 100 ML: 612 INJECTION, SOLUTION INTRAVENOUS at 10:30

## 2018-01-01 RX ADMIN — SODIUM CHLORIDE 20 ML/HR: 9 INJECTION, SOLUTION INTRAVENOUS at 10:26

## 2018-01-01 RX ADMIN — PALONOSETRON HYDROCHLORIDE 0.25 MG: 0.25 INJECTION INTRAVENOUS at 10:09

## 2018-01-01 RX ADMIN — CARBOPLATIN 260 MG: 10 INJECTION, SOLUTION INTRAVENOUS at 11:52

## 2018-01-01 RX ADMIN — Medication 500 UNITS: at 10:47

## 2018-01-01 RX ADMIN — Medication 10 ML: at 11:18

## 2018-01-01 RX ADMIN — PACLITAXEL 145 MG: 100 INJECTION, POWDER, LYOPHILIZED, FOR SUSPENSION INTRAVENOUS at 10:28

## 2018-01-01 RX ADMIN — SODIUM CHLORIDE 20 ML/HR: 9 INJECTION, SOLUTION INTRAVENOUS at 09:36

## 2018-01-01 RX ADMIN — PALONOSETRON HYDROCHLORIDE 0.25 MG: 0.25 INJECTION INTRAVENOUS at 10:25

## 2018-01-01 RX ADMIN — Medication 10 ML: at 10:48

## 2018-01-01 RX ADMIN — CARBOPLATIN 260 MG: 10 INJECTION, SOLUTION INTRAVENOUS at 11:15

## 2018-01-01 RX ADMIN — PACLITAXEL 145 MG: 100 INJECTION, POWDER, LYOPHILIZED, FOR SUSPENSION INTRAVENOUS at 10:44

## 2018-01-01 RX ADMIN — PACLITAXEL 145 MG: 100 INJECTION, POWDER, LYOPHILIZED, FOR SUSPENSION INTRAVENOUS at 10:30

## 2018-01-01 RX ADMIN — Medication 500 UNITS: at 11:03

## 2018-01-01 RX ADMIN — PALONOSETRON HYDROCHLORIDE 0.25 MG: 0.25 INJECTION INTRAVENOUS at 09:41

## 2018-01-01 RX ADMIN — SODIUM CHLORIDE 20 ML/HR: 9 INJECTION, SOLUTION INTRAVENOUS at 10:22

## 2018-01-01 RX ADMIN — PACLITAXEL 145 MG: 100 INJECTION, POWDER, LYOPHILIZED, FOR SUSPENSION INTRAVENOUS at 10:05

## 2018-02-21 NOTE — PROGRESS NOTES
Met with patient following first radiation treatment.   She reports that she is being treated for breast cancer and has 30 more tx.  She currently has her son or daughter - in -law bring her.  She is currently receiving chemotherapy as well and has some side effects today as a result (red face).  She reports that her biggest stressor at the moment is worrying how she will pay for her medical care.  She currently [place] and medicaid which I affirmed should cover most of costs but also discussed financial assistance program hospital has.   Patient reports that she feels like she is coping well and has a good support system.  No other needs noted at this time.  Information/education was given to patient on this date.

## 2018-02-27 PROBLEM — Z71.9 ENCOUNTER FOR CONSULTATION: Status: ACTIVE | Noted: 2018-01-01

## 2018-02-27 PROBLEM — J44.9 COPD (CHRONIC OBSTRUCTIVE PULMONARY DISEASE) (HCC): Status: ACTIVE | Noted: 2018-01-01

## 2018-02-27 PROBLEM — C34.91: Status: ACTIVE | Noted: 2017-01-01

## 2018-04-03 NOTE — PROGRESS NOTES
Call placed into [facility] per Dr. Oh. Patient c/o of soreness and rawness to  tongue.  Will call in a prescription.  Drea Pulido RN

## 2018-04-16 PROBLEM — R09.89 PULMONARY CONGESTION: Status: ACTIVE | Noted: 2018-01-01

## 2018-04-16 PROBLEM — R05.9 COUGH: Status: ACTIVE | Noted: 2018-01-01

## 2018-04-16 NOTE — PROGRESS NOTES
Patient seen at Dr. Oh's office.  She has an open painful area to right posterior chest.  Area surrounded by dry desquamation.  Open area is dressed with Opticell AG+ Would dressing to help absorb.  Instructed patient to let us know if it becomes damp and we will see her again.  Dressing may stay on for 7 days.  Patient verbalized understanding.

## 2018-04-18 PROBLEM — R91.8 ABNORMAL X-RAY OF LUNG: Status: ACTIVE | Noted: 2018-01-01

## 2018-04-18 NOTE — PROGRESS NOTES
[facility]  MD Lubna Narayan, FREDERICKN, PA-C  ___________________________________________________________  [facility]  Department of Radiation Oncology  [address] [place] [id number]  Office:  [phone]  Fax: [phone]        .4/18/2018  Abnormal CXR- pt notified. CT scan ordered.    Orders Placed This Encounter   Procedures   • CT Chest With & Without Contrast     Standing Status:   Future     Standing Expiration Date:   4/18/2019     Scheduling Instructions:      Please schedule today

## 2018-04-19 ENCOUNTER — HOSPITAL ENCOUNTER (INPATIENT)
Dept: HOSPITAL 58 - MEDSURG B | Age: 73
LOS: 4 days | Discharge: HOME | DRG: 194 | End: 2018-04-23
Attending: INTERNAL MEDICINE | Admitting: INTERNAL MEDICINE
Payer: COMMERCIAL

## 2018-04-19 VITALS — BODY MASS INDEX: 20.5 KG/M2

## 2018-04-19 DIAGNOSIS — M47.9: ICD-10-CM

## 2018-04-19 DIAGNOSIS — Z99.81: ICD-10-CM

## 2018-04-19 DIAGNOSIS — R06.00: ICD-10-CM

## 2018-04-19 DIAGNOSIS — K59.00: ICD-10-CM

## 2018-04-19 DIAGNOSIS — Z85.41: ICD-10-CM

## 2018-04-19 DIAGNOSIS — E86.0: ICD-10-CM

## 2018-04-19 DIAGNOSIS — E78.5: ICD-10-CM

## 2018-04-19 DIAGNOSIS — I71.4: ICD-10-CM

## 2018-04-19 DIAGNOSIS — K57.90: ICD-10-CM

## 2018-04-19 DIAGNOSIS — J44.9: ICD-10-CM

## 2018-04-19 DIAGNOSIS — Z79.899: ICD-10-CM

## 2018-04-19 DIAGNOSIS — F41.8: ICD-10-CM

## 2018-04-19 DIAGNOSIS — M19.90: ICD-10-CM

## 2018-04-19 DIAGNOSIS — D64.9: ICD-10-CM

## 2018-04-19 DIAGNOSIS — Z79.01: ICD-10-CM

## 2018-04-19 DIAGNOSIS — J18.1: Primary | ICD-10-CM

## 2018-04-19 DIAGNOSIS — C34.91: ICD-10-CM

## 2018-04-19 DIAGNOSIS — F17.210: ICD-10-CM

## 2018-04-19 DIAGNOSIS — I10: ICD-10-CM

## 2018-04-19 PROCEDURE — 85008 BL SMEAR W/O DIFF WBC COUNT: CPT

## 2018-04-19 PROCEDURE — 85018 HEMOGLOBIN: CPT

## 2018-04-19 PROCEDURE — 87086 URINE CULTURE/COLONY COUNT: CPT

## 2018-04-19 PROCEDURE — 80053 COMPREHEN METABOLIC PANEL: CPT

## 2018-04-19 PROCEDURE — 93010 ELECTROCARDIOGRAM REPORT: CPT

## 2018-04-19 PROCEDURE — 86900 BLOOD TYPING SEROLOGIC ABO: CPT

## 2018-04-19 PROCEDURE — 85025 COMPLETE CBC W/AUTO DIFF WBC: CPT

## 2018-04-19 PROCEDURE — 86922 COMPATIBILITY TEST ANTIGLOB: CPT

## 2018-04-19 PROCEDURE — 86850 RBC ANTIBODY SCREEN: CPT

## 2018-04-19 PROCEDURE — 87186 SC STD MICRODIL/AGAR DIL: CPT

## 2018-04-19 PROCEDURE — 87502 INFLUENZA DNA AMP PROBE: CPT

## 2018-04-19 PROCEDURE — 85014 HEMATOCRIT: CPT

## 2018-04-19 PROCEDURE — 93005 ELECTROCARDIOGRAM TRACING: CPT

## 2018-04-19 PROCEDURE — 87070 CULTURE OTHR SPECIMN AEROBIC: CPT

## 2018-04-19 PROCEDURE — 82803 BLOOD GASES ANY COMBINATION: CPT

## 2018-04-19 PROCEDURE — 81001 URINALYSIS AUTO W/SCOPE: CPT

## 2018-04-19 PROCEDURE — 94640 AIRWAY INHALATION TREATMENT: CPT

## 2018-04-19 PROCEDURE — 36415 COLL VENOUS BLD VENIPUNCTURE: CPT

## 2018-04-19 PROCEDURE — 97802 MEDICAL NUTRITION INDIV IN: CPT

## 2018-04-19 PROCEDURE — 36430 TRANSFUSION BLD/BLD COMPNT: CPT

## 2018-04-19 RX ADMIN — FERROUS SULFATE TAB EC 324 MG (65 MG FE EQUIVALENT) SCH MG: 324 (65 FE) TABLET DELAYED RESPONSE at 21:23

## 2018-04-19 RX ADMIN — Medication SCH MG: at 21:22

## 2018-04-19 RX ADMIN — KETOROLAC TROMETHAMINE SCH MG: 30 INJECTION, SOLUTION INTRAMUSCULAR; INTRAVENOUS at 21:23

## 2018-04-19 RX ADMIN — CIMETIDINE SCH MG: 400 TABLET, FILM COATED ORAL at 21:28

## 2018-04-19 RX ADMIN — HYDROCORTISONE SODIUM SUCCINATE SCH MG: 250 INJECTION, POWDER, FOR SOLUTION INTRAMUSCULAR; INTRAVENOUS at 16:15

## 2018-04-19 RX ADMIN — LEVALBUTEROL HYDROCHLORIDE SCH VIAL: 1.25 SOLUTION RESPIRATORY (INHALATION) at 23:22

## 2018-04-19 RX ADMIN — TRAVOPROST SCH DROP: 0.04 SOLUTION/ DROPS OPHTHALMIC at 21:22

## 2018-04-19 RX ADMIN — GUAIFENESIN SCH MG: 600 TABLET, EXTENDED RELEASE ORAL at 21:23

## 2018-04-19 RX ADMIN — ONDANSETRON HYDROCHLORIDE SCH MG: 4 TABLET, FILM COATED ORAL at 21:23

## 2018-04-19 RX ADMIN — HYDROCODONE POLISTIREX AND CHLORPHENIRAMINE POLISTIREX PRN ML: 10; 8 SUSPENSION, EXTENDED RELEASE ORAL at 16:15

## 2018-04-19 RX ADMIN — PANTOPRAZOLE SODIUM SCH MG: 40 TABLET, DELAYED RELEASE ORAL at 18:15

## 2018-04-19 RX ADMIN — KETOROLAC TROMETHAMINE SCH MG: 30 INJECTION, SOLUTION INTRAMUSCULAR; INTRAVENOUS at 16:15

## 2018-04-19 RX ADMIN — SIMVASTATIN SCH MG: 40 TABLET, FILM COATED ORAL at 21:23

## 2018-04-19 RX ADMIN — CEFTRIAXONE SODIUM SCH MLS/HR: 1 INJECTION, POWDER, FOR SOLUTION INTRAMUSCULAR; INTRAVENOUS at 16:15

## 2018-04-19 RX ADMIN — AZITHROMYCIN DIHYDRATE SCH MG: 250 TABLET, FILM COATED ORAL at 16:16

## 2018-04-19 RX ADMIN — IPRATROPIUM BROMIDE AND ALBUTEROL SCH SPRAY: 20; 100 SPRAY, METERED RESPIRATORY (INHALATION) at 21:27

## 2018-04-19 RX ADMIN — DEXTROSE AND SODIUM CHLORIDE SCH MLS/HR: 5; .45 INJECTION, SOLUTION INTRAVENOUS at 16:07

## 2018-04-19 RX ADMIN — LEVALBUTEROL HYDROCHLORIDE SCH VIAL: 1.25 SOLUTION RESPIRATORY (INHALATION) at 16:55

## 2018-04-19 RX ADMIN — IPRATROPIUM BROMIDE AND ALBUTEROL SCH SPRAY: 20; 100 SPRAY, METERED RESPIRATORY (INHALATION) at 18:16

## 2018-04-19 RX ADMIN — HYDROCORTISONE SODIUM SUCCINATE SCH MG: 250 INJECTION, POWDER, FOR SOLUTION INTRAMUSCULAR; INTRAVENOUS at 21:23

## 2018-04-19 NOTE — DI
EXAM:  CHEST FRONTAL VIEW 

  

HISTORY:  Pneumonitis. 

COMPARISON:  02/24/2017 

  

FINDINGS:  Heart size remains within normal limits.  Moderately severe atherosclerosis.  Interval sherri
cement of a left port catheter ending over the superior vena cava.  Increased right upper lobe thick-
walled cavitary appearing lesion since previous exam.  Lungs are otherwise grossly clear.  No pneumot
horax or pleural fluid. 

  

  

IMPRESSION: 

Worsening thick-walled cavitary appearing lesion of the right upper lobe suggesting a pneumonia/absce
ss.

## 2018-04-20 PROCEDURE — 30243N1 TRANSFUSION OF NONAUTOLOGOUS RED BLOOD CELLS INTO CENTRAL VEIN, PERCUTANEOUS APPROACH: ICD-10-PCS | Performed by: INTERNAL MEDICINE

## 2018-04-20 RX ADMIN — IPRATROPIUM BROMIDE AND ALBUTEROL SCH SPRAY: 20; 100 SPRAY, METERED RESPIRATORY (INHALATION) at 16:33

## 2018-04-20 RX ADMIN — ALPRAZOLAM PRN MG: 0.5 TABLET ORAL at 19:47

## 2018-04-20 RX ADMIN — CIMETIDINE SCH MG: 400 TABLET, FILM COATED ORAL at 16:32

## 2018-04-20 RX ADMIN — VANCOMYCIN HYDROCHLORIDE SCH MLS/HR: 1 INJECTION, POWDER, LYOPHILIZED, FOR SOLUTION INTRAVENOUS at 10:44

## 2018-04-20 RX ADMIN — HYDROCORTISONE SODIUM SUCCINATE SCH MG: 250 INJECTION, POWDER, FOR SOLUTION INTRAMUSCULAR; INTRAVENOUS at 05:43

## 2018-04-20 RX ADMIN — CEFTRIAXONE SODIUM SCH MLS/HR: 1 INJECTION, POWDER, FOR SOLUTION INTRAMUSCULAR; INTRAVENOUS at 08:21

## 2018-04-20 RX ADMIN — KETOROLAC TROMETHAMINE SCH MG: 30 INJECTION, SOLUTION INTRAMUSCULAR; INTRAVENOUS at 05:44

## 2018-04-20 RX ADMIN — HYDROCODONE BITARTRATE AND ACETAMINOPHEN PRN TAB: 7.5; 325 TABLET ORAL at 20:52

## 2018-04-20 RX ADMIN — HYDROCODONE POLISTIREX AND CHLORPHENIRAMINE POLISTIREX PRN ML: 10; 8 SUSPENSION, EXTENDED RELEASE ORAL at 20:58

## 2018-04-20 RX ADMIN — HYDROCORTISONE SODIUM SUCCINATE SCH MG: 250 INJECTION, POWDER, FOR SOLUTION INTRAMUSCULAR; INTRAVENOUS at 23:04

## 2018-04-20 RX ADMIN — LEVALBUTEROL HYDROCHLORIDE SCH VIAL: 1.25 SOLUTION RESPIRATORY (INHALATION) at 17:57

## 2018-04-20 RX ADMIN — ONDANSETRON HYDROCHLORIDE SCH MG: 4 TABLET, FILM COATED ORAL at 16:33

## 2018-04-20 RX ADMIN — FENOFIBRATE SCH MG: 160 TABLET, FILM COATED ORAL at 08:22

## 2018-04-20 RX ADMIN — TRAVOPROST SCH DROP: 0.04 SOLUTION/ DROPS OPHTHALMIC at 20:50

## 2018-04-20 RX ADMIN — FERROUS SULFATE TAB EC 324 MG (65 MG FE EQUIVALENT) SCH MG: 324 (65 FE) TABLET DELAYED RESPONSE at 20:52

## 2018-04-20 RX ADMIN — IPRATROPIUM BROMIDE AND ALBUTEROL SCH SPRAY: 20; 100 SPRAY, METERED RESPIRATORY (INHALATION) at 12:17

## 2018-04-20 RX ADMIN — PANTOPRAZOLE SODIUM SCH MG: 40 TABLET, DELAYED RELEASE ORAL at 16:33

## 2018-04-20 RX ADMIN — ASPIRIN SCH MG: 81 TABLET, COATED ORAL at 08:22

## 2018-04-20 RX ADMIN — Medication SCH MG: at 16:33

## 2018-04-20 RX ADMIN — KETOROLAC TROMETHAMINE SCH MG: 30 INJECTION, SOLUTION INTRAMUSCULAR; INTRAVENOUS at 12:18

## 2018-04-20 RX ADMIN — IPRATROPIUM BROMIDE AND ALBUTEROL SCH SPRAY: 20; 100 SPRAY, METERED RESPIRATORY (INHALATION) at 20:56

## 2018-04-20 RX ADMIN — KETOROLAC TROMETHAMINE SCH MG: 30 INJECTION, SOLUTION INTRAMUSCULAR; INTRAVENOUS at 20:51

## 2018-04-20 RX ADMIN — LEVALBUTEROL HYDROCHLORIDE SCH VIAL: 1.25 SOLUTION RESPIRATORY (INHALATION) at 04:46

## 2018-04-20 RX ADMIN — CLOPIDOGREL SCH MG: 75 TABLET, FILM COATED ORAL at 08:21

## 2018-04-20 RX ADMIN — FERROUS SULFATE TAB EC 324 MG (65 MG FE EQUIVALENT) SCH MG: 324 (65 FE) TABLET DELAYED RESPONSE at 08:22

## 2018-04-20 RX ADMIN — DEXTROSE AND SODIUM CHLORIDE SCH MLS/HR: 5; .45 INJECTION, SOLUTION INTRAVENOUS at 05:45

## 2018-04-20 RX ADMIN — Medication SCH MG: at 20:51

## 2018-04-20 RX ADMIN — IPRATROPIUM BROMIDE AND ALBUTEROL SCH SPRAY: 20; 100 SPRAY, METERED RESPIRATORY (INHALATION) at 08:21

## 2018-04-20 RX ADMIN — SIMVASTATIN SCH MG: 40 TABLET, FILM COATED ORAL at 20:51

## 2018-04-20 RX ADMIN — ONDANSETRON HYDROCHLORIDE SCH MG: 4 TABLET, FILM COATED ORAL at 12:17

## 2018-04-20 RX ADMIN — LEVALBUTEROL HYDROCHLORIDE SCH VIAL: 1.25 SOLUTION RESPIRATORY (INHALATION) at 11:05

## 2018-04-20 RX ADMIN — CIMETIDINE SCH MG: 400 TABLET, FILM COATED ORAL at 05:44

## 2018-04-20 RX ADMIN — ONDANSETRON HYDROCHLORIDE SCH MG: 4 TABLET, FILM COATED ORAL at 08:22

## 2018-04-20 RX ADMIN — GUAIFENESIN SCH MG: 600 TABLET, EXTENDED RELEASE ORAL at 08:23

## 2018-04-20 RX ADMIN — ONDANSETRON HYDROCHLORIDE SCH MG: 4 TABLET, FILM COATED ORAL at 20:52

## 2018-04-20 RX ADMIN — AZITHROMYCIN DIHYDRATE SCH MG: 250 TABLET, FILM COATED ORAL at 08:23

## 2018-04-20 RX ADMIN — Medication SCH EACH: at 08:23

## 2018-04-20 RX ADMIN — HYDROCORTISONE SODIUM SUCCINATE SCH MG: 250 INJECTION, POWDER, FOR SOLUTION INTRAMUSCULAR; INTRAVENOUS at 16:34

## 2018-04-20 RX ADMIN — PANTOPRAZOLE SODIUM SCH MG: 40 TABLET, DELAYED RELEASE ORAL at 05:44

## 2018-04-20 RX ADMIN — GUAIFENESIN SCH MG: 600 TABLET, EXTENDED RELEASE ORAL at 20:52

## 2018-04-20 RX ADMIN — ESCITALOPRAM SCH MG: 10 TABLET, FILM COATED ORAL at 08:22

## 2018-04-20 RX ADMIN — Medication SCH MG: at 08:22

## 2018-04-20 NOTE — PCM.PROG
Attending Provider: 


ATTENDING PROVIDER:


Dr. ASHLEIGH VELOZ


This patient is seen with Tami Melendez, Nurse Practitioner.





DATE OF SERVICE:  04/20/18





SUBJECTIVE: 


This 72 year old WHITE/ F was hospitalized 04/19/18.  The patient is 

sitting up in bed, alert, resting comfortably. Cough is becoming more 

productive.  Hemoglobin is low today.  Will type and cross two units PRBCs.  

Last chemo treatment 3 weeks ago.  





REVIEW OF SYSTEMS:


CONSTITUTIONAL: Weakness. No night sweats. No  malaise, lethargy. No fever or 

chills.


HEENT: Eyes: No visual changes. No eye pain. No eye discharge. ENT: No runny 

nose. No epistaxis. No sinus pain. No odynophagia. No congestion.


RESPIRATORY: Cough and congestion. No hemoptysis.  Shortness of breath.


CARDIOVASCULAR: No angina symptoms. No CHF symptoms. No atypical chest pain for 

CAD. No palpitations. No orthopnea..


GASTROINTESTINAL: No abdominal pain. No nausea or vomiting. No diarrhea or 

constipation. No hematemesis. No hematochezia.


GENITOURINARY: No urgency. No frequency. No dysuria. No hematuria. No 

obstructive symptoms. No discharge. No pain. No significant abnormal bleeding.


MUSCULOSKELETAL: No musculoskeletal pain; no joint swelling. 


NEUROLOGICAL: Awake, alert, oriented to time, place and person. No headache. No 

neck pain. No syncope. No seizures. No dizziness.


PSYCHIATRIC: Not anxious. No depression. No suicidal thoughts. No homicidal 

thoughts.


SKIN:  No rash. No lesions. No wounds.


ENDOCRINE: No unexplained weight loss. No weight gain. 


HEMATOLOGIC/LYMPHATIC: No anemia. No purpura. No petechiae. No prolonged or 

excessive bleeding. No palpable lymph nodes.





PHYSICAL EXAMINATION:


GENERAL:  The patient is awake, alert and oriented, sitting in bed in no 

distress. 


VITAL SIGNS:  Temperature 98.6 F, Pulse 88, Respiratory Rate 20, BP 86/46, 

Pulse Ox 96%


HEENT:  Head normocephalic, atraumatic.  Eyes: Extraocular muscles are intact.  

Pupils are equal, round and reactive to light and accommodation.  Ears:  No 

lesions.  Nose appeared normal. Throat: No exudate or erythema.  The patient is 

pale. 


NECK: Supple. No JVD, no carotid bruit.  No lymphadenopathy or thyromegaly. 


LUNGS:  Bilateral rhonchi, severe.  Percussion note normal.  Chest symmetrical. 


HEART:  S1, S2, no S3. No murmurs.  No cyanosis or clubbing.  No ascites.  

Pulses: Dorsalis pedis and posterior tibial pulses +1 to +2 both sides. 


ABDOMEN:  Soft.  Non-tender.  Bowel sounds active. No CVA tenderness. No mass 

felt. 


EXTREMITIES:  No edema.  Full range of motion of all extremities, equal. 


NEUROLOGIC:  No focal deficit. Cranial nerves II through XII are grossly 

intact.  No headache, no double vision or headache.  


SKIN: Not dry.  Intact.  Turgor-normal. 


LYMPHATIC:  No palpable lymph nodes/no lymphedema. 


MUSCULOSKELETAL:  Normal joints with no swelling. Muscle tone is normal. 








LAB REVIEW:





 04/20/18 04:30 





 04/20/18 04:30 











04/20/18 04:30: Sodium 137, Potassium 4.4, Chloride 99, Carbon Dioxide 29, 

Anion Gap 13.4, BUN 24 H, Creatinine 1.07, Estimated GFR (MDRD) 50.00, BUN/

Creatinine Ratio 22.42, Glucose 184 H D, Calcium 8.9, Total Bilirubin 0.2, AST 

27, ALT 18, Alkaline Phosphatase 44 L, Total Protein 5.5 L, Albumin 1.7 L, 

Globulin 3.8, Albumin/Globulin Ratio 0.45


04/20/18 04:30: WBC 4.17 L, RBC 1.95 L, Hgb 6.7 L, Hct 22.1 L, .3 H, MCH 

34.4 H, MCHC 30.3 L, RDW Coeff of Federico 16.5 H, Plt Count 66 L, Immature Gran % (

Auto) 1.4, Neut % (Auto) 85.9, Lymph % (Auto) 7.0 L, Mono % (Auto) 5.5, Eos % (

Auto) 0.0, Baso % (Auto) 0.2, Immature Gran # (Auto) 0.1, Neut # (Auto) 3.6, 

Lymph # (Auto) 0.3 L, Mono # (Auto) 0.2 L, Eos # (Auto) 0.0, Baso # (Auto) 0.0, 

Anisocytosis Not present, Macrocytosis 1+


04/19/18 18:30: Urine Color Dark, Urine Clarity Clear, Urine pH 5.5, Ur 

Specific Gravity 1.020, Urine Protein 1+, Urine Glucose (UA) Negative, Urine 

Ketones Negative, Urine Blood Negative, Urine Nitrite Negative, Urine Bilirubin 

1+, Urine Urobilinogen 1.0, Ur Leukocyte Esterase Negative, Urine Microscopic 

RBC 0-2, Urine Microscopic WBC 5-10, Ur Squamous Epith Cells 2-5, Ur Transition 

Epith Cell 2-5, Urine Bacteria Trace, Urine Mucus Trace


04/19/18 17:50: Influ A Molecular Assay Negative by naat, Influ B Molecular 

Assay Negative by naat


04/19/18 16:30: Sodium 138, Potassium 3.8, Chloride 97 L, Carbon Dioxide 28, 

Anion Gap 16.8, BUN 15, Creatinine 0.75, Estimated GFR (MDRD) 76.00, BUN/

Creatinine Ratio 20.00, Glucose 116 H, Calcium 9.5, Total Bilirubin 0.7, AST 18

, ALT 10 L, Alkaline Phosphatase 49 L, Total Protein 6.2, Albumin 2.0 L, 

Globulin 4.2, Albumin/Globulin Ratio 0.48


04/19/18 16:26: WBC 5.23, RBC 2.17 L, Hgb 7.6 L, Hct 23.9 L, .1 H, MCH 

35.0 H, MCHC 31.8, RDW Coeff of Federico 16.2 H, Plt Count 69 L, Immature Gran % (

Auto) 1.7, Neut % (Auto) 78.6, Lymph % (Auto) 8.8 L, Mono % (Auto) 10.5 H, Eos 

% (Auto) 0.4, Baso % (Auto) 0.0, Immature Gran # (Auto) 0.1, Neut # (Auto) 4.1, 

Lymph # (Auto) 0.5 L, Mono # (Auto) 0.6, Eos # (Auto) 0.0, Baso # (Auto) 0.0


04/19/18 15:19: Puncture Site Lrad, O2 Saturation 90.0 L, ABG pH 7.443, ABG 

pCO2 44.8, ABG pO2 58.0 L*, ABG HCO3 30.6 H, ABG Total CO2 32 H, ABG Base 

Excess 7 H, Onur Test +, FiO2 % 21.0





ASSESSMENT:  


1.  PNEUMONIA


2.  ANEMIA


3.  DEHYDRATION


4.  RIGHT LUNG CARCINOMA





PLAN:


1.  Solucortef q.6


2.  Type and cross 2 units


3.  Hold Zestril


4.  Vancomycin 500 mg q12. IV  


5.  Continue Morphine p.r.n.


6.  Continue Toradol 





Plan and coordination of the patient's care discussed in the presence of Case 

Manager and nurse.





CONDITION:  Stable











SCRIBED BY:


IRIS CANELA  scribed while in presence of service performed by 

Dr. Veloz/LOIS Siegel on 04/20/18 ([id number])

## 2018-04-21 RX ADMIN — KETOROLAC TROMETHAMINE SCH MG: 30 INJECTION, SOLUTION INTRAMUSCULAR; INTRAVENOUS at 13:04

## 2018-04-21 RX ADMIN — Medication PRN SYR: at 22:07

## 2018-04-21 RX ADMIN — Medication SCH EACH: at 08:49

## 2018-04-21 RX ADMIN — GUAIFENESIN SCH MG: 600 TABLET, EXTENDED RELEASE ORAL at 22:01

## 2018-04-21 RX ADMIN — FERROUS SULFATE TAB EC 324 MG (65 MG FE EQUIVALENT) SCH MG: 324 (65 FE) TABLET DELAYED RESPONSE at 08:49

## 2018-04-21 RX ADMIN — HYDROCODONE POLISTIREX AND CHLORPHENIRAMINE POLISTIREX PRN ML: 10; 8 SUSPENSION, EXTENDED RELEASE ORAL at 17:41

## 2018-04-21 RX ADMIN — ASPIRIN SCH MG: 81 TABLET, COATED ORAL at 09:51

## 2018-04-21 RX ADMIN — ONDANSETRON HYDROCHLORIDE SCH MG: 4 TABLET, FILM COATED ORAL at 08:49

## 2018-04-21 RX ADMIN — DEXTROSE AND SODIUM CHLORIDE SCH MLS/HR: 5; .45 INJECTION, SOLUTION INTRAVENOUS at 02:34

## 2018-04-21 RX ADMIN — HYDROCORTISONE SODIUM SUCCINATE SCH MG: 250 INJECTION, POWDER, FOR SOLUTION INTRAMUSCULAR; INTRAVENOUS at 13:04

## 2018-04-21 RX ADMIN — ALPRAZOLAM PRN MG: 0.5 TABLET ORAL at 17:41

## 2018-04-21 RX ADMIN — LEVALBUTEROL HYDROCHLORIDE SCH VIAL: 1.25 SOLUTION RESPIRATORY (INHALATION) at 11:36

## 2018-04-21 RX ADMIN — ONDANSETRON HYDROCHLORIDE SCH MG: 4 TABLET, FILM COATED ORAL at 16:39

## 2018-04-21 RX ADMIN — Medication SCH UNIT: at 16:40

## 2018-04-21 RX ADMIN — Medication SCH MG: at 22:01

## 2018-04-21 RX ADMIN — HYDROCORTISONE SODIUM SUCCINATE SCH MG: 250 INJECTION, POWDER, FOR SOLUTION INTRAMUSCULAR; INTRAVENOUS at 06:01

## 2018-04-21 RX ADMIN — ONDANSETRON HYDROCHLORIDE SCH MG: 4 TABLET, FILM COATED ORAL at 13:04

## 2018-04-21 RX ADMIN — IPRATROPIUM BROMIDE AND ALBUTEROL SCH SPRAY: 20; 100 SPRAY, METERED RESPIRATORY (INHALATION) at 08:49

## 2018-04-21 RX ADMIN — CLOPIDOGREL SCH MG: 75 TABLET, FILM COATED ORAL at 08:49

## 2018-04-21 RX ADMIN — CIMETIDINE SCH MG: 400 TABLET, FILM COATED ORAL at 16:39

## 2018-04-21 RX ADMIN — AZITHROMYCIN DIHYDRATE SCH MG: 250 TABLET, FILM COATED ORAL at 08:49

## 2018-04-21 RX ADMIN — Medication SCH MG: at 08:49

## 2018-04-21 RX ADMIN — HYDROCODONE BITARTRATE AND ACETAMINOPHEN PRN TAB: 7.5; 325 TABLET ORAL at 17:41

## 2018-04-21 RX ADMIN — ONDANSETRON HYDROCHLORIDE SCH MG: 4 TABLET, FILM COATED ORAL at 22:01

## 2018-04-21 RX ADMIN — DEXTROSE AND SODIUM CHLORIDE SCH: 5; .45 INJECTION, SOLUTION INTRAVENOUS at 17:58

## 2018-04-21 RX ADMIN — LEVALBUTEROL HYDROCHLORIDE SCH: 1.25 SOLUTION RESPIRATORY (INHALATION) at 00:49

## 2018-04-21 RX ADMIN — FERROUS SULFATE TAB EC 324 MG (65 MG FE EQUIVALENT) SCH MG: 324 (65 FE) TABLET DELAYED RESPONSE at 22:01

## 2018-04-21 RX ADMIN — IPRATROPIUM BROMIDE AND ALBUTEROL SCH SPRAY: 20; 100 SPRAY, METERED RESPIRATORY (INHALATION) at 16:39

## 2018-04-21 RX ADMIN — Medication PRN SYR: at 16:39

## 2018-04-21 RX ADMIN — Medication SCH MG: at 16:39

## 2018-04-21 RX ADMIN — ESCITALOPRAM SCH MG: 10 TABLET, FILM COATED ORAL at 08:49

## 2018-04-21 RX ADMIN — PANTOPRAZOLE SODIUM SCH MG: 40 TABLET, DELAYED RELEASE ORAL at 16:39

## 2018-04-21 RX ADMIN — Medication SCH UNIT: at 22:07

## 2018-04-21 RX ADMIN — FENOFIBRATE SCH MG: 160 TABLET, FILM COATED ORAL at 08:49

## 2018-04-21 RX ADMIN — HYDROCORTISONE SODIUM SUCCINATE SCH MG: 250 INJECTION, POWDER, FOR SOLUTION INTRAMUSCULAR; INTRAVENOUS at 22:06

## 2018-04-21 RX ADMIN — IPRATROPIUM BROMIDE AND ALBUTEROL SCH SPRAY: 20; 100 SPRAY, METERED RESPIRATORY (INHALATION) at 13:04

## 2018-04-21 RX ADMIN — PANTOPRAZOLE SODIUM SCH MG: 40 TABLET, DELAYED RELEASE ORAL at 05:59

## 2018-04-21 RX ADMIN — IPRATROPIUM BROMIDE AND ALBUTEROL SCH SPRAY: 20; 100 SPRAY, METERED RESPIRATORY (INHALATION) at 22:00

## 2018-04-21 RX ADMIN — TRAVOPROST SCH DROP: 0.04 SOLUTION/ DROPS OPHTHALMIC at 22:00

## 2018-04-21 RX ADMIN — LEVALBUTEROL HYDROCHLORIDE SCH VIAL: 1.25 SOLUTION RESPIRATORY (INHALATION) at 17:10

## 2018-04-21 RX ADMIN — CIMETIDINE SCH MG: 400 TABLET, FILM COATED ORAL at 05:59

## 2018-04-21 RX ADMIN — CEFTRIAXONE SODIUM SCH MLS/HR: 1 INJECTION, POWDER, FOR SOLUTION INTRAMUSCULAR; INTRAVENOUS at 08:48

## 2018-04-21 RX ADMIN — KETOROLAC TROMETHAMINE SCH MG: 30 INJECTION, SOLUTION INTRAMUSCULAR; INTRAVENOUS at 22:07

## 2018-04-21 RX ADMIN — VANCOMYCIN HYDROCHLORIDE SCH MLS/HR: 1 INJECTION, POWDER, LYOPHILIZED, FOR SOLUTION INTRAVENOUS at 09:51

## 2018-04-21 RX ADMIN — LEVALBUTEROL HYDROCHLORIDE SCH: 1.25 SOLUTION RESPIRATORY (INHALATION) at 05:07

## 2018-04-21 RX ADMIN — KETOROLAC TROMETHAMINE SCH MG: 30 INJECTION, SOLUTION INTRAMUSCULAR; INTRAVENOUS at 06:00

## 2018-04-21 RX ADMIN — GUAIFENESIN SCH MG: 600 TABLET, EXTENDED RELEASE ORAL at 08:49

## 2018-04-21 RX ADMIN — SIMVASTATIN SCH MG: 40 TABLET, FILM COATED ORAL at 22:02

## 2018-04-22 RX ADMIN — Medication SCH PATCH: at 09:19

## 2018-04-22 RX ADMIN — CEFTRIAXONE SODIUM SCH MLS/HR: 1 INJECTION, POWDER, FOR SOLUTION INTRAMUSCULAR; INTRAVENOUS at 09:19

## 2018-04-22 RX ADMIN — GUAIFENESIN SCH MG: 600 TABLET, EXTENDED RELEASE ORAL at 09:21

## 2018-04-22 RX ADMIN — Medication PRN SYR: at 05:47

## 2018-04-22 RX ADMIN — IPRATROPIUM BROMIDE AND ALBUTEROL SCH SPRAY: 20; 100 SPRAY, METERED RESPIRATORY (INHALATION) at 12:45

## 2018-04-22 RX ADMIN — HYDROCORTISONE SODIUM SUCCINATE SCH MG: 250 INJECTION, POWDER, FOR SOLUTION INTRAMUSCULAR; INTRAVENOUS at 09:20

## 2018-04-22 RX ADMIN — CIMETIDINE SCH MG: 400 TABLET, FILM COATED ORAL at 16:10

## 2018-04-22 RX ADMIN — Medication SCH MG: at 09:20

## 2018-04-22 RX ADMIN — ALPRAZOLAM PRN MG: 0.5 TABLET ORAL at 09:20

## 2018-04-22 RX ADMIN — ONDANSETRON HYDROCHLORIDE SCH MG: 4 TABLET, FILM COATED ORAL at 12:45

## 2018-04-22 RX ADMIN — ASPIRIN SCH MG: 81 TABLET, COATED ORAL at 09:20

## 2018-04-22 RX ADMIN — Medication SCH: at 12:45

## 2018-04-22 RX ADMIN — Medication SCH UNIT: at 21:32

## 2018-04-22 RX ADMIN — PANTOPRAZOLE SODIUM SCH MG: 40 TABLET, DELAYED RELEASE ORAL at 16:10

## 2018-04-22 RX ADMIN — HYDROCODONE BITARTRATE AND ACETAMINOPHEN PRN TAB: 7.5; 325 TABLET ORAL at 21:45

## 2018-04-22 RX ADMIN — KETOROLAC TROMETHAMINE SCH MG: 30 INJECTION, SOLUTION INTRAMUSCULAR; INTRAVENOUS at 05:47

## 2018-04-22 RX ADMIN — ONDANSETRON HYDROCHLORIDE SCH MG: 4 TABLET, FILM COATED ORAL at 09:20

## 2018-04-22 RX ADMIN — FERROUS SULFATE TAB EC 324 MG (65 MG FE EQUIVALENT) SCH MG: 324 (65 FE) TABLET DELAYED RESPONSE at 21:37

## 2018-04-22 RX ADMIN — Medication SCH UNIT: at 12:45

## 2018-04-22 RX ADMIN — LEVALBUTEROL HYDROCHLORIDE SCH: 1.25 SOLUTION RESPIRATORY (INHALATION) at 13:40

## 2018-04-22 RX ADMIN — FERROUS SULFATE TAB EC 324 MG (65 MG FE EQUIVALENT) SCH MG: 324 (65 FE) TABLET DELAYED RESPONSE at 09:21

## 2018-04-22 RX ADMIN — VANCOMYCIN HYDROCHLORIDE SCH MLS/HR: 1 INJECTION, POWDER, LYOPHILIZED, FOR SOLUTION INTRAVENOUS at 10:29

## 2018-04-22 RX ADMIN — Medication SCH EACH: at 09:21

## 2018-04-22 RX ADMIN — Medication SCH UNIT: at 05:52

## 2018-04-22 RX ADMIN — FENOFIBRATE SCH MG: 160 TABLET, FILM COATED ORAL at 09:20

## 2018-04-22 RX ADMIN — KETOROLAC TROMETHAMINE SCH MG: 30 INJECTION, SOLUTION INTRAMUSCULAR; INTRAVENOUS at 21:32

## 2018-04-22 RX ADMIN — ALPRAZOLAM PRN MG: 0.5 TABLET ORAL at 21:45

## 2018-04-22 RX ADMIN — TRAVOPROST SCH DROP: 0.04 SOLUTION/ DROPS OPHTHALMIC at 21:35

## 2018-04-22 RX ADMIN — LEVALBUTEROL HYDROCHLORIDE SCH VIAL: 1.25 SOLUTION RESPIRATORY (INHALATION) at 04:49

## 2018-04-22 RX ADMIN — Medication SCH MG: at 16:10

## 2018-04-22 RX ADMIN — CIMETIDINE SCH MG: 400 TABLET, FILM COATED ORAL at 05:47

## 2018-04-22 RX ADMIN — ESCITALOPRAM SCH MG: 10 TABLET, FILM COATED ORAL at 09:21

## 2018-04-22 RX ADMIN — Medication SCH MG: at 21:37

## 2018-04-22 RX ADMIN — GUAIFENESIN SCH MG: 600 TABLET, EXTENDED RELEASE ORAL at 21:36

## 2018-04-22 RX ADMIN — Medication PRN SYR: at 21:33

## 2018-04-22 RX ADMIN — ONDANSETRON HYDROCHLORIDE SCH MG: 4 TABLET, FILM COATED ORAL at 16:10

## 2018-04-22 RX ADMIN — ALPRAZOLAM PRN MG: 0.5 TABLET ORAL at 16:10

## 2018-04-22 RX ADMIN — IPRATROPIUM BROMIDE AND ALBUTEROL SCH SPRAY: 20; 100 SPRAY, METERED RESPIRATORY (INHALATION) at 21:37

## 2018-04-22 RX ADMIN — HYDROCODONE POLISTIREX AND CHLORPHENIRAMINE POLISTIREX PRN ML: 10; 8 SUSPENSION, EXTENDED RELEASE ORAL at 10:29

## 2018-04-22 RX ADMIN — Medication PRN SYR: at 12:45

## 2018-04-22 RX ADMIN — HYDROCODONE BITARTRATE AND ACETAMINOPHEN PRN TAB: 7.5; 325 TABLET ORAL at 10:29

## 2018-04-22 RX ADMIN — PANTOPRAZOLE SODIUM SCH MG: 40 TABLET, DELAYED RELEASE ORAL at 05:47

## 2018-04-22 RX ADMIN — LEVALBUTEROL HYDROCHLORIDE SCH: 1.25 SOLUTION RESPIRATORY (INHALATION) at 18:23

## 2018-04-22 RX ADMIN — KETOROLAC TROMETHAMINE SCH MG: 30 INJECTION, SOLUTION INTRAMUSCULAR; INTRAVENOUS at 12:45

## 2018-04-22 RX ADMIN — ONDANSETRON HYDROCHLORIDE SCH MG: 4 TABLET, FILM COATED ORAL at 21:37

## 2018-04-22 RX ADMIN — Medication SCH: at 16:13

## 2018-04-22 RX ADMIN — LEVALBUTEROL HYDROCHLORIDE SCH: 1.25 SOLUTION RESPIRATORY (INHALATION) at 00:52

## 2018-04-22 RX ADMIN — IPRATROPIUM BROMIDE AND ALBUTEROL SCH SPRAY: 20; 100 SPRAY, METERED RESPIRATORY (INHALATION) at 09:19

## 2018-04-22 RX ADMIN — IPRATROPIUM BROMIDE AND ALBUTEROL SCH SPRAY: 20; 100 SPRAY, METERED RESPIRATORY (INHALATION) at 16:10

## 2018-04-22 RX ADMIN — SIMVASTATIN SCH MG: 40 TABLET, FILM COATED ORAL at 21:35

## 2018-04-22 RX ADMIN — CLOPIDOGREL SCH MG: 75 TABLET, FILM COATED ORAL at 09:20

## 2018-04-22 RX ADMIN — HYDROCORTISONE SODIUM SUCCINATE SCH MG: 250 INJECTION, POWDER, FOR SOLUTION INTRAMUSCULAR; INTRAVENOUS at 21:27

## 2018-04-23 VITALS — TEMPERATURE: 98.1 F | SYSTOLIC BLOOD PRESSURE: 110 MMHG | DIASTOLIC BLOOD PRESSURE: 63 MMHG

## 2018-04-23 RX ADMIN — CIMETIDINE SCH MG: 400 TABLET, FILM COATED ORAL at 05:45

## 2018-04-23 RX ADMIN — GUAIFENESIN SCH MG: 600 TABLET, EXTENDED RELEASE ORAL at 09:12

## 2018-04-23 RX ADMIN — FENOFIBRATE SCH MG: 160 TABLET, FILM COATED ORAL at 09:12

## 2018-04-23 RX ADMIN — Medication SCH: at 09:15

## 2018-04-23 RX ADMIN — CLOPIDOGREL SCH MG: 75 TABLET, FILM COATED ORAL at 09:12

## 2018-04-23 RX ADMIN — Medication PRN SYR: at 05:49

## 2018-04-23 RX ADMIN — VANCOMYCIN HYDROCHLORIDE SCH MLS/HR: 1 INJECTION, POWDER, LYOPHILIZED, FOR SOLUTION INTRAVENOUS at 10:12

## 2018-04-23 RX ADMIN — Medication SCH EACH: at 09:12

## 2018-04-23 RX ADMIN — KETOROLAC TROMETHAMINE SCH MG: 30 INJECTION, SOLUTION INTRAMUSCULAR; INTRAVENOUS at 05:51

## 2018-04-23 RX ADMIN — PANTOPRAZOLE SODIUM SCH MG: 40 TABLET, DELAYED RELEASE ORAL at 05:45

## 2018-04-23 RX ADMIN — FERROUS SULFATE TAB EC 324 MG (65 MG FE EQUIVALENT) SCH MG: 324 (65 FE) TABLET DELAYED RESPONSE at 09:12

## 2018-04-23 RX ADMIN — ASPIRIN SCH MG: 81 TABLET, COATED ORAL at 09:12

## 2018-04-23 RX ADMIN — LEVALBUTEROL HYDROCHLORIDE SCH VIAL: 1.25 SOLUTION RESPIRATORY (INHALATION) at 11:11

## 2018-04-23 RX ADMIN — LEVALBUTEROL HYDROCHLORIDE SCH: 1.25 SOLUTION RESPIRATORY (INHALATION) at 00:29

## 2018-04-23 RX ADMIN — ONDANSETRON HYDROCHLORIDE SCH MG: 4 TABLET, FILM COATED ORAL at 09:12

## 2018-04-23 RX ADMIN — Medication SCH MG: at 09:12

## 2018-04-23 RX ADMIN — IPRATROPIUM BROMIDE AND ALBUTEROL SCH SPRAY: 20; 100 SPRAY, METERED RESPIRATORY (INHALATION) at 09:11

## 2018-04-23 RX ADMIN — ESCITALOPRAM SCH MG: 10 TABLET, FILM COATED ORAL at 09:12

## 2018-04-23 RX ADMIN — LEVALBUTEROL HYDROCHLORIDE SCH VIAL: 1.25 SOLUTION RESPIRATORY (INHALATION) at 04:40

## 2018-04-23 RX ADMIN — CEFTRIAXONE SODIUM SCH MLS/HR: 1 INJECTION, POWDER, FOR SOLUTION INTRAMUSCULAR; INTRAVENOUS at 09:11

## 2018-04-23 RX ADMIN — Medication SCH: at 09:16

## 2018-04-23 RX ADMIN — HYDROCORTISONE SODIUM SUCCINATE SCH MG: 250 INJECTION, POWDER, FOR SOLUTION INTRAMUSCULAR; INTRAVENOUS at 09:13

## 2018-04-23 NOTE — CM.DICTOOL
ADMISSION: 


04/19/18 14:55





DISCHARGE: 


4/23/18





DATE OF SERVICE: 04/23/18





FINAL DIAGNOSIS





ACUTE PNEUMONIA, RIGHT UPPER LOBE


DYSPNEA


LUNG CA - RADIATION COMPLETE; CHEMO IN PROGRESS (DR. JEROME)


ANEMIA (TRANSFUSIONS X2, 4/20/18)





HTN


DYSLIPIDEMIA


INFRARENAL FUSIFORM AAA, 3.3 CM (PER CT 2013, 2016 AND 2017)


COPD


CERVICAL CANCER S/P HYSTERECTOMY


DIVERTICULOSIS


ANXIETY/DEPRESSION


OSTEOARTHRITIS


DJD SPINE


GLAUCOMA





CATARACT SURGERY LEFT EYE


ENDARTECTOMY 


LEFT BREAST LUMPECTOMY


HYSTERECTOMY





CURRENT EVERY DAY SMOKER 





LAST VITALS











Temp Pulse Resp BP Pulse Ox


 


 98.1 F   95 H  14   110/63   96 


 


 04/23/18 10:00  04/23/18 10:00  04/23/18 10:00  04/23/18 10:00  04/23/18 10:00








TAKE THESE MEDICATIONS AT HOME





Albuterol/Ipratropium (Combivent Respimat Inhal Spray)  1 spray IH BID PRN 


   Last Admin: 04/23/18 09:11 Dose:  1 spray


Alendronate Sodium (Fosamax)  70 mg PO Mo@0630 [facility]


   Last Admin: 04/23/18 06:38 Dose:  70 mg


Alprazolam (Xanax)  0.5 mg PO TID PRN


   PRN Reason: Anxiety


   Last Admin: 04/22/18 21:45 Dose:  0.5 mg


Aspirin (Aspirin Ec)  81 mg PO DAILYWM [facility]


   Last Admin: 04/23/18 09:12 Dose:  81 mg


Calcium/Vitamin D (Calcium 500 + Vit D 200 Mg Tablet)  1 each PO DAILY [facility]


   Last Admin: 04/23/18 09:12 Dose:  1 each


Cimetidine (Tagamet)  800 mg PO BIDAC [facility]


   Last Admin: 04/23/18 05:45 Dose:  800 mg


Clopidogrel Bisulfate (Plavix)  75 mg PO DAILY [facility]


   Last Admin: 04/23/18 09:12 Dose:  75 mg


Dorzolamide HCL/Timolol Maleat  1 Drop EACH EYE BID


Escitalopram Oxalate (Lexapro)  10 mg PO DAILY [facility]


   Last Admin: 04/23/18 09:12 Dose:  10 mg


Fenofibrate (Triglide)  160 mg PO DAILY [facility]


   Last Admin: 04/23/18 09:12 Dose:  160 mg


Ferrous Sulfate (Ferrous Sulfate)  324 mg PO BID [facility]


   Last Admin: 04/23/18 09:12 Dose:  324 mg


Fish Oil (Omega-3 Fish Oil)  1,000 mg PO TID [facility]


   Last Admin: 04/23/18 09:12 Dose:  1,000 mg


Guaifenesin (Mucinex)  600 mg PO Q12HR [facility]


   Last Admin: 04/23/18 09:12 Dose:  600 mg


Hydrocodone Bitart/Acetaminophen (Norco 7.5-325)  1 tab PO TID PRN


   PRN Reason: Mild Pain


   Last Admin: 04/22/18 21:45 Dose:  1 tab


Hydroxyzine HCl (Vistaril Inj)  25 mg IM Q4H PRN


   PRN Reason: Nausea/Vomiting/Restlessness


   Last Admin: 04/22/18 21:45 Dose:  25 mg


Ipratropium/Albuterol Neb (Duoneb) 1 Vial QID


Lisinopril 20 mg PO BID


Ondansetron HCl (Zofran Tab)  8 mg PO QID [facility]


   Last Admin: 04/23/18 09:12 Dose:  8 mg


Pantoprazole Sodium (Protonix)  40 mg PO BIDAC [facility]


   Last Admin: 04/23/18 05:45 Dose:  40 mg


Simvastatin (Zocor)  40 mg PO BEDTIME [facility]


   Last Admin: 04/22/18 21:35 Dose:  40 mg


Travoprost (Travatan Z)  1 drop OP BEDTIME [facility]


   Last Admin: 04/22/18 21:35 Dose:  1 drop





CHANGES TO MEDICATIONS


Duonebs 1 Vial INH QID (changed from TID)


Combivent 2 sprays IH BID PRN (changed from TID scheduled)


See new prescriptions





ALLERGIES





No Known Allergies Allergy (Unverified 02/25/17 02:42)





NEW PRESCRIPTIONS: 





RESUME YOUR HOME MEDICATIONS AS PER LIST PROVIDED BY THE NURSING STAFF


TAKE YOUR COMBIVENT INHALER (IPRATROPIUM/ALBUTEROL) TWICE DAILY IF NEEDED FOR 

WHEEZING AND/OR COUGHING





NEW PRESCRIPTIONS


ALBUTEROL/IPRATROPIUM (DUONEBS) EVERY 6 HOURS


PREDNISONE 20 MG, TAKE ONE TABLET BY MOUTH WITH FOOD TWICE DAILY FOR 5 DAYS


TUSSIONEX 5 ML (ONE TEASPOON) BY MOUTH TWICE DAILY IF NEEDED FOR COUGHING


KEFLEX 500 MG, TAKE ONE CAPSULE BY MOUTH THREE TIMES DAILY FOR 10 DAYS





SMOKING: 





CURRENT EVERYDAY SMOKER





THE PATIENT HAS RECEIVED EDUCATION/INFORMATION FOR COMPLETE SMOKING CESSATION.  

SHE IS AWARE OF THE ADVERSE EFFECTS ON HER CARDIOPULMONARY/CARDIOVASCULAR 

HEALTH CONTINUING THIS HABIT WILL CAUSE.  SHE HAS NOT VERBALIZED ANY INTENT TO 

STOP SMOKING AT THIS TIME.  WE WILL CONTINUE TO ENCOURAGE COMPLETE CESSATION 

DURING HER OFFICE VISITS.





DISEASE SPECIFIC EDUCATION: 





PNEUMONITIS


DEHYDRATION


ANEMIA


BLOOD TRANSFUSION


HOME MEDICATIONS


NEW PRESCRIPTIONS


ADVERSE EFFECTS OF LONG TERM USE OF STEROIDS


FOLLOW UP WITH ONCOLOGIST


FOLLOW UP THROUGH THE OFFICE





LAB REVIEW:





 04/23/18 05:08 





 04/23/18 05:08 











04/23/18 05:08: Sodium 141, Potassium 4.2, Chloride 102, Carbon Dioxide 30, 

Anion Gap 13.2, BUN 26 H, Creatinine 1.22, Estimated GFR (MDRD) 43.00, BUN/

Creatinine Ratio 21.31, Glucose 113, Calcium 8.6, Total Bilirubin 0.4, AST 16, 

ALT 25, Alkaline Phosphatase 49 L, Total Protein 5.3 L, Albumin 1.9 L, Globulin 

3.4, Albumin/Globulin Ratio 0.56


04/23/18 05:08: WBC 8.11, RBC 2.96 L, Hgb 9.7 L, Hct 30.2 L, .0 H, MCH 

32.8 H, MCHC 32.1, RDW Coeff of Federico 21.4 H, Plt Count 93 L, Immature Gran % (

Auto) 2.1, Neut % (Auto) 87.6, Lymph % (Auto) 5.3 L, Mono % (Auto) 4.9, Eos % (

Auto) 0.0, Baso % (Auto) 0.1, Immature Gran # (Auto) 0.2, Neut # (Auto) 7.1 H, 

Lymph # (Auto) 0.4 L, Mono # (Auto) 0.4, Eos # (Auto) 0.0, Baso # (Auto) 0.0





PLAN: 





DISCHARGE HOME TODAY





RETURN TO SEE DR. WATSON IN HIS OFFICE ON 4/27/18 AT 9:30 A.M.





KEEP ALL APPOINTMENTS WITH DR. JEROME


NEXT APPOINTMENT IS SCHEDULED ON 4/30/18 AT 3 P.M.


TODAY'S LABS HAVE BEEN FAXED TO DR. JEROME'S OFFICE FOR YOUR RECORDS





RESUME YOUR HOME MEDICATIONS AS PER LIST PROVIDED BY THE NURSING STAFF


TAKE YOUR COMBIVENT INHALER (IPRATROPIUM/ALBUTEROL) TWICE DAILY IF NEEDED FOR 

WHEEZING AND/OR COUGHING





NEW PRESCRIPTIONS


ALBUTEROL/IPRATROPIUM (DUONEBS) EVERY 6 HOURS


PREDNISONE 20 MG, TAKE ONE TABLET BY MOUTH WITH FOOD TWICE DAILY FOR 5 DAYS


TUSSIONEX 5 ML (ONE TEASPOON) BY MOUTH TWICE DAILY IF NEEDED FOR COUGHING


KEFLEX 500 MG, TAKE ONE CAPSULE BY MOUTH THREE TIMES DAILY FOR 10 DAYS





ACTIVITY


GET PLENTY OF REST AT HOME.  GRADUALLY INCREASE YOUR ACTIVITY LEVEL ACCORDING 

TO YOUR TOLERATION





DIET


REGULAR AS TOLERATED





SUMMARY





THE PATIENT IS ALERT AND ORIENTED X3.  SHE CURRENTLY RESIDES AT HOME WITH HER 

SON AND DESIRES TO RETURN THERE AT DISCHARGE.  SHE HAS BEEN INDEPENDENT WITH ADL

'S.  SHE HAS AN OLDER NEBULIZER AT HOME FOR USE.





HER SKIN TURGOR IS FAIR TO GOOD.  THE ELASTICITY IS REDUCED.  THE SKIN TO HER 

HANDS IS SHINY AND LOOSE WITH A FRAGILE APPEARANCE.  SHE HAS NO DECUBITUS 

ULCERS AT DISCHARGE.  HYDRATION AND NUTRITIONAL STATUS ARE FAIR TO GOOD AS 

WELL.  





MS. REYNOLDS IS AWARE AND AGREEABLE FOR DISCHARGE PLANS TODAY.  SHE HAD AN 

APPOINTMENT TO HAVE LABS DRAWN FOR DR. JEROME.  WE HAVE FAXED TODAY'S LAB 

RESULTS TO HIS OFFICE.  SHE HAS BEEN INSTRUCTED BY DR. JEROME'S OFFICE TO KEEP 

HER APPOINTMENT FOR 4/30/18 AT 3 P.M.  WE WILL ALSO FOLLOW HER THROUGH THE 

OFFICE AT THE END OF THIS WEEK (4/27/18).





CURRENT CODE STATUS


FULL CODE











______________________________________


LOIS ALBERT











______________________________________


ASHLEIGH WATSON M.D.

## 2018-04-23 NOTE — PCM.PROG
Attending Provider: 


ATTENDING PROVIDER:


Dr. ASHLEIGH VELOZ


This patient is seen with Tami Melendez, Nurse Practitioner.





DATE OF SERVICE:  04/23/18





SUBJECTIVE: 


This 72 year old WHITE/ F was hospitalized 04/19/18.  The patient is 

sitting up in bed, alert.  She states she is feeling better. She has been up to 

bathroom. She has been eating well. 





REVIEW OF SYSTEMS:


CONSTITUTIONAL: Weakness. No night sweats. No malaise, lethargy. No fever or 

chills.


HEENT: Eyes: No visual changes. No eye pain. No eye discharge. ENT: No runny 

nose. No epistaxis. No sinus pain. No odynophagia. No congestion.


RESPIRATORY: Cough, no congestion. No hemoptysis.  No shortness of breath.


CARDIOVASCULAR: No angina symptoms. No CHF symptoms. No atypical chest pain for 

CAD. No palpitations. No orthopnea..


GASTROINTESTINAL: No abdominal pain. No nausea or vomiting. No diarrhea or 

constipation. No hematemesis. No hematochezia.


GENITOURINARY: No urgency. No frequency. No dysuria. No hematuria. No 

obstructive symptoms. No discharge. No pain. No significant abnormal bleeding.


MUSCULOSKELETAL: No musculoskeletal pain; no joint swelling. 


NEUROLOGICAL: Awake, alert, oriented to time, place and person. No headache. No 

neck pain. No syncope. No seizures. No dizziness.


PSYCHIATRIC: Not anxious. No depression. No suicidal thoughts. No homicidal 

thoughts.


SKIN:  No rash. No lesions. No wounds.


ENDOCRINE: No unexplained weight loss. No weight gain. 


HEMATOLOGIC/LYMPHATIC: No anemia. No purpura. No petechiae. No prolonged or 

excessive bleeding. No palpable lymph nodes.





PHYSICAL EXAMINATION:


GENERAL:  The patient is awake, alert and oriented, sitting in bed in no 

distress. 


VITAL SIGNS:  Temperature 98.3 F, Pulse 103, Respiratory Rate 24, /78, 

Pulse Ox 93%


HEENT:  Head normocephalic, atraumatic.  Eyes: Extraocular muscles are intact.  

Pupils are equal, round and reactive to light and accommodation.  Ears:  No 

lesions.  Nose appeared normal. Throat: No exudate or erythema.


NECK: Supple. No JVD, no carotid bruit.  No lymphadenopathy or thyromegaly. 


LUNGS:  Diminished breath sounds.  Right upper lobe rhonchi.   Percussion note 

normal.  Chest symmetrical. 


HEART:  S1, S2, no S3. No murmurs.  No cyanosis or clubbing.  No ascites.  

Pulses: Dorsalis pedis and posterior tibial pulses +1 to +2 both sides. 


ABDOMEN:  Soft.  Non-tender.  Bowel sounds active. No CVA tenderness. No mass 

felt. 


EXTREMITIES:  No edema.  Full range of motion of all extremities, equal. 


NEUROLOGIC:  No focal deficit. Cranial nerves II through XII are grossly 

intact.  No headache, no double vision or headache.  


SKIN: Not dry.  Intact.  Turgor-normal. 


LYMPHATIC:  No palpable lymph nodes/no lymphedema. 


MUSCULOSKELETAL:  Normal joints with no swelling. Muscle tone is normal. 








LAB REVIEW:





 04/23/18 05:08 





 04/23/18 05:08 











04/23/18 05:08: Sodium 141, Potassium 4.2, Chloride 102, Carbon Dioxide 30, 

Anion Gap 13.2, BUN 26 H, Creatinine 1.22, Estimated GFR (MDRD) 43.00, BUN/

Creatinine Ratio 21.31, Glucose 113, Calcium 8.6, Total Bilirubin 0.4, AST 16, 

ALT 25, Alkaline Phosphatase 49 L, Total Protein 5.3 L, Albumin 1.9 L, Globulin 

3.4, Albumin/Globulin Ratio 0.56


04/23/18 05:08: WBC 8.11, RBC 2.96 L, Hgb 9.7 L, Hct 30.2 L, .0 H, MCH 

32.8 H, MCHC 32.1, RDW Coeff of Federico 21.4 H, Plt Count 93 L, Immature Gran % (

Auto) 2.1, Neut % (Auto) 87.6, Lymph % (Auto) 5.3 L, Mono % (Auto) 4.9, Eos % (

Auto) 0.0, Baso % (Auto) 0.1, Immature Gran # (Auto) 0.2, Neut # (Auto) 7.1 H, 

Lymph # (Auto) 0.4 L, Mono # (Auto) 0.4, Eos # (Auto) 0.0, Baso # (Auto) 0.0





ASSESSMENT: 


1.  PNEUMONIA, RIGHT UPPER LOBE


2.  ANEMIA


3.  DEHYDRATION, RESOLVED


4.  RIGHT LUNG CARCINOMA





PLAN:


1.  Discharge home.


2.  She has appointment with Dr. Marie tomorrow for chemotherapy. 


3.  Duonebs q.6hr has neb machine at home.


4.  The patient will be seen Thursday or Friday in our office if she doesn't 

see Dr. Marie.


5.  Keflex 500 mg t.i.d. for 10 days.


6.  Prednisone 20 mg b.i.d. for 5 days. 


7.  Tussionex one teaspoon p.o. b.i.d. 





Plan and coordination of the patient's care discussed in the presence of Case 

Manager and nurse.





CONDITION:  Stable











SCRIBED BY:


IRIS CANELA  scribed while in presence of service performed by 

Dr. Veloz/LOIS Siegel on 04/23/18 ([id number])

## 2018-04-24 NOTE — PN
DATE OF SERVICE:  04/23/18



SUBJECTIVE:  

With history of C of lung right side on chemotherapy and radiation was 
hospitalized with bilateral wheezing with acute bronchitis. The patient was 
treated with antibiotics and steroids. Her condition has improved and her 
appetite has improved. There is no audible wheezing without stethoscope. 



PHYSICAL EXAMINATION: 

HEENT:  Head normocephalic, atraumatic.  Eyes: Extraocular muscles are intact.  
Pupils are equal, round and reactive to light and accommodation.  Ears:  No 
lesions.  Nose appeared normal. Throat: No exudate or erythema.

NECK: Supple. No JVD, no carotid bruit.  No lymphadenopathy or thyromegaly. 

LUNGS: Very faint expiratory wheeze otherwise clear to auscultation.  
Percussion note normal.  Chest symmetrical. 

HEART:  S1, S2, no S3. No murmurs.  No cyanosis or clubbing.  No ascites.  
Pulses: Dorsalis pedis and posterior tibial pulses +1 to +2 both sides. 

ABDOMEN:  Soft.  Nontender.  Bowel sounds active. No CVA tenderness. No mass 
felt. 

EXTREMITIES:  No edema.  Full range of motion of all extremities, equal. 

NEUROLOGIC:  No focal deficit. Cranial nerves II through XII are grossly 
intact.  No headache, no double vision or headache. 

SKIN: Not dry.  Intact.  Turgor - normal. 

LYMPHATIC:  No palpable lymph nodes/no lymphedema. 

MUSCULOSKELETAL:  Normal joints with no swelling. Muscle tone is normal. 



PLAN:

1.  The patient will be discharged on steroids and antibiotics 

2.  Nutritional status stressed on her 

3.  She is strongly advised to quit smoking, counseling for smoking done 



CONDITION: Stable. 



TIME SPENT:  More than 30 minutes. 



Plan and coordination of the patient's care discussed in the presence of nurse. 
HOMERO

## 2018-04-26 NOTE — DS
DATE OF SERVICE:  04/23/18



FINAL DIAGNOSIS: 

1.  ACUTE PNEUMONIA, RIGHT UPPER LOBE

2.  DYSPNEA

3.  LUNG CA - RADIATION COMPLETE; CHEMO IN PROGRESS (DR. JEROME)

4.  ANEMIA (TRANSFUSIONS X2, 4/20/18)

5.  HTN

6.  DYSLIPIDEMIA

7.  INFRARENAL FUSIFORM AAA, 3.3 CM (PER CT 2013, 2016 AND 2017)

8.  COPD

9.  CERVICAL CANCER S/P HYSTERECTOMY

10. DIVERTICULOSIS

11. ANXIETY/DEPRESSION

12. OSTEOARTHRITIS

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

14. GLAUCOMA

15. CATARACT SURGERY LEFT EYE

16. ENDARTERECTOMY 

17. LEFT BREAST LUMPECTOMY

18. HYSTERECTOMY

19. CURRENT EVERY DAY SMOKER



DISCHARGE INSTRUCTIONS:

1.  RETURN TO SEE DR. WATSON IN HIS OFFICE ON 4/27/18 AT 9:30 A.M.

2.  KEEP ALL APPOINTMENTS WITH DR. JEROME - NEXT APPOINTMENT IS SCHEDULED ON 4/
30/18 AT 3 P.M. TODAY'S LABS HAVE BEEN FAXED TO DR. JEROME'S OFFICE FOR YOUR 
RECORDS



MEDICATIONS AT DISCHARGE:

1.  Albuterol/Ipratropium (Combivent Respimat Inhal Spray)  1 spray IH BID PRN 

2.  Alendronate Sodium (Fosamax)  70 mg PO Mo@0630 VEENA

3.  Alprazolam (Xanax)  0.5 mg PO TID PRN

4.  Aspirin (Aspirin Ec)  81 mg PO DAILYWM VEENA

5.  Calcium/Vitamin D (Calcium 500 + Vit D 200 Mg Tablet)  1 each PO DAILY VEENA

6.  Cimetidine (Tagamet)  800 mg PO BIDAC VEENA

7.  Clopidogrel Bisulfate (Plavix)  75 mg PO DAILY VEENA

8.  Dorzolamide HCL/Timolol Maleat  1 Drop EACH EYE BID

9.  Escitalopram Oxalate (Lexapro)  10 mg PO DAILY VEENA

10. Fenofibrate (Triglide)  160 mg PO DAILY VEENA

11. Ferrous Sulfate (Ferrous Sulfate)  324 mg PO BID VEENA

12. Fish Oil (Omega-3 Fish Oil)  1,000 mg PO TID VEENA

13. Guaifenesin (Mucinex)  600 mg PO Q12HR VEENA

14. Hydrocodone Bitart/Acetaminophen (Norco 7.5-325)  1 tab PO TID PRN

15. Hydroxyzine HCl (Vistaril Inj)  25 mg IM Q4H PRN

16. Ipratropium/Albuterol Neb (Duoneb) 1 Vial QID

17. Lisinopril 20 mg PO BID

18. Ondansetron HCl (Zofran Tab)  8 mg PO QID VEENA

19. Pantoprazole Sodium (Protonix)  40 mg PO BIDAC VEENA

20. Simvastatin (Zocor)  40 mg PO BEDTIME VEENA

21. Travoprost (Travatan Z)  1 drop OP BEDTIME



CHANGES TO MEDICATIONS: 

1. Duonebs 1 Vial INH QID (changed from TID)

2. Combivent 2 sprays IH BID PRN (changed from TID scheduled)



**TAKE YOUR COMBIVENT INHALER (IPRATROPIUM/ALBUTEROL) TWICE DAILY IF NEEDED FOR 
WHEEZING AND/OR COUGHING



NEW PRESCRIPTIONS:

1.  ALBUTEROL/IPRATROPIUM (DUONEBS) EVERY 6 HOURS

2.  PREDNISONE 20 MG, TAKE ONE TABLET BY MOUTH WITH FOOD TWICE DAILY FOR 5 DAYS

3.  TUSSIONEX 5 ML (ONE TEASPOON) BY MOUTH TWICE DAILY IF NEEDED FOR COUGHING

4.  KEFLEX 500 MG, TAKE ONE CAPSULE BY MOUTH THREE TIMES DAILY FOR 10 DAYS



DIET INSTRUCTIONS: 

REGULAR AS TOLERATED



ACTIVITY:

GET PLENTY OF REST AT HOME.  GRADUALLY INCREASE YOUR ACTIVITY LEVEL ACCORDING 
TO YOUR TOLERATION



SMOKING:

Current every day smoker. 



DISEASE SPECIFIC EDUCATION:

PNEUMONITIS

DEHYDRATION

ANEMIA

BLOOD TRANSFUSION

HOME MEDICATIONS

NEW PRESCRIPTIONS

ADVERSE EFFECTS OF LONG TERM USE OF STEROIDS

FOLLOW UP WITH ONCOLOGIST

FOLLOW UP THROUGH THE OFFICE

THE PATIENT HAS RECEIVED EDUCATION/INFORMATION FOR COMPLETE SMOKING CESSATION.  
SHE IS AWARE OF THE ADVERSE EFFECTS ON HER CARDIOPULMONARY/CARDIOVASCULAR 
HEALTH CONTINUING THIS HABIT WILL CAUSE.  SHE HAS NOT VERBALIZED ANY INTENT TO 
STOP SMOKING AT THIS TIME.  WE WILL CONTINUE TO ENCOURAGE COMPLETE CESSATION 
DURING HER OFFICE VISITS.



HOSPITAL COURSE:

This is a 72-year-old white female who was a direct admit from our office. She 
presented to our office with cough, congestion, extreme weakness and shortness 
of breath. She is a cancer patient. She currently has cancer of the right upper 
lobe. She just finished her radiation treatments last week. She is still 
undergoing chemo however she has not had a chemo treatment for the past two 
weeks due to thrombocytopenia. Her radiation physician's assistant placed her 
on Augmentin and Mucinex for her cough and congestion; however this has failed 
and she presents with worsening weakness. She was subsequently admitted from 
our office. We got the results of the previous chest CT which showed a right 
upper lobe nodule with surrounding infection. Chest x-ray confirmed this. She 
was admitted, placed on Rocephin 1 gm IV daily along with Zithromax 500 mg p.o. 
daily for three days and then started on Vancomycin as well. She was also given 
Solu-Cortef 125 mg q.6hr IV, started on Xopenex neb treatments q.6hr as well as 
Pulmicort Neb treatments b.i.d. On the second day of admission her hemoglobin 
was found to be 6.7. Part of this she was anemic initially with a hemoglobin of 
around 8 but she was extremely dehydrated with elevated kidney function. She 
was initially started on IV fluids at D5 1/2 NS at 75 cc/hr. She had also lost 
5 lbs when she was seen in our office. The day following admission her 
hemoglobin was 6.7. She was transfused with 2 units of packed red blood cells 
and since then her hemoglobin was then stable at 9.  Her hemoglobin on the day 
of discharge was 9.7. She does have oxygen that she wears at night at home as 
well as a nebulizer machine that she can use at home. We will discharge her 
with Keflex 500 mg t.i.d. for the next 10 days and Prednisone 10 mg b.i.d. for 
the next 5 days. She is to use her nebulizer machine with Duonebs at least four 
times a day. Again, she has oxygen if needed. Initially she was experiencing 
nausea and not eating much. Now she has been eating 50 to 75% of her meals. 
Telemetry shows normal sinus rhythm. She has been up and about with minimal 
shortness of breath and feels much stronger. We will fax all of her labs and 
contact Dr. Jerome as she is scheduled to have chemo tomorrow and let him make 
the decision on what he would like to do.  She is to see us or Dr. Jerome this 
week. It is understood that if she sees Dr. Jerome this week, she will see us 
next week or vice versa. She is discharged in stable condition. 



LABS:

04/23/18 05:08: Sodium 141, Potassium 4.2, Chloride 102, Carbon Dioxide 30, 
Anion Gap 13.2, BUN 26 H, Creatinine 1.22, Estimated GFR (MDRD) 43.00, BUN/
Creatinine Ratio 21.31, Glucose 113, Calcium 8.6, Total Bilirubin 0.4, AST 16, 
ALT 25, Alkaline Phosphatase 49 L, Total Protein 5.3 L, Albumin 1.9 L, Globulin 
3.4, Albumin/Globulin Ratio 0.56

04/23/18 05:08: WBC 8.11, RBC 2.96 L, Hgb 9.7 L, Hct 30.2 L, .0 H, MCH 
32.8 H, MCHC 32.1, RDW Coeff of Federico 21.4 H, Plt Count 93 L, Immature Gran % (
Auto) 2.1, Neut % (Auto) 87.6, Lymph % (Auto) 5.3 L, Mono % (Auto) 4.9, Eos % (
Auto) 0.0, Baso % (Auto) 0.1, Immature Gran # (Auto) 0.2, Neut # (Auto) 7.1 H, 
Lymph # (Auto) 0.4 L, Mono # (Auto) 0.4, Eos # (Auto) 0.0, Baso # (Auto) 0.0



TIME SPENT:  More than 60 minutes. 
MTDD

## 2018-04-26 NOTE — PN
DATE OF SERVICE:  04/21/18



SUBJECTIVE:  

72-year-old white female. She states she is feeling much better. She received 2 
units of PRBCs yesterday with hemoglobin 6.7. This morning is 9.1. Her color 
has improved. She is less pale. She is less short of breath. She is sitting up 
in bed with no oxygen today and oxygen saturation 95%. She states that she does 
feel like eating some. We did receive her chest CT that was done at [facility] on Wednesday which showed an infectious process around her malignant 
lesion in the right upper lobe. 



REVIEW OF SYSTEMS: 

CONSTITUTIONAL: Improving weakness. No night sweats. No malaise, lethargy. No 
fever or chills.

HEENT: Eyes: No visual changes. No eye pain. No eye discharge. ENT: No runny 
nose. No epistaxis. No sinus pain. No sore throat. No odynophagia. No 
congestion.

RESPIRATORY: Positive for cough.  No congestion. No hemoptysis. No shortness of 
breath. 

CARDIOVASCULAR: No angina symptoms. No CHF symptoms. No atypical chest pain for 
CAD. No palpitations. No orthopnea.

GASTROINTESTINAL: No abdominal pain. No nausea or vomiting. No diarrhea or 
constipation. No hematemesis. No hematochezia.

GENITOURINARY: No urgency. No frequency. No dysuria. No hematuria. No 
obstructive symptoms. No discharge. No pain. No significant abnormal bleeding.

MUSCULOSKELETAL: No musculoskeletal pain; no joint swelling. 

NEUROLOGICAL:  No headache. No neck pain. No syncope. No seizures. No dizziness.

PSYCHIATRIC: Not anxious. No depression. No suicidal thoughts. No homicidal 
thoughts.

SKIN:  No rash. No lesions. No wounds.

ENDOCRINE: No unexplained weight loss. No weight gain. 

HEMATOLOGIC/LYMPHATIC: No anemia. No purpura. No petechiae. No prolonged or 
excessive bleeding. No palpable lymph nodes.



PHYSICAL EXAMINATION: 

VITAL SIGNS:  Temperature 97.4, heart rate 96, respirations 16, /71, 
pulse ox 95% on room air. 

HEENT:  Head normocephalic, atraumatic.  Eyes: Extraocular muscles are intact.  
Pupils are equal, round and reactive to light and accommodation.  Ears:  No 
lesions.  Nose appeared normal. Throat: No exudate or erythema.

NECK: Supple. No JVD, no carotid bruit.  No lymphadenopathy or thyromegaly. 

LUNGS: Diminished breath sounds bilaterally more significantly right upper 
lobe. No wheezing. Percussion note normal.  Chest symmetrical. 

HEART:  S1, S2, no S3. No murmurs.  No cyanosis or clubbing.  No ascites.  
Pulses: Dorsalis pedis and posterior tibial pulses +1 to +2 both sides. 

ABDOMEN:  Soft.  Nontender.  Bowel sounds active. No CVA tenderness. No mass 
felt. 

EXTREMITIES:  No edema.  Full range of motion of all extremities, equal. 

NEUROLOGIC:  No focal deficit. Cranial nerves II through XII are grossly 
intact.  No headache, no double vision or headache. 

SKIN: Color has improved.  Warm and dry.  Intact.  Turgor - normal. 

LYMPHATIC:  No palpable lymph nodes/no lymphedema. 

MUSCULOSKELETAL:  Normal joints with no swelling. Muscle tone is normal. 



LABS: 

Hemoglobin 9.1, hematocrit 28.2, white count 5.6, platelets 74. Sodium 132, 
potassium 4.5, BUN 25, creatinine 1.16.  



ASSESSMENT:

1.  ACUTE PNEUMONITIS

2.  RIGHT UPPER LOBE LUNG CANCER

3.  COPD

4.  DEHYDRATION WHICH HAS RESOLVED

5.  CONSTIPATION



PLAN:

1.  She will get Miralax one capful today as she has not had a bowel movement.

2.  Will D/C her IV fluids as she is eating well and drinking well. 

3.  Her wheezing has significantly improved. I will decrease the Solu-Cortef to 
125 mg q.12hr.  We discussed her getting up and about today if she feels like 
it and anticipate discharge within the next couple of days. 



TIME SPENT:  More than 30 minutes. 



Plan and coordination of the patient's care discussed in the presence of nurse. 
HOMERO

## 2018-05-03 PROBLEM — Z71.2 ENCOUNTER TO DISCUSS X-RAY RESULTS: Status: ACTIVE | Noted: 2018-01-01

## 2018-05-03 PROBLEM — Z92.3 HX OF RADIATION THERAPY: Status: ACTIVE | Noted: 2018-01-01

## 2018-05-04 ENCOUNTER — HOSPITAL ENCOUNTER (INPATIENT)
Dept: HOSPITAL 58 - ED | Age: 73
LOS: 7 days | Discharge: HOME | DRG: 178 | End: 2018-05-11
Attending: INTERNAL MEDICINE | Admitting: INTERNAL MEDICINE
Payer: COMMERCIAL

## 2018-05-04 VITALS — BODY MASS INDEX: 18.9 KG/M2

## 2018-05-04 DIAGNOSIS — D64.9: ICD-10-CM

## 2018-05-04 DIAGNOSIS — C34.11: ICD-10-CM

## 2018-05-04 DIAGNOSIS — Z79.02: ICD-10-CM

## 2018-05-04 DIAGNOSIS — Z95.5: ICD-10-CM

## 2018-05-04 DIAGNOSIS — R50.9: ICD-10-CM

## 2018-05-04 DIAGNOSIS — I10: ICD-10-CM

## 2018-05-04 DIAGNOSIS — F41.8: ICD-10-CM

## 2018-05-04 DIAGNOSIS — J15.1: Primary | ICD-10-CM

## 2018-05-04 DIAGNOSIS — M19.90: ICD-10-CM

## 2018-05-04 DIAGNOSIS — R05: ICD-10-CM

## 2018-05-04 DIAGNOSIS — K57.92: ICD-10-CM

## 2018-05-04 DIAGNOSIS — Z85.41: ICD-10-CM

## 2018-05-04 DIAGNOSIS — Z79.899: ICD-10-CM

## 2018-05-04 DIAGNOSIS — Z95.828: ICD-10-CM

## 2018-05-04 DIAGNOSIS — K21.9: ICD-10-CM

## 2018-05-04 DIAGNOSIS — F17.210: ICD-10-CM

## 2018-05-04 DIAGNOSIS — R06.02: ICD-10-CM

## 2018-05-04 DIAGNOSIS — J44.9: ICD-10-CM

## 2018-05-04 DIAGNOSIS — E78.5: ICD-10-CM

## 2018-05-04 DIAGNOSIS — H92.01: ICD-10-CM

## 2018-05-04 DIAGNOSIS — R53.1: ICD-10-CM

## 2018-05-04 DIAGNOSIS — M47.9: ICD-10-CM

## 2018-05-04 DIAGNOSIS — I71.4: ICD-10-CM

## 2018-05-04 PROCEDURE — 87070 CULTURE OTHR SPECIMN AEROBIC: CPT

## 2018-05-04 PROCEDURE — 87040 BLOOD CULTURE FOR BACTERIA: CPT

## 2018-05-04 PROCEDURE — 36415 COLL VENOUS BLD VENIPUNCTURE: CPT

## 2018-05-04 PROCEDURE — 87081 CULTURE SCREEN ONLY: CPT

## 2018-05-04 PROCEDURE — 87186 SC STD MICRODIL/AGAR DIL: CPT

## 2018-05-04 PROCEDURE — 93005 ELECTROCARDIOGRAM TRACING: CPT

## 2018-05-04 PROCEDURE — 85007 BL SMEAR W/DIFF WBC COUNT: CPT

## 2018-05-04 PROCEDURE — 83605 ASSAY OF LACTIC ACID: CPT

## 2018-05-04 PROCEDURE — 93010 ELECTROCARDIOGRAM REPORT: CPT

## 2018-05-04 PROCEDURE — 82803 BLOOD GASES ANY COMBINATION: CPT

## 2018-05-04 PROCEDURE — 80053 COMPREHEN METABOLIC PANEL: CPT

## 2018-05-04 PROCEDURE — 85025 COMPLETE CBC W/AUTO DIFF WBC: CPT

## 2018-05-04 PROCEDURE — 94640 AIRWAY INHALATION TREATMENT: CPT

## 2018-05-04 PROCEDURE — 99284 EMERGENCY DEPT VISIT MOD MDM: CPT

## 2018-05-04 PROCEDURE — 96365 THER/PROPH/DIAG IV INF INIT: CPT

## 2018-05-04 PROCEDURE — 84145 PROCALCITONIN (PCT): CPT

## 2018-05-04 NOTE — CT
EXAM:  CT of the chest without contrast. 

  

HISTORY:  Shortness of breath.  Cough.  Fever. 

  

PROCEDURE:  Contiguous axial CT images of the chest without contrast with coronal and sagittal reform
ats. 

  

FINDINGS: Comparison made with CT of 02/24/2017. The heart is within normal limits in size.  The thor
acic aorta is within normal limits in diameter.  There are atherosclerotic calcifications in the thor
acic aorta.  There are coronary artery calcifications. There are enlarged right hilar lymph nodes sunshine
suring up to 1.1 cm in short axis. There is a 4 mm nodule in the left lower lobe. The left-sided cent
ral line is in adequate position. Redemonstrated is a cavitary lesion in the right upper lobe which i
s increased in size measuring 9.5 x 9.1 cm with a thickened irregular wall.  There are infiltrates an
d consolidation in the right upper lobe and right middle lobe. There are degenerative changes in the 
spine. The adrenal glands and visualized portion of the liver are normal in appearance. There is aneu
rysmal dilatation of the infrarenal abdominal aorta which measures 3.2 cm in diameter.  The infrarena
l abdominal aorta is incompletely visualized secondary to termination of image acquisition. 

  

Impression: Interval increase in size of 9.5 x 9.1 cm thick-walled cavitary lesion in the right upper
 lobe.  The differential diagnosis includes infection and malignancy. 

  

Right upper lobe and right middle lobe infiltrates and consolidation consistent with infection. 

  

Right hilar lymphadenopathy as described. 

  

4 mm nodule in the left lower lobe.  Recommend follow-up per Fleischner Society recommendations. 

  

Infrarenal abdominal aortic aneurysm as described.

## 2018-05-04 NOTE — ED.PDOC
General


ED Provider: 


Dr. CLAIRE VALDEZ-ER





Chief Complaint: Shortness of Air


Stated Complaint: im coughing and running a fever and sob


Time Seen by Physician: 21:40


Mode of Arrival: Wheelchair


Information Source: Patient, Family


Exam Limitations: No limitations


Primary Care Provider: 


ASHLEIGH BAH





Nursing and Triage Documentation Reviewed and Agree: Yes


Reviewed sepsis parameters & appropriate labs ordered?: Yes


System Inflammatory Response Syndrome: Not Applicable


Sepsis Protocol: 


For patient's 13 years and over:





Temp is 96.8 and below  and greater


Pulse >90 BPM


Resp >20/minute


Acutely Altered Mental Status





Are patient's symptoms suggestive of a new infection, such as:


   -Pneumonia


   -Skin, Soft Tissue


   -Endocarditis


   -UTI


   -Bone, Joint Infection


   -Implantable Device


   -Acute Abdominal Infection


   -Wound Infection


   -Meningitis


   -Blood Stream Catheter Infection


   -Unknown








Respiratory Complaint Exam





- Respiratory Complaint/Exam


Onset/Duration: 24hrs


Symptoms Are: Still present


Timing: Constant


Initial Severity: Mild


Current Severity: Moderate


Location: Chest


Character: Reports: Productive cough


Aggravating: Reports: URI


Alleviating: Reports: Bronchodilators


Associated Signs and Symptoms: Reports: Rapid breathing, Dyspnea, Fever, 

Chills.  Denies: Chest pain, Pleuritic chest pain, Wheezing, Hemoptysis, 

Dizziness, Calf pain, Calf swelling, Edema


History of Healthcare-Acquired Pneumonia: Admit w/in last 30 days


Pseudomonas Risk Factors: Reports: Chronic Lung Disease


Home Oxygen Use: Yes


Recent Stress Test: No


Recent Echo/LV Function: No


Current Antibiotic Use: No


Current Asthma Medication Use: No


Respiratory Distress: None


Inadequate Respiratory Effort: No


Dysphagia Present: No


Stridor Present: No


JVD Present: No


Accessory Muscle Use: No


Retractions: Not Present


Diminished Breath Sounds: No


Sinus Tenderness: None


Grunting Respirations: No


Kussmaul Respirations: No


Differential Diagnoses: Pneumonia


Non-Traumatic Chest Pain Syncope: EKG Performed





Review of Systems





- Review Of Systems


Constitutional: Reports: Chills, Fever, Loss of appetite


Eyes: Reports: No symptoms


Ears, Nose, Mouth, Throat: Reports: No symptoms


Respiratory: Reports: Cough, Short of air


Cardiac: Reports: No symptoms


GI: Reports: No symptoms


: Reports: No symptoms


Musculoskeletal: Reports: No symptoms


Skin: Reports: No symptoms


Neurological: Reports: No symptoms


Endocrine: Reports: No symptoms


Hematologic/Lymphatic: Reports: No symptoms


All Other Systems: Reviewed and Negative





Past Medical History





- Past Medical History


Previously Healthy: No


Endocrine: Reports: Unknown


Cardiovascular: Reports: Unknown


Respiratory: Reports: Unknown


Hematological: Reports: Unknown


Gastrointestinal: Reports: Unknown


Genitourinary: Reports: Unknown


Neuro/Psych: Reports: Unknown


Musculoskeletal: Reports: Unknown


Cancer: Reports: Unknown


Last Menstrual Period: hyst -





- Surgical History


General Surgical History: Reports: Unknown





- Family History


Family History: Reports: Unknown





- Social History


Smoking Status: Current some day smoker


Hx Substance Use: No


Alcohol Screening: None





- Immunizations


Tetanus Shot up to Date: No (unsure)





Physical Exam





- Physical Exam


Appearance: Ill-appearing


Eyes: MIKE


ENT: Ears normal


Neck: Supple


Respiratory: Crackles, Rhonchi


Cardiovascular: RRR


GI/: Soft


Musculoskeletal: Normal strength


Skin: Warm


Neurological: Sensation intact


Psychiatric: Affect appropriate, Mood appropriate





Interpretation





- Radiology Interpretation


Radiology Interpretation By: Radiologist


Radiology Results: Positive


Exam Interpreted: CT Scan





- EKG Interpretation


Time of EKG #1: 23:28


Rate: Normal


Rhythm: Sinus


Ectopy: None


Axis: NL


ST Segment: Normal





Physician Notification





- Case Discussed


Physician Notified: dr bah


Time of Notification: 23:28





Critical Care Note





- Critical Care Note


Total Time (mins): 0





Course





- Course


Hematology/Chemistry: 


 05/04/18 22:20





 05/04/18 22:20


Orders, Labs, Meds: 


Lab Review











  05/04/18 05/04/18 05/04/18





  22:04 22:20 22:20


 


WBC   14.91 H 


 


RBC   3.53 L 


 


Hgb   11.7 L 


 


Hct   35.2 L 


 


MCV   99.7 H 


 


MCH   33.1 H 


 


MCHC   33.2 


 


RDW Coeff of Federico   19.5 H 


 


Plt Count   209 


 


Immature Gran % (Auto)   4.2 


 


Neut % (Auto)   79.4 


 


Lymph % (Auto)   9.2 L 


 


Mono % (Auto)   6.8 


 


Eos % (Auto)   0.2 


 


Baso % (Auto)   0.2 


 


Immature Gran # (Auto)   0.6 


 


Neut # (Auto)   11.8 H 


 


Lymph # (Auto)   1.4 


 


Mono # (Auto)   1.0 


 


Eos # (Auto)   0.0 


 


Baso # (Auto)   0.0 


 


Puncture Site  Lrad  


 


O2 Saturation  94.0 L  


 


ABG pH  7.44  


 


ABG pCO2  37.3  


 


ABG pO2  69.0 L  


 


ABG HCO3  25.3  


 


ABG Total CO2  26  


 


ABG Base Excess  1  


 


Onur Test  +  


 


FiO2 %  21.0  


 


Sodium    129 L


 


Potassium    4.0


 


Chloride    95 L


 


Carbon Dioxide    21 L


 


Anion Gap    17.0


 


BUN    18


 


Creatinine    0.77


 


Estimated GFR (MDRD)    73.00


 


BUN/Creatinine Ratio    23.37


 


Glucose    128 H


 


Lactic Acid   


 


Calcium    8.9


 


Total Bilirubin    0.7


 


AST    19


 


ALT    25


 


Alkaline Phosphatase    62


 


Total Protein    6.7


 


Albumin    2.5 L


 


Globulin    4.2


 


Albumin/Globulin Ratio    0.60


 


Procalcitonin   














  05/04/18 05/04/18





  22:20 22:20


 


WBC  


 


RBC  


 


Hgb  


 


Hct  


 


MCV  


 


MCH  


 


MCHC  


 


RDW Coeff of Federico  


 


Plt Count  


 


Immature Gran % (Auto)  


 


Neut % (Auto)  


 


Lymph % (Auto)  


 


Mono % (Auto)  


 


Eos % (Auto)  


 


Baso % (Auto)  


 


Immature Gran # (Auto)  


 


Neut # (Auto)  


 


Lymph # (Auto)  


 


Mono # (Auto)  


 


Eos # (Auto)  


 


Baso # (Auto)  


 


Puncture Site  


 


O2 Saturation  


 


ABG pH  


 


ABG pCO2  


 


ABG pO2  


 


ABG HCO3  


 


ABG Total CO2  


 


ABG Base Excess  


 


Onur Test  


 


FiO2 %  


 


Sodium  


 


Potassium  


 


Chloride  


 


Carbon Dioxide  


 


Anion Gap  


 


BUN  


 


Creatinine  


 


Estimated GFR (MDRD)  


 


BUN/Creatinine Ratio  


 


Glucose  


 


Lactic Acid  10.7 


 


Calcium  


 


Total Bilirubin  


 


AST  


 


ALT  


 


Alkaline Phosphatase  


 


Total Protein  


 


Albumin  


 


Globulin  


 


Albumin/Globulin Ratio  


 


Procalcitonin   0.15








Orders











 Category Date Time Status


 


 ABG DRAW REQUEST Stat CARDIO  05/04/18 22:05 Completed


 


 EKG-(ED ONLY) Stat CARDIO  05/04/18 22:04 Completed


 


 IV [ED IV/MEDIPORT/POWERPORT] .ONCE EMERGENCY  05/04/18 22:05 Active


 


 ABG Stat LAB  05/04/18 22:04 Completed


 


 BLOOD CULTURE (ED ONLY) Stat LAB  05/04/18 22:20 Received


 


 CBC W/ AUTO DIFF Stat LAB  05/04/18 22:20 Completed


 


 COMPREHENSIVE METABOLIC PANEL Stat LAB  05/04/18 22:20 Completed


 


 LACTIC ACID Stat LAB  05/04/18 22:20 Completed


 


 PROCALCITONIN Stat LAB  05/04/18 22:20 Completed


 


 SPUTUM CULTURE Stat LAB  05/04/18 22:24 Received


 


 0.9 % Sodium Chloride [Saline Flush] MEDS  05/04/18 22:05 Ordered





 1 syr IVF PRN PRN   


 


 Piperacillin Sodium/Tazobactam [Zosyn 3.375 gm] 3.375 MEDS  05/04/18 22:06 

Discontinued





 gm   





 0.9 % Sodium Chloride [Sodium Chloride] 50 ml   





 IV ONCE   


 


 CT CHEST W/O CONTRAST Stat RADS  05/04/18 22:06 Completed








Medications











Generic Name Dose Route Start Last Admin





  Trade Name Iza  PRN Reason Stop Dose Admin


 


Sodium Chloride  1 syr  05/04/18 22:05  





  Saline Flush  IVF   





  PRN PRN   





  To flush IV   





     





     





     














Discontinued Medications














Generic Name Dose Route Start Last Admin





  Trade Name Iza  PRN Reason Stop Dose Admin


 


Piperacillin Sod/Tazobactam  50 mls @ 50 mls/hr  05/04/18 22:06  05/04/18 22:25





  Sod 3.375 gm/ Sodium Chloride  IV  05/04/18 23:05  50 mls/hr





  ONCE STA   Administration





     





     





     





     











Vital Signs: 


 











  Temp Pulse Resp BP Pulse Ox


 


 05/04/18 21:39  102.1 F H  75  24  142/92 H  92 L














Departure





- Departure


Time of Disposition: 23:28


Disposition: ADMITTED AS INPATIENT


Discharge Problem: 


Pneumonia


Qualifiers:


 Pneumonia type: due to unspecified organism Laterality: unspecified laterality 

Lung location: unspecified part of lung Qualified Code(s): J18.9 - Pneumonia, 

unspecified organism





Instructions:  Pneumonitis (ED)


Condition: Good


Pt referred to PMD for follow-up: Yes


IPMP verified?: No


Allergies/Adverse Reactions: 


Allergies





No Known Allergies Allergy (Verified 05/04/18 21:52)


 








Home Medications: 


Ambulatory Orders





Alprazolam 0.5 mg PO TID PRN 02/24/17 


Aspirin [Aspirin EC] 81 mg PO DAILY 02/24/17 


Clopidogrel Bisulfate [Clopidogrel] 75 mg PO DAILY 02/24/17 


Fenofibrate 160 mg PO DAILY 02/24/17 


Ferrous Sulfate 325 mg PO BID 02/24/17 


Lisinopril 20 mg PO BID 02/24/17 


Simvastatin [Zocor] 40 mg PO BEDTIME 02/24/17 


Calcium Carbonate/Vitamin D3 [Calcium 600 + Vit D Tablet] 1 each PO DAILY 02/25/ 17 


Fish Oil/Dha/Epa [Fish Oil 1,200 mg Fish Oil] 1 each PO TID 02/25/17 


Hydrocodone/Acetaminophen [Hydrocodon-Acetaminoph 7.5-325] 1 each PO TID PRN 02/ 25/17 


Pantoprazole Sodium [Protonix] 40 mg PO BIDAC 02/25/17 


Travoprost Opth [Travatan Z] 1 drop OP BEDTIME 02/25/17 


Dorzolamide HCl/Timolol Maleat [Dorzolamide-Timolol Eye Drops] 1 drop EACHEYE 

BID 03/28/17 


Escitalopram Oxalate [Lexapro] 10 mg PO DAILY 03/28/17 


Alendronate Sodium [Fosamax] 70 mg PO WEEKLY 04/19/18 


Cimetidine 800 mg PO BID 04/19/18 


Guaifenesin [Mucinex] 600 mg PO Q12H 04/19/18 


Ondansetron HCl [Zofran] 8 mg PO QID 04/19/18 


Hydrocodone/Chlorphen Polis [Tussionex] 5 ml PO Q12H PRN #50 disp.syrin 04/23/ 18 


Ipratropium/Albuterol Neb [Duoneb] 1 vial NEB RTQ6H #120 vial.neb 04/23/18 


Ipratropium/Albuterol Sulfate [Combivent Respimat Inhal Spray] 2 spray IH BID 

PRN #1 aero 04/23/18 


Prednisone 20 mg PO BID #10 tablet 04/23/18 








Disposition Discussed With: Patient, Family

## 2018-05-05 RX ADMIN — SODIUM CHLORIDE SCH MLS/HR: 9 INJECTION, SOLUTION INTRAVENOUS at 07:57

## 2018-05-05 RX ADMIN — METHYLPREDNISOLONE SODIUM SUCCINATE SCH MG: 40 INJECTION, POWDER, FOR SOLUTION INTRAMUSCULAR; INTRAVENOUS at 00:50

## 2018-05-05 RX ADMIN — SODIUM CHLORIDE SCH MLS/HR: 9 INJECTION, SOLUTION INTRAVENOUS at 23:40

## 2018-05-05 RX ADMIN — TRAVOPROST SCH DROP: 0.04 SOLUTION/ DROPS OPHTHALMIC at 20:33

## 2018-05-05 RX ADMIN — ALPRAZOLAM PRN MG: 0.5 TABLET ORAL at 19:39

## 2018-05-05 RX ADMIN — ESCITALOPRAM SCH MG: 10 TABLET, FILM COATED ORAL at 08:38

## 2018-05-05 RX ADMIN — SIMVASTATIN SCH MG: 40 TABLET, FILM COATED ORAL at 20:33

## 2018-05-05 RX ADMIN — ONDANSETRON HYDROCHLORIDE SCH MG: 4 TABLET, FILM COATED ORAL at 20:33

## 2018-05-05 RX ADMIN — ASPIRIN SCH MG: 81 TABLET, COATED ORAL at 08:38

## 2018-05-05 RX ADMIN — ONDANSETRON HYDROCHLORIDE SCH MG: 4 TABLET, FILM COATED ORAL at 12:07

## 2018-05-05 RX ADMIN — FERROUS SULFATE TAB EC 324 MG (65 MG FE EQUIVALENT) SCH MG: 324 (65 FE) TABLET DELAYED RESPONSE at 08:38

## 2018-05-05 RX ADMIN — PANTOPRAZOLE SODIUM SCH MG: 40 TABLET, DELAYED RELEASE ORAL at 17:12

## 2018-05-05 RX ADMIN — SODIUM CHLORIDE SCH MLS/HR: 0.9 INJECTION, SOLUTION INTRAVENOUS at 00:09

## 2018-05-05 RX ADMIN — CIMETIDINE SCH MG: 400 TABLET, FILM COATED ORAL at 20:33

## 2018-05-05 RX ADMIN — VANCOMYCIN HYDROCHLORIDE SCH MLS/HR: 1 INJECTION, POWDER, LYOPHILIZED, FOR SOLUTION INTRAVENOUS at 08:37

## 2018-05-05 RX ADMIN — IPRATROPIUM BROMIDE AND ALBUTEROL SULFATE SCH VIAL: .5; 3 SOLUTION RESPIRATORY (INHALATION) at 05:02

## 2018-05-05 RX ADMIN — IPRATROPIUM BROMIDE AND ALBUTEROL SULFATE SCH VIAL: .5; 3 SOLUTION RESPIRATORY (INHALATION) at 21:45

## 2018-05-05 RX ADMIN — METHYLPREDNISOLONE SODIUM SUCCINATE SCH MG: 40 INJECTION, POWDER, FOR SOLUTION INTRAMUSCULAR; INTRAVENOUS at 08:39

## 2018-05-05 RX ADMIN — HYDROCODONE BITARTRATE AND ACETAMINOPHEN PRN TAB: 7.5; 325 TABLET ORAL at 08:45

## 2018-05-05 RX ADMIN — CIMETIDINE SCH MG: 400 TABLET, FILM COATED ORAL at 08:37

## 2018-05-05 RX ADMIN — ONDANSETRON HYDROCHLORIDE SCH MG: 4 TABLET, FILM COATED ORAL at 18:06

## 2018-05-05 RX ADMIN — IPRATROPIUM BROMIDE AND ALBUTEROL SULFATE SCH VIAL: .5; 3 SOLUTION RESPIRATORY (INHALATION) at 11:17

## 2018-05-05 RX ADMIN — SODIUM CHLORIDE SCH MLS/HR: 9 INJECTION, SOLUTION INTRAVENOUS at 18:05

## 2018-05-05 RX ADMIN — ONDANSETRON HYDROCHLORIDE SCH MG: 4 TABLET, FILM COATED ORAL at 08:37

## 2018-05-05 RX ADMIN — SODIUM CHLORIDE SCH MLS/HR: 9 INJECTION, SOLUTION INTRAVENOUS at 12:07

## 2018-05-05 RX ADMIN — LISINOPRIL SCH MG: 10 TABLET ORAL at 20:33

## 2018-05-05 RX ADMIN — CLOPIDOGREL SCH MG: 75 TABLET, FILM COATED ORAL at 08:38

## 2018-05-05 RX ADMIN — DORZOLAMIDE HYDROCHLORIDE AND TIMOLOL MALEATE SCH: 20; 5 SOLUTION/ DROPS OPHTHALMIC at 20:34

## 2018-05-05 RX ADMIN — GUAIFENESIN SCH MG: 600 TABLET, EXTENDED RELEASE ORAL at 08:38

## 2018-05-05 RX ADMIN — IPRATROPIUM BROMIDE AND ALBUTEROL SULFATE SCH VIAL: .5; 3 SOLUTION RESPIRATORY (INHALATION) at 00:12

## 2018-05-05 RX ADMIN — FENOFIBRATE SCH MG: 160 TABLET, FILM COATED ORAL at 08:37

## 2018-05-05 RX ADMIN — METHYLPREDNISOLONE SODIUM SUCCINATE SCH MG: 40 INJECTION, POWDER, FOR SOLUTION INTRAMUSCULAR; INTRAVENOUS at 20:32

## 2018-05-05 RX ADMIN — PANTOPRAZOLE SODIUM SCH MG: 40 TABLET, DELAYED RELEASE ORAL at 07:58

## 2018-05-05 RX ADMIN — SODIUM CHLORIDE SCH MLS/HR: 0.9 INJECTION, SOLUTION INTRAVENOUS at 19:40

## 2018-05-05 RX ADMIN — LISINOPRIL SCH MG: 10 TABLET ORAL at 08:37

## 2018-05-05 RX ADMIN — SODIUM CHLORIDE SCH: 9 INJECTION, SOLUTION INTRAVENOUS at 00:25

## 2018-05-05 RX ADMIN — FERROUS SULFATE TAB EC 324 MG (65 MG FE EQUIVALENT) SCH MG: 324 (65 FE) TABLET DELAYED RESPONSE at 20:33

## 2018-05-05 RX ADMIN — GUAIFENESIN SCH MG: 600 TABLET, EXTENDED RELEASE ORAL at 20:33

## 2018-05-05 RX ADMIN — IPRATROPIUM BROMIDE AND ALBUTEROL SULFATE SCH VIAL: .5; 3 SOLUTION RESPIRATORY (INHALATION) at 17:06

## 2018-05-05 RX ADMIN — HYDROCODONE BITARTRATE AND ACETAMINOPHEN PRN TAB: 7.5; 325 TABLET ORAL at 22:46

## 2018-05-05 RX ADMIN — DORZOLAMIDE HYDROCHLORIDE AND TIMOLOL MALEATE SCH: 20; 5 SOLUTION/ DROPS OPHTHALMIC at 08:45

## 2018-05-06 RX ADMIN — LISINOPRIL SCH MG: 10 TABLET ORAL at 20:13

## 2018-05-06 RX ADMIN — GUAIFENESIN SCH MG: 600 TABLET, EXTENDED RELEASE ORAL at 20:13

## 2018-05-06 RX ADMIN — METHYLPREDNISOLONE SODIUM SUCCINATE SCH MG: 40 INJECTION, POWDER, FOR SOLUTION INTRAMUSCULAR; INTRAVENOUS at 21:06

## 2018-05-06 RX ADMIN — ESCITALOPRAM SCH MG: 10 TABLET, FILM COATED ORAL at 09:11

## 2018-05-06 RX ADMIN — PANTOPRAZOLE SODIUM SCH MG: 40 TABLET, DELAYED RELEASE ORAL at 05:32

## 2018-05-06 RX ADMIN — ALPRAZOLAM PRN MG: 0.5 TABLET ORAL at 21:37

## 2018-05-06 RX ADMIN — ASPIRIN SCH MG: 81 TABLET, COATED ORAL at 09:11

## 2018-05-06 RX ADMIN — FERROUS SULFATE TAB EC 324 MG (65 MG FE EQUIVALENT) SCH MG: 324 (65 FE) TABLET DELAYED RESPONSE at 20:12

## 2018-05-06 RX ADMIN — Medication SCH SYR: at 20:12

## 2018-05-06 RX ADMIN — IPRATROPIUM BROMIDE AND ALBUTEROL SULFATE SCH VIAL: .5; 3 SOLUTION RESPIRATORY (INHALATION) at 22:44

## 2018-05-06 RX ADMIN — ONDANSETRON HYDROCHLORIDE SCH MG: 4 TABLET, FILM COATED ORAL at 20:12

## 2018-05-06 RX ADMIN — FENOFIBRATE SCH MG: 160 TABLET, FILM COATED ORAL at 09:12

## 2018-05-06 RX ADMIN — ONDANSETRON HYDROCHLORIDE SCH MG: 4 TABLET, FILM COATED ORAL at 09:12

## 2018-05-06 RX ADMIN — IPRATROPIUM BROMIDE AND ALBUTEROL SULFATE SCH VIAL: .5; 3 SOLUTION RESPIRATORY (INHALATION) at 11:24

## 2018-05-06 RX ADMIN — SIMVASTATIN SCH MG: 40 TABLET, FILM COATED ORAL at 20:12

## 2018-05-06 RX ADMIN — DORZOLAMIDE HYDROCHLORIDE AND TIMOLOL MALEATE SCH: 20; 5 SOLUTION/ DROPS OPHTHALMIC at 09:12

## 2018-05-06 RX ADMIN — CLOPIDOGREL SCH MG: 75 TABLET, FILM COATED ORAL at 09:12

## 2018-05-06 RX ADMIN — VANCOMYCIN HYDROCHLORIDE SCH MLS/HR: 1 INJECTION, POWDER, LYOPHILIZED, FOR SOLUTION INTRAVENOUS at 09:12

## 2018-05-06 RX ADMIN — TRAVOPROST SCH DROP: 0.04 SOLUTION/ DROPS OPHTHALMIC at 20:11

## 2018-05-06 RX ADMIN — SODIUM CHLORIDE SCH MLS/HR: 9 INJECTION, SOLUTION INTRAVENOUS at 05:26

## 2018-05-06 RX ADMIN — ONDANSETRON HYDROCHLORIDE SCH MG: 4 TABLET, FILM COATED ORAL at 12:37

## 2018-05-06 RX ADMIN — CIMETIDINE SCH MG: 400 TABLET, FILM COATED ORAL at 09:12

## 2018-05-06 RX ADMIN — CIMETIDINE SCH MG: 400 TABLET, FILM COATED ORAL at 20:12

## 2018-05-06 RX ADMIN — SODIUM CHLORIDE SCH MLS/HR: 9 INJECTION, SOLUTION INTRAVENOUS at 17:39

## 2018-05-06 RX ADMIN — PANTOPRAZOLE SODIUM SCH MG: 40 TABLET, DELAYED RELEASE ORAL at 16:53

## 2018-05-06 RX ADMIN — GUAIFENESIN SCH MG: 600 TABLET, EXTENDED RELEASE ORAL at 09:11

## 2018-05-06 RX ADMIN — SODIUM CHLORIDE SCH MLS/HR: 9 INJECTION, SOLUTION INTRAVENOUS at 11:41

## 2018-05-06 RX ADMIN — Medication SCH: at 13:44

## 2018-05-06 RX ADMIN — HYDROCODONE BITARTRATE AND ACETAMINOPHEN PRN TAB: 7.5; 325 TABLET ORAL at 09:19

## 2018-05-06 RX ADMIN — IPRATROPIUM BROMIDE AND ALBUTEROL SULFATE SCH VIAL: .5; 3 SOLUTION RESPIRATORY (INHALATION) at 04:47

## 2018-05-06 RX ADMIN — HYDROCODONE BITARTRATE AND ACETAMINOPHEN PRN TAB: 7.5; 325 TABLET ORAL at 20:12

## 2018-05-06 RX ADMIN — DORZOLAMIDE HYDROCHLORIDE AND TIMOLOL MALEATE SCH: 20; 5 SOLUTION/ DROPS OPHTHALMIC at 20:11

## 2018-05-06 RX ADMIN — ONDANSETRON HYDROCHLORIDE SCH MG: 4 TABLET, FILM COATED ORAL at 16:53

## 2018-05-06 RX ADMIN — METHYLPREDNISOLONE SODIUM SUCCINATE SCH MG: 40 INJECTION, POWDER, FOR SOLUTION INTRAMUSCULAR; INTRAVENOUS at 09:12

## 2018-05-06 RX ADMIN — IPRATROPIUM BROMIDE AND ALBUTEROL SULFATE SCH VIAL: .5; 3 SOLUTION RESPIRATORY (INHALATION) at 18:45

## 2018-05-06 RX ADMIN — FERROUS SULFATE TAB EC 324 MG (65 MG FE EQUIVALENT) SCH MG: 324 (65 FE) TABLET DELAYED RESPONSE at 09:11

## 2018-05-06 RX ADMIN — LISINOPRIL SCH MG: 10 TABLET ORAL at 09:12

## 2018-05-07 RX ADMIN — ESCITALOPRAM SCH MG: 10 TABLET, FILM COATED ORAL at 09:02

## 2018-05-07 RX ADMIN — METHYLPREDNISOLONE SODIUM SUCCINATE SCH MG: 40 INJECTION, POWDER, FOR SOLUTION INTRAMUSCULAR; INTRAVENOUS at 13:18

## 2018-05-07 RX ADMIN — PANTOPRAZOLE SODIUM SCH MG: 40 TABLET, DELAYED RELEASE ORAL at 16:46

## 2018-05-07 RX ADMIN — SODIUM CHLORIDE SCH MLS/HR: 9 INJECTION, SOLUTION INTRAVENOUS at 00:35

## 2018-05-07 RX ADMIN — FERROUS SULFATE TAB EC 324 MG (65 MG FE EQUIVALENT) SCH MG: 324 (65 FE) TABLET DELAYED RESPONSE at 09:02

## 2018-05-07 RX ADMIN — SIMVASTATIN SCH MG: 40 TABLET, FILM COATED ORAL at 20:50

## 2018-05-07 RX ADMIN — METHYLPREDNISOLONE SODIUM SUCCINATE SCH: 40 INJECTION, POWDER, FOR SOLUTION INTRAMUSCULAR; INTRAVENOUS at 09:27

## 2018-05-07 RX ADMIN — LISINOPRIL SCH MG: 10 TABLET ORAL at 20:50

## 2018-05-07 RX ADMIN — TRAVOPROST SCH DROP: 0.04 SOLUTION/ DROPS OPHTHALMIC at 20:50

## 2018-05-07 RX ADMIN — DORZOLAMIDE HYDROCHLORIDE AND TIMOLOL MALEATE SCH: 20; 5 SOLUTION/ DROPS OPHTHALMIC at 20:50

## 2018-05-07 RX ADMIN — FENOFIBRATE SCH MG: 160 TABLET, FILM COATED ORAL at 09:03

## 2018-05-07 RX ADMIN — IPRATROPIUM BROMIDE AND ALBUTEROL SULFATE SCH VIAL: .5; 3 SOLUTION RESPIRATORY (INHALATION) at 10:10

## 2018-05-07 RX ADMIN — ASPIRIN SCH MG: 81 TABLET, COATED ORAL at 09:03

## 2018-05-07 RX ADMIN — ONDANSETRON HYDROCHLORIDE SCH MG: 4 TABLET, FILM COATED ORAL at 16:45

## 2018-05-07 RX ADMIN — IPRATROPIUM BROMIDE AND ALBUTEROL SULFATE SCH VIAL: .5; 3 SOLUTION RESPIRATORY (INHALATION) at 05:06

## 2018-05-07 RX ADMIN — FERROUS SULFATE TAB EC 324 MG (65 MG FE EQUIVALENT) SCH MG: 324 (65 FE) TABLET DELAYED RESPONSE at 20:50

## 2018-05-07 RX ADMIN — CIMETIDINE SCH MG: 400 TABLET, FILM COATED ORAL at 20:50

## 2018-05-07 RX ADMIN — IPRATROPIUM BROMIDE AND ALBUTEROL SULFATE SCH VIAL: .5; 3 SOLUTION RESPIRATORY (INHALATION) at 14:17

## 2018-05-07 RX ADMIN — ONDANSETRON HYDROCHLORIDE SCH MG: 4 TABLET, FILM COATED ORAL at 20:51

## 2018-05-07 RX ADMIN — IPRATROPIUM BROMIDE AND ALBUTEROL SULFATE SCH VIAL: .5; 3 SOLUTION RESPIRATORY (INHALATION) at 20:25

## 2018-05-07 RX ADMIN — HYDROCODONE BITARTRATE AND ACETAMINOPHEN PRN TAB: 7.5; 325 TABLET ORAL at 14:51

## 2018-05-07 RX ADMIN — LISINOPRIL SCH MG: 10 TABLET ORAL at 09:02

## 2018-05-07 RX ADMIN — CLOPIDOGREL SCH MG: 75 TABLET, FILM COATED ORAL at 09:02

## 2018-05-07 RX ADMIN — Medication SCH SYR: at 05:33

## 2018-05-07 RX ADMIN — Medication SCH SYR: at 13:18

## 2018-05-07 RX ADMIN — VANCOMYCIN HYDROCHLORIDE SCH MLS/HR: 1 INJECTION, POWDER, LYOPHILIZED, FOR SOLUTION INTRAVENOUS at 09:01

## 2018-05-07 RX ADMIN — ALPRAZOLAM PRN MG: 0.5 TABLET ORAL at 20:57

## 2018-05-07 RX ADMIN — METHYLPREDNISOLONE SODIUM SUCCINATE SCH MG: 40 INJECTION, POWDER, FOR SOLUTION INTRAMUSCULAR; INTRAVENOUS at 20:45

## 2018-05-07 RX ADMIN — SODIUM CHLORIDE SCH: 0.9 INJECTION, SOLUTION INTRAVENOUS at 06:59

## 2018-05-07 RX ADMIN — DORZOLAMIDE HYDROCHLORIDE AND TIMOLOL MALEATE SCH: 20; 5 SOLUTION/ DROPS OPHTHALMIC at 08:59

## 2018-05-07 RX ADMIN — ONDANSETRON HYDROCHLORIDE SCH MG: 4 TABLET, FILM COATED ORAL at 09:03

## 2018-05-07 RX ADMIN — SODIUM CHLORIDE SCH MLS/HR: 9 INJECTION, SOLUTION INTRAVENOUS at 17:15

## 2018-05-07 RX ADMIN — GUAIFENESIN SCH MG: 600 TABLET, EXTENDED RELEASE ORAL at 09:02

## 2018-05-07 RX ADMIN — CIMETIDINE SCH MG: 400 TABLET, FILM COATED ORAL at 09:02

## 2018-05-07 RX ADMIN — HYDROCODONE BITARTRATE AND ACETAMINOPHEN PRN TAB: 7.5; 325 TABLET ORAL at 23:08

## 2018-05-07 RX ADMIN — Medication SCH SYR: at 20:52

## 2018-05-07 RX ADMIN — SODIUM CHLORIDE SCH MLS/HR: 9 INJECTION, SOLUTION INTRAVENOUS at 23:28

## 2018-05-07 RX ADMIN — SODIUM CHLORIDE SCH MLS/HR: 9 INJECTION, SOLUTION INTRAVENOUS at 12:05

## 2018-05-07 RX ADMIN — SODIUM CHLORIDE SCH MLS/HR: 9 INJECTION, SOLUTION INTRAVENOUS at 05:33

## 2018-05-07 RX ADMIN — GUAIFENESIN SCH MG: 600 TABLET, EXTENDED RELEASE ORAL at 20:51

## 2018-05-07 RX ADMIN — HYDROCODONE BITARTRATE AND ACETAMINOPHEN PRN TAB: 7.5; 325 TABLET ORAL at 05:43

## 2018-05-07 RX ADMIN — PANTOPRAZOLE SODIUM SCH MG: 40 TABLET, DELAYED RELEASE ORAL at 05:34

## 2018-05-07 RX ADMIN — ONDANSETRON HYDROCHLORIDE SCH MG: 4 TABLET, FILM COATED ORAL at 12:05

## 2018-05-07 NOTE — HP
DATE OF SERVICE:  05/05/18



REASON FOR HOSPITALIZATION/HISTORY OF PRESENT ILLNESS:

72 year old white female hospitalized with pneumonia. The patient has C of the 
lung and severe chronic lung disease. CT scan of the chest shows pneumonitis. 
The patient's original complaints were shortness of breath with cough and 
congestion with fever and chills. The patient's fever was 102 in the emergency 
room. 



PAST MEDICAL HISTORY/PAST SURGICAL HISTORY: 

Anxiety syndrome 

C of the lung 

Severe chronic lung disease with history of smoking 

Hypertension 

Dyslipidemia 

Generalized osteoarthritis 

Depression 

Reflux disease



REVIEW OF SYSTEMS:

CONSTITUTIONAL:  No night sweats.  Weakness and fatigue.  No fever or chills.

HEENT:  Eyes:  No visual changes.  No eye pain.  No eye discharge.  ENT:  No 
runny nose.  No epistaxis.  No sinus pain.  No sore throat.  No odynophagia.  
No ear pain.  No congestion.

RESPIRATORY:  Cough, Congestion.  No hemoptysis.  No shortness of breath.

CARDIOVASCULAR:  No angina symptoms. No CHF symptoms.  No atypical chest pain 
for CAD.  No palpitations. No PND. No orthopnea. Pleuritic type of pain with 
cough on the right side. 

GASTROINTESTINAL:  No abdominal pain.  No nausea or vomiting.  No diarrhea or 
constipation.  No hematemesis.  No hematochezia.  Poor appetite. 

GENITOURINARY:  No urgency.  No frequency.  No dysuria.  No hematuria.  No 
obstructive symptoms.  No discharge.  No pain.  No significant abnormal 
bleeding.

MUSCULOSKELETAL:  No musculoskeletal pain.  No joint swelling.  No arthritis. 
Generalized aches and pains. 

NEUROLOGICAL:  No headache. No neck pain. No syncope. No seizures.  No 
dizziness. 

PSYCHIATRIC:   Not anxious. No depression.  No suicidal thoughts. No homicidal 
thoughts. 

SKIN:  No rash.  No lesions.  No wounds.

ENDOCRINE:  No unexplained weight loss.  No weight gain. 

HEMATOLOGIC/LYMPHATIC:  No anemia.  No purpura.  No petechiae.  No prolonged or 
excessive bleeding.  No palpable lymph nodes.



PERSONAL/FAMILY/SOCIAL HISTORY:

The patient is single and lives by herself with the help of family. Nonsmoker 
at present time but used to smoke heavy, no alcohol abuse. Does all activity of 
daily living. 



MEDICATIONS: 

Alprazolam 0.5mg three times a day PRN 

Aspirin 81mg PO daily 

Plavix 75mg PO daily 

Fenofibrate 160mg daily 

Ferrous Sulfate 325 twice a day 

Lisinopril 20mg twice a day 

Simvastatin 40mg PO daily 

Hydrocodone 7.5-325 three times a day 

Protonix 40mg twice a day 

Lexapro 10mg PO daily 

Fosamax 70mg Q weekly 

Tagamet 800 twice a day 

Tussionex one teaspoon twice a day 

DUO NEB four times PRN 

Combivent/Respimat one spray once a day 

Prednisone 20mg twice a day 



ALLERGIES:

None 



PHYSICAL EXAMINATION: 

GENERAL:  The patient is oriented to time, place and person 

VITAL SIGNS:  Temperature 98, pulse 76, respiratory rate 18, blood pressure 128/
72 and pulse ox 92%. 

HEENT:  Head normocephalic, atraumatic.  Eyes: Extraocular muscles are intact.  
Pupils are equal, round and reactive to light and accommodation.  Ears:  No 
lesions.  Nose appeared normal. Throat: No exudate or erythema.

NECK: Supple. No JVP, no carotid bruit.  No lymphadenopathy or thyromegaly. 

LUNGS: Decreased breath sounds with mild wheeze. Air entry is acceptable with 
decreased breath sounds on left side. Clear to auscultation.  Percussion note 
normal.  Chest  symmetrical. 

HEART:  S1, S2, no S3. Grade I/VI systolic murmurs.  No cyanosis or clubbing.  
No ascites.  Pulses: Dorsalis pedis and posterior tibial pulses +1 to +2 
bilaterally. 

ABDOMEN:  Soft.  Nontender.  Bowel sounds active. No CVA tenderness. No mass 
felt. 

EXTREMITIES:  No edema.  Full range of motion of all extremities, equal. 

NEUROLOGIC:  No focal deficit. Cranial nerves II through XII are grossly 
intact.  No headache, no double vision or headache. 

SKIN: Not dry.  Intact.  Turgor - normal. Mucosa membrane dry. Patient has 
alopecia from chemo therapy. 

LYMPHATIC:  No palpable lymph nodes/no lymphedema. 

MUSCULOSKELETAL:  Normal joints with no swelling. Muscle tone is normal.



LABS:

Hgb 10.1, hct 30, WBC 12,000 normal differential, creatinine 0.8, BUN 18, 
potassium 4.2, glucose 189. ABG pO2 69, pCO2 37, pH 7.44 with 94% saturation. 



ASSESSMENT:

1. Pneumonia 

2. Severe chronic lung disease 

3. C of the lung being treated with chemotherapy and radiation 

4  Hypertension 

5. Dyslipidemia 

6. Depression 

7. Anemia 

8. History of smoking 

9. Hyperglycemia from steroid therapy 



PLAN:

1.  Give Zosyn and Vancomycin 

2.  Steroids IV 

3.  Telemetry 

4.  NEBS treatment 

5.  IV fluids 



TIME SPENT: More than 70 minutes. 
MTDD

## 2018-05-07 NOTE — PN
DATE OF SERVICE:  05/06/18



SUBJECTIVE:  

The patient was hospitalized with pneumonitis, wheezing and pneumonia with 
fever. The patient is feeling better. She is still weak. Extremely tired with 
little exertion. The patient has C of the lung treated with chemotherapy and 
radiation. The patient's condition with steroids and antibiotics has improved. 
Her appetite has improved and she is less short of breath then what she came in 
with. 



REVIEW OF SYSTEMS: 

CONSTITUTIONAL: No night sweats. No fatigue, malaise, lethargy. No fever or 
chills.

HEENT: Eyes: No visual changes. No eye pain. No eye discharge. ENT: No runny 
nose. No epistaxis. No sinus pain. No sore throat. No odynophagia. No 
congestion.

RESPIRATORY: No cough, no congestion. No hemoptysis. No shortness of breath. 

CARDIOVASCULAR: No angina symptoms. No CHF symptoms. No atypical chest pain for 
CAD. No palpitations. No orthopnea.

GASTROINTESTINAL: No abdominal pain. No nausea or vomiting. No diarrhea or 
constipation. No hematemesis. No hematochezia.

GENITOURINARY: No urgency. No frequency. No dysuria. No hematuria. No 
obstructive symptoms. No discharge. No pain. No significant abnormal bleeding.

MUSCULOSKELETAL: No musculoskeletal pain; no joint swelling. 

NEUROLOGICAL:  No headache. No neck pain. No syncope. No seizures. No dizziness.

PSYCHIATRIC: Not anxious. No depression. No suicidal thoughts. No homicidal 
thoughts.

SKIN:  No rash. No lesions. No wounds.

ENDOCRINE: No unexplained weight loss. No weight gain. 

HEMATOLOGIC/LYMPHATIC: No anemia. No purpura. No petechiae. No prolonged or 
excessive bleeding. No palpable lymph nodes.



PHYSICAL EXAMINATION: 

GENERAL:  The patient is oriented to time, place and person.

HEENT:  Head normocephalic, atraumatic.  Eyes: Extraocular muscles are intact.  
Pupils are equal, round and reactive to light and accommodation.  Ears:  No 
lesions.  Nose appeared normal. Throat: No exudate or erythema.

NECK: Supple. No JVD, no carotid bruit.  No lymphadenopathy or thyromegaly. 

LUNGS: Decreased breath sounds with mild wheeze but good air entry.  Percussion 
note normal.  Chest symmetrical. 

HEART:  S1, S2, no S3. No murmurs.  No cyanosis or clubbing.  No ascites.  
Pulses: Dorsalis pedis and posterior tibial pulses +1 to +2 both sides. 

ABDOMEN:  Soft.  Nontender.  Bowel sounds active. No CVA tenderness. No mass 
felt. 

EXTREMITIES:  No edema.  Full range of motion of all extremities, equal. 

NEUROLOGIC:  No focal deficit. Cranial nerves II through XII are grossly 
intact.  No headache, no double vision or headache. 

SKIN: Not dry.  Intact.  Turgor - normal. 

LYMPHATIC:  No palpable lymph nodes/no lymphedema. 

MUSCULOSKELETAL:  Normal joints with no swelling. Muscle tone is normal. 



ASSESSMENT:

1.  Acute pneumonitis/bronchitis seems to be more improving with steroids, 
antibiotics and NEBS 



PLAN:

1.  Continue the same management 

2.  Advised to continue followup with hematologist/oncologist specialist. 



CONDITION: Stable. 



TIME SPENT:  More than 30 minutes. 



Plan and coordination of the patient's care discussed in the presence of nurse. 
HOMERO

## 2018-05-08 RX ADMIN — SODIUM CHLORIDE SCH MLS/HR: 9 INJECTION, SOLUTION INTRAVENOUS at 05:44

## 2018-05-08 RX ADMIN — PANTOPRAZOLE SODIUM SCH MG: 40 TABLET, DELAYED RELEASE ORAL at 17:26

## 2018-05-08 RX ADMIN — METHYLPREDNISOLONE SODIUM SUCCINATE SCH MG: 40 INJECTION, POWDER, FOR SOLUTION INTRAMUSCULAR; INTRAVENOUS at 21:01

## 2018-05-08 RX ADMIN — IPRATROPIUM BROMIDE AND ALBUTEROL SULFATE SCH VIAL: .5; 3 SOLUTION RESPIRATORY (INHALATION) at 20:16

## 2018-05-08 RX ADMIN — Medication SCH SYR: at 21:01

## 2018-05-08 RX ADMIN — ONDANSETRON HYDROCHLORIDE SCH MG: 4 TABLET, FILM COATED ORAL at 17:26

## 2018-05-08 RX ADMIN — Medication SCH SYR: at 12:10

## 2018-05-08 RX ADMIN — LISINOPRIL SCH MG: 10 TABLET ORAL at 20:31

## 2018-05-08 RX ADMIN — CIMETIDINE SCH MG: 400 TABLET, FILM COATED ORAL at 08:43

## 2018-05-08 RX ADMIN — CIMETIDINE SCH MG: 400 TABLET, FILM COATED ORAL at 20:31

## 2018-05-08 RX ADMIN — DORZOLAMIDE HYDROCHLORIDE AND TIMOLOL MALEATE SCH: 20; 5 SOLUTION/ DROPS OPHTHALMIC at 20:34

## 2018-05-08 RX ADMIN — METHYLPREDNISOLONE SODIUM SUCCINATE SCH MG: 40 INJECTION, POWDER, FOR SOLUTION INTRAMUSCULAR; INTRAVENOUS at 05:02

## 2018-05-08 RX ADMIN — CIPROFLOXACIN AND DEXAMETHASONE SCH DROP: 3; 1 SUSPENSION/ DROPS AURICULAR (OTIC) at 10:18

## 2018-05-08 RX ADMIN — CIPROFLOXACIN AND DEXAMETHASONE SCH DROP: 3; 1 SUSPENSION/ DROPS AURICULAR (OTIC) at 20:32

## 2018-05-08 RX ADMIN — ONDANSETRON HYDROCHLORIDE SCH MG: 4 TABLET, FILM COATED ORAL at 12:09

## 2018-05-08 RX ADMIN — ASPIRIN SCH MG: 81 TABLET, COATED ORAL at 08:42

## 2018-05-08 RX ADMIN — IPRATROPIUM BROMIDE AND ALBUTEROL SULFATE SCH VIAL: .5; 3 SOLUTION RESPIRATORY (INHALATION) at 14:14

## 2018-05-08 RX ADMIN — SODIUM CHLORIDE SCH MLS/HR: 9 INJECTION, SOLUTION INTRAVENOUS at 17:27

## 2018-05-08 RX ADMIN — ESCITALOPRAM SCH MG: 10 TABLET, FILM COATED ORAL at 08:43

## 2018-05-08 RX ADMIN — ALPRAZOLAM PRN MG: 0.5 TABLET ORAL at 20:31

## 2018-05-08 RX ADMIN — FERROUS SULFATE TAB EC 324 MG (65 MG FE EQUIVALENT) SCH MG: 324 (65 FE) TABLET DELAYED RESPONSE at 20:31

## 2018-05-08 RX ADMIN — Medication SCH SYR: at 05:05

## 2018-05-08 RX ADMIN — GUAIFENESIN SCH MG: 600 TABLET, EXTENDED RELEASE ORAL at 08:43

## 2018-05-08 RX ADMIN — FERROUS SULFATE TAB EC 324 MG (65 MG FE EQUIVALENT) SCH MG: 324 (65 FE) TABLET DELAYED RESPONSE at 08:42

## 2018-05-08 RX ADMIN — SIMVASTATIN SCH MG: 40 TABLET, FILM COATED ORAL at 20:31

## 2018-05-08 RX ADMIN — GUAIFENESIN SCH MG: 600 TABLET, EXTENDED RELEASE ORAL at 20:31

## 2018-05-08 RX ADMIN — ALPRAZOLAM PRN MG: 0.5 TABLET ORAL at 08:43

## 2018-05-08 RX ADMIN — IPRATROPIUM BROMIDE AND ALBUTEROL SULFATE SCH VIAL: .5; 3 SOLUTION RESPIRATORY (INHALATION) at 10:06

## 2018-05-08 RX ADMIN — LISINOPRIL SCH MG: 10 TABLET ORAL at 08:43

## 2018-05-08 RX ADMIN — TRAVOPROST SCH DROP: 0.04 SOLUTION/ DROPS OPHTHALMIC at 20:32

## 2018-05-08 RX ADMIN — HYDROCODONE BITARTRATE AND ACETAMINOPHEN PRN TAB: 7.5; 325 TABLET ORAL at 17:40

## 2018-05-08 RX ADMIN — PANTOPRAZOLE SODIUM SCH MG: 40 TABLET, DELAYED RELEASE ORAL at 05:45

## 2018-05-08 RX ADMIN — CLOPIDOGREL SCH MG: 75 TABLET, FILM COATED ORAL at 08:43

## 2018-05-08 RX ADMIN — VANCOMYCIN HYDROCHLORIDE SCH MLS/HR: 1 INJECTION, POWDER, LYOPHILIZED, FOR SOLUTION INTRAVENOUS at 08:42

## 2018-05-08 RX ADMIN — ONDANSETRON HYDROCHLORIDE SCH MG: 4 TABLET, FILM COATED ORAL at 08:43

## 2018-05-08 RX ADMIN — IPRATROPIUM BROMIDE AND ALBUTEROL SULFATE SCH VIAL: .5; 3 SOLUTION RESPIRATORY (INHALATION) at 06:05

## 2018-05-08 RX ADMIN — ONDANSETRON HYDROCHLORIDE SCH MG: 4 TABLET, FILM COATED ORAL at 20:30

## 2018-05-08 RX ADMIN — SODIUM CHLORIDE SCH MLS/HR: 9 INJECTION, SOLUTION INTRAVENOUS at 23:04

## 2018-05-08 RX ADMIN — METHYLPREDNISOLONE SODIUM SUCCINATE SCH MG: 40 INJECTION, POWDER, FOR SOLUTION INTRAMUSCULAR; INTRAVENOUS at 12:53

## 2018-05-08 RX ADMIN — FENOFIBRATE SCH MG: 160 TABLET, FILM COATED ORAL at 08:43

## 2018-05-08 RX ADMIN — SODIUM CHLORIDE SCH MLS/HR: 9 INJECTION, SOLUTION INTRAVENOUS at 12:10

## 2018-05-08 RX ADMIN — DORZOLAMIDE HYDROCHLORIDE AND TIMOLOL MALEATE SCH: 20; 5 SOLUTION/ DROPS OPHTHALMIC at 08:44

## 2018-05-08 NOTE — PCM.PROG
Attending Provider: 


ATTENDING PROVIDER:


Dr. ASHLEIGH VELOZ


This patient is seen with Tami Melendez, Nurse Practitioner.





DATE OF SERVICE:  05/08/18





SUBJECTIVE: 


This 72 year old WHITE/ F was hospitalized 05/04/18.  The patient is 

sitting in the chair, alert. feeling stronger today. Shortness of breath 

improved. Will do repeat chest x-ray today. The patient is complaining of right 

ear pain. 





REVIEW OF SYSTEMS:


CONSTITUTIONAL: Weakness. No night sweats. No malaise, lethargy. No fever or 

chills.


HEENT: Eyes: No visual changes. No eye pain. No eye discharge. ENT: Ears:  

Right ear pain. No runny nose. No epistaxis. No sinus pain. No odynophagia. No 

congestion.


RESPIRATORY: Cough and congestion. No hemoptysis.  No shortness of breath.


CARDIOVASCULAR: No angina symptoms. No CHF symptoms. No atypical chest pain for 

CAD. No palpitations. No orthopnea..


GASTROINTESTINAL: No abdominal pain. No nausea or vomiting. No diarrhea or 

constipation. No hematemesis. No hematochezia.


GENITOURINARY: No urgency. No frequency. No dysuria. No hematuria. No 

obstructive symptoms. No discharge. No pain. No significant abnormal bleeding.


MUSCULOSKELETAL: No musculoskeletal pain; no joint swelling. 


NEUROLOGICAL: Awake, alert, oriented to time, place and person. No headache. No 

neck pain. No syncope. No seizures. No dizziness.


PSYCHIATRIC: Not anxious. No depression. No suicidal thoughts. No homicidal 

thoughts.


SKIN:  No rash. No lesions. No wounds.


ENDOCRINE: No unexplained weight loss. No weight gain. 


HEMATOLOGIC/LYMPHATIC: No anemia. No purpura. No petechiae. No prolonged or 

excessive bleeding. No palpable lymph nodes.





PHYSICAL EXAMINATION:


GENERAL:  The patient is awake, alert and oriented, sitting in chair in no 

distress. 


VITAL SIGNS:  Temperature 98.3 F, Pulse 94, Respiratory Rate 16, /74, 

Pulse Ox 94%


HEENT:  Head normocephalic, atraumatic.  Eyes: Extraocular muscles are intact.  

Pupils are equal, round and reactive to light and accommodation.  Ears:  right 

ear pain. No lesions.  Nose appeared normal. Throat: No exudate or erythema.


NECK: Supple. No JVD, no carotid bruit.  No lymphadenopathy or thyromegaly. 


LUNGS:  Diminished breath sounds bilaterally with wheeze on the right. Clear to 

auscultation.  Percussion note normal.  Chest symmetrical. 


HEART:  S1, S2, no S3. No murmurs.  No cyanosis or clubbing.  No ascites.  

Pulses: Dorsalis pedis and posterior tibial pulses +1 to +2 both sides. 


ABDOMEN:  Soft.  Non-tender.  Bowel sounds active. No CVA tenderness. No mass 

felt. 


EXTREMITIES:  No edema.  Full range of motion of all extremities, equal. 


NEUROLOGIC:  No focal deficit. Cranial nerves II through XII are grossly 

intact.  No headache, no double vision or headache.  


SKIN: Not dry.  Intact.  Turgor-normal. 


LYMPHATIC:  No palpable lymph nodes/no lymphedema. 


MUSCULOSKELETAL:  Normal joints with no swelling. Muscle tone is normal. 








LAB REVIEW:





 05/08/18 05:00 





 05/08/18 05:00 











05/08/18 05:00: Sodium 138, Potassium 4.3, Chloride 97 L, Carbon Dioxide 33 H, 

Anion Gap 12.3, BUN 12, Creatinine 0.82, Estimated GFR (MDRD) 68.00, BUN/

Creatinine Ratio 14.63, Glucose 132 H, Calcium 8.6, Total Bilirubin 0.3, AST 15

, ALT 20, Alkaline Phosphatase 48 L, Total Protein 5.2 L, Albumin 2.1 L, 

Globulin 3.1, Albumin/Globulin Ratio 0.68


05/08/18 05:00: WBC 6.93, RBC 2.71 L, Hgb 8.9 L, Hct 27.4 L, .1 H, MCH 

32.8 H, MCHC 32.5, RDW Coeff of Federico 19.4 H, Plt Count 215, Neutrophils % (Manual

) 93.0 H, Lymphocytes % (Manual) 2.0 L, Monocytes % (Manual) 4.0, Basophils % (

Manual) 1.0, Anisocytosis Not present





ASSESSMENT:  


1.  Right  upper and middle lobe pneumonia


2.  Right upper lobe malignancy


3.  Anemia


4.  Generalized weakness


5.  Right ear pain





PLAN:


1.  Repeat chest x-ray


2.  Ciprodex ear drops, four drops twice a day








Plan and coordination of the patient's care discussed in the presence of Case 

Manager and nurse.








CONDITION:  Stable











SCRIBED BY:


Hollie BRYANT scribed while in presence of service performed by 

Dr. Veloz/LOIS Siegel on 05/08/18 ([id number])

## 2018-05-08 NOTE — PN
DATE OF SERVICE:  05/07/18



SUBJECTIVE:  

72-year-old white female hospitalized with pneumonia. The patient has 
Pseudomonas sensitive to Zosyn. The patient's condition is improving. Her 
appetite has improved. Hydration status has improved. The patient was seen and 
examined with the nurse practitioner. 



PHYSICAL EXAMINATION: 

HEENT:  Head normocephalic, atraumatic.  Eyes: Extraocular muscles are intact.  
Pupils are equal, round and reactive to light and accommodation.  Ears:  No 
lesions.  Nose appeared normal. Throat: No exudate or erythema.

NECK: Supple. No JVD, no carotid bruit.  No lymphadenopathy or thyromegaly. 

LUNGS: Decreased breath sounds with crepitations bilaterally. Percussion note 
normal.  Chest symmetrical. 

HEART:  S1, S2, no S3. No murmurs.  No cyanosis or clubbing.  No ascites.  
Pulses: Dorsalis pedis and posterior tibial pulses +1 to +2 both sides. 

ABDOMEN:  Soft.  Nontender.  Bowel sounds active. No CVA tenderness. No mass 
felt. 

EXTREMITIES:  No edema.  Full range of motion of all extremities, equal. 

NEUROLOGIC:  No focal deficit. Cranial nerves II through XII are grossly 
intact.  No headache, no double vision or headache. 

SKIN: Not dry.  Intact.  Turgor - normal. 

LYMPHATIC:  No palpable lymph nodes/no lymphedema. 

MUSCULOSKELETAL:  Normal joints with no swelling. Muscle tone is normal. 



TIME SPENT:  More than 30 minutes. 



Plan and coordination of the patient's care discussed in the presence of nurse. 
HOMERO

## 2018-05-08 NOTE — DI
EXAM:  CHEST FRONTAL AND LATERAL VIEWS 

  

HISTORY:  Pneumonia. 

COMPARISON:  04/19/2018 

  

FINDINGS:  Heart size remains normal. There is peripheral density suggesting either loculated pleural
 fluid or other reason for pleural thickening over the right upper lobe slightly improved since previ
ous exam.  Lungs are otherwise within normal limits.  Left port catheter stable.  Moderate atheroscle
rotic disease. 

  

  

IMPRESSION: 

Slight improvement in right upper lobe density possibly related to improving pneumonia.  Other pathol
ogy in this region including neoplasia is not excluded radiographically.

## 2018-05-09 RX ADMIN — ASPIRIN SCH MG: 81 TABLET, COATED ORAL at 08:53

## 2018-05-09 RX ADMIN — FERROUS SULFATE TAB EC 324 MG (65 MG FE EQUIVALENT) SCH MG: 324 (65 FE) TABLET DELAYED RESPONSE at 08:53

## 2018-05-09 RX ADMIN — Medication SCH: at 13:57

## 2018-05-09 RX ADMIN — IPRATROPIUM BROMIDE AND ALBUTEROL SULFATE SCH VIAL: .5; 3 SOLUTION RESPIRATORY (INHALATION) at 10:23

## 2018-05-09 RX ADMIN — CLOPIDOGREL SCH MG: 75 TABLET, FILM COATED ORAL at 08:53

## 2018-05-09 RX ADMIN — ONDANSETRON HYDROCHLORIDE SCH MG: 4 TABLET, FILM COATED ORAL at 16:30

## 2018-05-09 RX ADMIN — SODIUM CHLORIDE SCH MLS/HR: 9 INJECTION, SOLUTION INTRAVENOUS at 05:19

## 2018-05-09 RX ADMIN — IPRATROPIUM BROMIDE AND ALBUTEROL SULFATE SCH VIAL: .5; 3 SOLUTION RESPIRATORY (INHALATION) at 05:38

## 2018-05-09 RX ADMIN — HYDROCODONE BITARTRATE AND ACETAMINOPHEN PRN TAB: 7.5; 325 TABLET ORAL at 16:30

## 2018-05-09 RX ADMIN — ESCITALOPRAM SCH MG: 10 TABLET, FILM COATED ORAL at 08:53

## 2018-05-09 RX ADMIN — ALPRAZOLAM PRN MG: 0.5 TABLET ORAL at 22:21

## 2018-05-09 RX ADMIN — CIMETIDINE SCH MG: 400 TABLET, FILM COATED ORAL at 21:59

## 2018-05-09 RX ADMIN — ONDANSETRON HYDROCHLORIDE SCH MG: 4 TABLET, FILM COATED ORAL at 22:00

## 2018-05-09 RX ADMIN — GUAIFENESIN SCH MG: 600 TABLET, EXTENDED RELEASE ORAL at 08:54

## 2018-05-09 RX ADMIN — METHYLPREDNISOLONE SODIUM SUCCINATE SCH MG: 40 INJECTION, POWDER, FOR SOLUTION INTRAMUSCULAR; INTRAVENOUS at 22:00

## 2018-05-09 RX ADMIN — SIMVASTATIN SCH MG: 40 TABLET, FILM COATED ORAL at 22:00

## 2018-05-09 RX ADMIN — Medication SCH SYR: at 22:01

## 2018-05-09 RX ADMIN — FENOFIBRATE SCH MG: 160 TABLET, FILM COATED ORAL at 08:54

## 2018-05-09 RX ADMIN — CIPROFLOXACIN AND DEXAMETHASONE SCH DROP: 3; 1 SUSPENSION/ DROPS AURICULAR (OTIC) at 08:50

## 2018-05-09 RX ADMIN — ONDANSETRON HYDROCHLORIDE SCH MG: 4 TABLET, FILM COATED ORAL at 08:53

## 2018-05-09 RX ADMIN — LISINOPRIL SCH MG: 10 TABLET ORAL at 22:00

## 2018-05-09 RX ADMIN — DORZOLAMIDE HYDROCHLORIDE AND TIMOLOL MALEATE SCH: 20; 5 SOLUTION/ DROPS OPHTHALMIC at 08:52

## 2018-05-09 RX ADMIN — LISINOPRIL SCH MG: 10 TABLET ORAL at 08:53

## 2018-05-09 RX ADMIN — METHYLPREDNISOLONE SODIUM SUCCINATE SCH MG: 40 INJECTION, POWDER, FOR SOLUTION INTRAMUSCULAR; INTRAVENOUS at 12:39

## 2018-05-09 RX ADMIN — IPRATROPIUM BROMIDE AND ALBUTEROL SULFATE SCH VIAL: .5; 3 SOLUTION RESPIRATORY (INHALATION) at 14:16

## 2018-05-09 RX ADMIN — SODIUM CHLORIDE SCH MLS/HR: 9 INJECTION, SOLUTION INTRAVENOUS at 18:19

## 2018-05-09 RX ADMIN — TRAVOPROST SCH DROP: 0.04 SOLUTION/ DROPS OPHTHALMIC at 21:59

## 2018-05-09 RX ADMIN — CIPROFLOXACIN AND DEXAMETHASONE SCH DROP: 3; 1 SUSPENSION/ DROPS AURICULAR (OTIC) at 22:00

## 2018-05-09 RX ADMIN — PANTOPRAZOLE SODIUM SCH MG: 40 TABLET, DELAYED RELEASE ORAL at 06:07

## 2018-05-09 RX ADMIN — IPRATROPIUM BROMIDE AND ALBUTEROL SULFATE SCH VIAL: .5; 3 SOLUTION RESPIRATORY (INHALATION) at 22:42

## 2018-05-09 RX ADMIN — FERROUS SULFATE TAB EC 324 MG (65 MG FE EQUIVALENT) SCH MG: 324 (65 FE) TABLET DELAYED RESPONSE at 22:00

## 2018-05-09 RX ADMIN — CIMETIDINE SCH MG: 400 TABLET, FILM COATED ORAL at 08:53

## 2018-05-09 RX ADMIN — METHYLPREDNISOLONE SODIUM SUCCINATE SCH MG: 40 INJECTION, POWDER, FOR SOLUTION INTRAMUSCULAR; INTRAVENOUS at 05:17

## 2018-05-09 RX ADMIN — Medication SCH SYR: at 05:17

## 2018-05-09 RX ADMIN — PANTOPRAZOLE SODIUM SCH MG: 40 TABLET, DELAYED RELEASE ORAL at 16:29

## 2018-05-09 RX ADMIN — GUAIFENESIN SCH MG: 600 TABLET, EXTENDED RELEASE ORAL at 22:00

## 2018-05-09 RX ADMIN — HYDROCODONE BITARTRATE AND ACETAMINOPHEN PRN TAB: 7.5; 325 TABLET ORAL at 05:40

## 2018-05-09 RX ADMIN — DORZOLAMIDE HYDROCHLORIDE AND TIMOLOL MALEATE SCH: 20; 5 SOLUTION/ DROPS OPHTHALMIC at 22:01

## 2018-05-09 RX ADMIN — SODIUM CHLORIDE SCH MLS/HR: 9 INJECTION, SOLUTION INTRAVENOUS at 12:39

## 2018-05-09 RX ADMIN — ONDANSETRON HYDROCHLORIDE SCH MG: 4 TABLET, FILM COATED ORAL at 12:38

## 2018-05-09 NOTE — PCM.PROG
Attending Provider: 


ATTENDING PROVIDER:


Dr. ASHLEIGH WATSON





DATE OF SERVICE:  05/09/18





SUBJECTIVE: 


This 72 year old WHITE/ F was hospitalized 05/04/18 hospitalized with 

pneumonia.  Per chest x-ray improving. Symptoms have improved, coughing is 

less.  Appetite is better. The patient is up and about. 





REVIEW OF SYSTEMS:


CONSTITUTIONAL: No night sweats. No fatigue, malaise, lethargy. No fever or 

chills.


HEENT: Eyes: No visual changes. No eye pain. No eye discharge. ENT: No runny 

nose. No epistaxis. No sinus pain. No odynophagia. No congestion.


RESPIRATORY: No cough, no congestion. No hemoptysis.  No shortness of breath.


CARDIOVASCULAR: No angina symptoms. No CHF symptoms. No atypical chest pain for 

CAD. No palpitations. No orthopnea..


GASTROINTESTINAL: No abdominal pain. No nausea or vomiting. No diarrhea or 

constipation. No hematemesis. No hematochezia.


GENITOURINARY: No urgency. No frequency. No dysuria. No hematuria. No 

obstructive symptoms. No discharge. No pain. No significant abnormal bleeding.


MUSCULOSKELETAL: No musculoskeletal pain; no joint swelling. 


NEUROLOGICAL: Awake, alert, oriented to time, place and person. No headache. No 

neck pain. No syncope. No seizures. No dizziness.


PSYCHIATRIC: Not anxious. No depression. No suicidal thoughts. No homicidal 

thoughts.


SKIN:  No rash. No lesions. No wounds.


ENDOCRINE: No unexplained weight loss. No weight gain. 


HEMATOLOGIC/LYMPHATIC: No anemia. No purpura. No petechiae. No prolonged or 

excessive bleeding. No palpable lymph nodes.





PHYSICAL EXAMINATION:


GENERAL:  The patient is awake, alert and oriented, sitting in bed in no 

distress. 


VITAL SIGNS:  Temperature 98.2 F, Pulse 99, Respiratory Rate 16, /81, 

Pulse Ox 91%


HEENT:  Head normocephalic, atraumatic.  Eyes: Extraocular muscles are intact.  

Pupils are equal, round and reactive to light and accommodation.  Ears:  No 

lesions.  Nose appeared normal. Throat: No exudate or erythema.


NECK: Supple. No JVD, no carotid bruit.  No lymphadenopathy or thyromegaly. 


LUNGS:  Few dry creps at the bases. Good air entry.  Percussion note normal.  

Chest symmetrical. 


HEART:  S1, S2, no S3. No murmurs.  No cyanosis or clubbing.  No ascites.  

Pulses: Dorsalis pedis and posterior tibial pulses +1 to +2 both sides. 


ABDOMEN:  Soft.  Non-tender.  Bowel sounds active. No CVA tenderness. No mass 

felt. 


EXTREMITIES:  No pedal edema.  Full range of motion of all extremities, equal. 


NEUROLOGIC:  No focal deficit. Cranial nerves II through XII are grossly 

intact.  No headache, no double vision or headache.  


SKIN: Warm and dry.  Intact.  Turgor-better. 


LYMPHATIC:  No palpable lymph nodes/no lymphedema. 


MUSCULOSKELETAL:  Normal joints with no swelling. Muscle tone is normal. 








LAB REVIEW:





 05/09/18 04:30 





 05/09/18 04:30 











05/09/18 04:30: Sodium 137, Potassium 4.1, Chloride 97 L, Carbon Dioxide 29, 

Anion Gap 15.1, BUN 14, Creatinine 0.91, Estimated GFR (MDRD) 60.00, BUN/

Creatinine Ratio 15.38, Glucose 141 H, Calcium 8.7, Total Bilirubin 0.4, AST 11 

L, ALT 16, Alkaline Phosphatase 44 L, Total Protein 5.3 L, Albumin 2.2 L, 

Globulin 3.1, Albumin/Globulin Ratio 0.71


05/09/18 04:30: WBC 6.59, RBC 2.80 L, Hgb 9.2 L, Hct 27.9 L, MCV 99.6 H, MCH 

32.9 H, MCHC 33.0, RDW Coeff of Federico 19.1 H, Plt Count 207, Neutrophils % (Manual

) 81.0 H, Band Neutrophils % 2.0, Lymphocytes % (Manual) 7.0 L, Monocytes % (

Manual) 3.0, Metamyelocytes % 4.0 H, Myelocytes % 2.0 H, Reactive Lymphocytes 

1.0, Anisocytosis Not present





ASSESSMENT:  


1.  PNEUMONIA CLINICALLY RESOLVING





PLAN:


1.  Continue antibiotics, steroids and nebs


2.  D/C Vancomycin 


3.  Encouraged good nutrition - regular diet


4.  Up and about





Plan and coordination of the patient's care discussed in the presence of Case 

Manager and nurse.





CONDITION:  Stable











SCRIBED BY:


IRIS CANELA  scribed while in presence of service performed by 

Dr. ASHLEIGH WATSON on 05/09/18 ([id number])

## 2018-05-10 RX ADMIN — IPRATROPIUM BROMIDE AND ALBUTEROL SULFATE SCH VIAL: .5; 3 SOLUTION RESPIRATORY (INHALATION) at 14:23

## 2018-05-10 RX ADMIN — METHYLPREDNISOLONE SODIUM SUCCINATE SCH MG: 40 INJECTION, POWDER, FOR SOLUTION INTRAMUSCULAR; INTRAVENOUS at 05:29

## 2018-05-10 RX ADMIN — SODIUM CHLORIDE SCH MLS/HR: 9 INJECTION, SOLUTION INTRAVENOUS at 05:29

## 2018-05-10 RX ADMIN — SODIUM CHLORIDE SCH MLS/HR: 9 INJECTION, SOLUTION INTRAVENOUS at 12:36

## 2018-05-10 RX ADMIN — CIMETIDINE SCH MG: 400 TABLET, FILM COATED ORAL at 22:30

## 2018-05-10 RX ADMIN — SODIUM CHLORIDE SCH MLS/HR: 9 INJECTION, SOLUTION INTRAVENOUS at 00:29

## 2018-05-10 RX ADMIN — CIPROFLOXACIN AND DEXAMETHASONE SCH DROP: 3; 1 SUSPENSION/ DROPS AURICULAR (OTIC) at 10:11

## 2018-05-10 RX ADMIN — FERROUS SULFATE TAB EC 324 MG (65 MG FE EQUIVALENT) SCH MG: 324 (65 FE) TABLET DELAYED RESPONSE at 22:30

## 2018-05-10 RX ADMIN — HYDROCODONE POLISTIREX AND CHLORPHENIRAMINE POLISTIREX SCH ML: 10; 8 SUSPENSION, EXTENDED RELEASE ORAL at 10:18

## 2018-05-10 RX ADMIN — ONDANSETRON HYDROCHLORIDE SCH MG: 4 TABLET, FILM COATED ORAL at 10:12

## 2018-05-10 RX ADMIN — FERROUS SULFATE TAB EC 324 MG (65 MG FE EQUIVALENT) SCH MG: 324 (65 FE) TABLET DELAYED RESPONSE at 10:11

## 2018-05-10 RX ADMIN — IPRATROPIUM BROMIDE AND ALBUTEROL SULFATE SCH VIAL: .5; 3 SOLUTION RESPIRATORY (INHALATION) at 22:10

## 2018-05-10 RX ADMIN — HYDROCODONE BITARTRATE AND ACETAMINOPHEN PRN TAB: 7.5; 325 TABLET ORAL at 05:39

## 2018-05-10 RX ADMIN — ALPRAZOLAM PRN MG: 0.5 TABLET ORAL at 10:19

## 2018-05-10 RX ADMIN — ONDANSETRON HYDROCHLORIDE SCH MG: 4 TABLET, FILM COATED ORAL at 17:41

## 2018-05-10 RX ADMIN — ALPRAZOLAM PRN MG: 0.5 TABLET ORAL at 22:31

## 2018-05-10 RX ADMIN — ESCITALOPRAM SCH MG: 10 TABLET, FILM COATED ORAL at 10:12

## 2018-05-10 RX ADMIN — Medication SCH SYR: at 05:29

## 2018-05-10 RX ADMIN — ONDANSETRON HYDROCHLORIDE SCH MG: 4 TABLET, FILM COATED ORAL at 12:36

## 2018-05-10 RX ADMIN — CLOPIDOGREL SCH MG: 75 TABLET, FILM COATED ORAL at 10:12

## 2018-05-10 RX ADMIN — Medication SCH SYR: at 22:28

## 2018-05-10 RX ADMIN — PREDNISONE SCH MG: 10 TABLET ORAL at 17:41

## 2018-05-10 RX ADMIN — HYDROCODONE BITARTRATE AND ACETAMINOPHEN PRN TAB: 7.5; 325 TABLET ORAL at 10:18

## 2018-05-10 RX ADMIN — HYDROCODONE POLISTIREX AND CHLORPHENIRAMINE POLISTIREX SCH ML: 10; 8 SUSPENSION, EXTENDED RELEASE ORAL at 22:28

## 2018-05-10 RX ADMIN — SIMVASTATIN SCH MG: 40 TABLET, FILM COATED ORAL at 22:31

## 2018-05-10 RX ADMIN — ONDANSETRON HYDROCHLORIDE SCH MG: 4 TABLET, FILM COATED ORAL at 22:30

## 2018-05-10 RX ADMIN — HYDROCODONE BITARTRATE AND ACETAMINOPHEN PRN TAB: 7.5; 325 TABLET ORAL at 22:30

## 2018-05-10 RX ADMIN — CIPROFLOXACIN AND DEXAMETHASONE SCH DROP: 3; 1 SUSPENSION/ DROPS AURICULAR (OTIC) at 22:27

## 2018-05-10 RX ADMIN — PANTOPRAZOLE SODIUM SCH MG: 40 TABLET, DELAYED RELEASE ORAL at 17:41

## 2018-05-10 RX ADMIN — LISINOPRIL SCH MG: 10 TABLET ORAL at 10:12

## 2018-05-10 RX ADMIN — IPRATROPIUM BROMIDE AND ALBUTEROL SULFATE SCH VIAL: .5; 3 SOLUTION RESPIRATORY (INHALATION) at 10:17

## 2018-05-10 RX ADMIN — PANTOPRAZOLE SODIUM SCH MG: 40 TABLET, DELAYED RELEASE ORAL at 05:36

## 2018-05-10 RX ADMIN — IPRATROPIUM BROMIDE AND ALBUTEROL SULFATE SCH VIAL: .5; 3 SOLUTION RESPIRATORY (INHALATION) at 04:52

## 2018-05-10 RX ADMIN — Medication SCH SYR: at 12:36

## 2018-05-10 RX ADMIN — DORZOLAMIDE HYDROCHLORIDE AND TIMOLOL MALEATE SCH: 20; 5 SOLUTION/ DROPS OPHTHALMIC at 10:11

## 2018-05-10 RX ADMIN — ASPIRIN SCH MG: 81 TABLET, COATED ORAL at 10:11

## 2018-05-10 RX ADMIN — FENOFIBRATE SCH MG: 160 TABLET, FILM COATED ORAL at 10:12

## 2018-05-10 RX ADMIN — LISINOPRIL SCH MG: 10 TABLET ORAL at 22:30

## 2018-05-10 RX ADMIN — CIMETIDINE SCH MG: 400 TABLET, FILM COATED ORAL at 10:11

## 2018-05-10 RX ADMIN — GUAIFENESIN SCH MG: 600 TABLET, EXTENDED RELEASE ORAL at 22:31

## 2018-05-10 RX ADMIN — TRAVOPROST SCH DROP: 0.04 SOLUTION/ DROPS OPHTHALMIC at 22:27

## 2018-05-10 RX ADMIN — DORZOLAMIDE HYDROCHLORIDE AND TIMOLOL MALEATE SCH: 20; 5 SOLUTION/ DROPS OPHTHALMIC at 22:35

## 2018-05-10 RX ADMIN — SODIUM CHLORIDE SCH MLS/HR: 9 INJECTION, SOLUTION INTRAVENOUS at 17:41

## 2018-05-10 RX ADMIN — GUAIFENESIN SCH MG: 600 TABLET, EXTENDED RELEASE ORAL at 10:12

## 2018-05-10 NOTE — PCM.PROG
Attending Provider: 


ATTENDING PROVIDER:


Dr. ASHLEIGH VELOZ


This patient is seen with Tami Mleendez, Nurse Practitioner.





DATE OF SERVICE:  05/10/18





SUBJECTIVE: 


This 72 year old WHITE/ F was hospitalized 05/04/18.  The patient is 

sitting in chair, alert.   Shortness of breath improved.  She would like cough 

medication for evening. She has been eating better. Chest x-ray showed mild 

improvement. 





REVIEW OF SYSTEMS:


CONSTITUTIONAL: Positive for weakness. No night sweats. No malaise, lethargy. 

No fever or chills.


HEENT: Eyes: No visual changes. No eye pain. No eye discharge. ENT: No runny 

nose. No epistaxis. No sinus pain. No odynophagia. No congestion.


RESPIRATORY: Cough.  No congestion. No hemoptysis.  No shortness of breath.


CARDIOVASCULAR: No angina symptoms. No CHF symptoms. No atypical chest pain for 

CAD. No palpitations. No orthopnea..


GASTROINTESTINAL: No abdominal pain. No nausea or vomiting. No diarrhea or 

constipation. No hematemesis. No hematochezia.


GENITOURINARY: No urgency. No frequency. No dysuria. No hematuria. No 

obstructive symptoms. No discharge. No pain. No significant abnormal bleeding.


MUSCULOSKELETAL: No musculoskeletal pain; no joint swelling. 


NEUROLOGICAL: Awake, alert, oriented to time, place and person. No headache. No 

neck pain. No syncope. No seizures. No dizziness.


PSYCHIATRIC: Not anxious. No depression. No suicidal thoughts. No homicidal 

thoughts.


SKIN:  No rash. No lesions. No wounds.


ENDOCRINE: No unexplained weight loss. No weight gain. 


HEMATOLOGIC/LYMPHATIC: No anemia. No purpura. No petechiae. No prolonged or 

excessive bleeding. No palpable lymph nodes.





PHYSICAL EXAMINATION:


GENERAL:  The patient is awake, alert and oriented, sitting in chair in no 

distress. 


VITAL SIGNS:  Temperature 98.3 F, Pulse 90, Respiratory Rate 26, /75, 

Pulse Ox 91%


HEENT:  Head normocephalic, atraumatic.  Eyes: Extraocular muscles are intact.  

Pupils are equal, round and reactive to light and accommodation.  Ears:  No 

lesions.  Nose appeared normal. Throat: No exudate or erythema.


NECK: Supple. No JVD, no carotid bruit.  No lymphadenopathy or thyromegaly. 


LUNGS: Diminished breath sounds bilaterally.  Percussion note normal.  Chest 

symmetrical. 


HEART:  S1, S2, no S3. No murmurs.  No cyanosis or clubbing.  No ascites.  

Pulses: Dorsalis pedis and posterior tibial pulses +1 to +2 both sides. 


ABDOMEN:  Soft.  Non-tender.  Bowel sounds active. No CVA tenderness. No mass 

felt. 


EXTREMITIES:  No edema.  Full range of motion of all extremities, equal. 


NEUROLOGIC:  No focal deficit. Cranial nerves II through XII are grossly 

intact.  No headache, no double vision or headache.  


SKIN: Not dry.  Intact.  Turgor-normal. 


LYMPHATIC:  No palpable lymph nodes/no lymphedema. 


MUSCULOSKELETAL:  Normal joints with no swelling. Muscle tone is normal. 








LAB REVIEW:





 05/10/18 04:30 





 05/10/18 04:30 











05/10/18 04:30: WBC 9.56, RBC 3.00 L, Hgb 9.8 L, Hct 30.7 L, .3 H, MCH 

32.7 H, MCHC 31.9, RDW Coeff of Federico 19.5 H, Plt Count 213, Immature Gran % (Auto

) 8.8 H, Neut % (Auto) 78.7, Lymph % (Auto) 7.2 L, Mono % (Auto) 4.7, Eos % (

Auto) 0.0, Baso % (Auto) 0.6, Immature Gran # (Auto) 0.8, Neut # (Auto) 7.5 H, 

Lymph # (Auto) 0.7, Mono # (Auto) 0.5, Eos # (Auto) 0.0, Baso # (Auto) 0.1


05/10/18 04:30: Sodium 137, Potassium 4.6, Chloride 99, Carbon Dioxide 28, 

Anion Gap 14.6, BUN 17, Creatinine 1.03, Estimated GFR (MDRD) 52.00, BUN/

Creatinine Ratio 16.50, Glucose 136 H, Calcium 8.9, Total Bilirubin 0.5, AST 13 

L, ALT 15, Alkaline Phosphatase 46 L, Total Protein 5.4 L, Albumin 2.2 L, 

Globulin 3.2, Albumin/Globulin Ratio 0.69





ASSESSMENT:  


1.  Right  upper and middle lobe pneumonia, improving


2.  Right upper lobe malignancy


3.  Anemia


4.  Generalized weakness


5.  Right ear pain, improving





PLAN:


1.  Prednisone 10 mg b.i.d.  


2.  D/C IV Solu-Medrol


3.  Tussionex b.i.d. 


4.  Anticipate d/c tomorrow





Plan and coordination of the patient's care discussed in the presence of Case 

Manager and nurse.





CONDITION:  Stable











SCRIBED BY:


IRIS CANELA  scribed while in presence of service performed by 

Dr. Veloz/LOIS Siegel on 05/10/18 ([id number])

## 2018-05-11 VITALS — TEMPERATURE: 98.1 F | SYSTOLIC BLOOD PRESSURE: 135 MMHG | DIASTOLIC BLOOD PRESSURE: 83 MMHG

## 2018-05-11 RX ADMIN — FERROUS SULFATE TAB EC 324 MG (65 MG FE EQUIVALENT) SCH MG: 324 (65 FE) TABLET DELAYED RESPONSE at 08:45

## 2018-05-11 RX ADMIN — IPRATROPIUM BROMIDE AND ALBUTEROL SULFATE SCH VIAL: .5; 3 SOLUTION RESPIRATORY (INHALATION) at 04:50

## 2018-05-11 RX ADMIN — LISINOPRIL SCH MG: 10 TABLET ORAL at 08:46

## 2018-05-11 RX ADMIN — Medication SCH SYR: at 06:04

## 2018-05-11 RX ADMIN — CIMETIDINE SCH MG: 400 TABLET, FILM COATED ORAL at 08:46

## 2018-05-11 RX ADMIN — ONDANSETRON HYDROCHLORIDE SCH MG: 4 TABLET, FILM COATED ORAL at 08:46

## 2018-05-11 RX ADMIN — CLOPIDOGREL SCH MG: 75 TABLET, FILM COATED ORAL at 08:45

## 2018-05-11 RX ADMIN — ASPIRIN SCH MG: 81 TABLET, COATED ORAL at 08:46

## 2018-05-11 RX ADMIN — ESCITALOPRAM SCH MG: 10 TABLET, FILM COATED ORAL at 08:45

## 2018-05-11 RX ADMIN — HYDROCODONE POLISTIREX AND CHLORPHENIRAMINE POLISTIREX SCH ML: 10; 8 SUSPENSION, EXTENDED RELEASE ORAL at 08:46

## 2018-05-11 RX ADMIN — SODIUM CHLORIDE SCH MLS/HR: 9 INJECTION, SOLUTION INTRAVENOUS at 06:03

## 2018-05-11 RX ADMIN — GUAIFENESIN SCH MG: 600 TABLET, EXTENDED RELEASE ORAL at 08:45

## 2018-05-11 RX ADMIN — CIPROFLOXACIN AND DEXAMETHASONE SCH DROP: 3; 1 SUSPENSION/ DROPS AURICULAR (OTIC) at 08:46

## 2018-05-11 RX ADMIN — FENOFIBRATE SCH MG: 160 TABLET, FILM COATED ORAL at 08:46

## 2018-05-11 RX ADMIN — SODIUM CHLORIDE SCH MLS/HR: 9 INJECTION, SOLUTION INTRAVENOUS at 00:18

## 2018-05-11 RX ADMIN — DORZOLAMIDE HYDROCHLORIDE AND TIMOLOL MALEATE SCH: 20; 5 SOLUTION/ DROPS OPHTHALMIC at 08:46

## 2018-05-11 RX ADMIN — PREDNISONE SCH MG: 10 TABLET ORAL at 08:45

## 2018-05-11 RX ADMIN — IPRATROPIUM BROMIDE AND ALBUTEROL SULFATE SCH VIAL: .5; 3 SOLUTION RESPIRATORY (INHALATION) at 10:17

## 2018-05-11 RX ADMIN — PANTOPRAZOLE SODIUM SCH MG: 40 TABLET, DELAYED RELEASE ORAL at 06:03

## 2018-05-11 NOTE — CM.DICTOOL
ADMISSION: 


05/04/18 23:49





DISCHARGE: 


MAY 11, 2018








DATE OF SERVICE: 05/11/18


FINAL DIAGNOSIS





PNEUMONIA, RUL AND RML


SPUTUM CULTURE: PSEUDOMONAS AERUGINOSA


LUNG CANCER, RADIATION COMPLETED, CHEMOTHERAPY CURRENTLY (RUL)


COPD


ANEMIA (TRANSFUSION APRIL, 2018)


HYPERTENSION


INFRARENAL AAA, 3.2 CM PER CHEST CT


DYSLIPIDEMIA


CERVICAL CANCER


DIVERTICULITIS


ANXIETY/DEPRESSION


OSTEOARTHRITIS


DJD SPINE


GERD


CURRENT EVERY DAY SMOKER





ENDARTERECTOMY


LEFT BREAST LUMPECTOMY


HYSTERECTOMY


CARDIAC STENT APPLICATION





LAST VITALS











Temp Pulse Resp BP Pulse Ox


 


 98.1 F   97 H  16   135/83   93 L


 


 05/11/18 06:00  05/11/18 06:00  05/11/18 08:00  05/11/18 06:00  05/11/18 06:00








TAKE THESE MEDICATIONS





Hydrocodone Bitart/Acetaminophen (Norco 7.5-325)  1 tab PO TID PRN


   PRN Reason: MODERATE PAIN


   Last Admin: 05/10/18 22:30 Dose:  1 tab


Albuterol/Ipratropium (Duoneb)  1 vial NEB RTQID [facility]


   Last Admin: 05/11/18 04:50 Dose:  1 vial


Alprazolam (Xanax)  0.5 mg PO TID PRN


   PRN Reason: Anxiety


   Last Admin: 05/10/18 22:31 Dose:  0.5 mg


Aspirin (Aspirin Ec)  81 mg PO DAILYWM [facility]


   Last Admin: 05/11/18 08:46 Dose:  81 mg


Chlorphenir/Hydrocodone Polistirex (Tussionex)  5 ml PO BID FOR 10 DAYS


Cimetidine (Tagamet)  800 mg PO BID [facility]


   Last Admin: 05/11/18 08:46 Dose:  800 mg


Ciprofloxacin/Dexamethasone (Ciprodex Otic Suspension)  4 drop OT Q12HR [facility]


   Stop: 05/14/18 21:01


Clopidogrel Bisulfate (Plavix)  75 mg PO DAILY [facility]


   Last Admin: 05/11/18 08:45 Dose:  75 mg


Dorzolamide/Timolol (Cosopt)  1 drop OP BID [facility]


   Last Admin: 05/11/18 08:46 Dose:  Not Given


Escitalopram Oxalate (Lexapro)  10 mg PO DAILY [facility]


   Last Admin: 05/11/18 08:45 Dose:  10 mg


Fenofibrate (Triglide)  160 mg PO DAILY [facility]


   Last Admin: 05/11/18 08:46 Dose:  160 mg


Ferrous Sulfate (Ferrous Sulfate)  324 mg PO BID [facility]


   Last Admin: 05/11/18 08:45 Dose:  324 mg


Guaifenesin (Mucinex)  600 mg PO Q12HR [facility]


   Last Admin: 05/11/18 08:45 Dose:  600 mg


Lisinopril (Zestril)  20 mg PO BID [facility]


   Last Admin: 05/11/18 08:46 Dose:  20 mg


Ondansetron HCl (Zofran Tab)  8 mg PO QID [facility]


   Last Admin: 05/11/18 08:46 Dose:  8 mg


Pantoprazole Sodium (Protonix)  40 mg PO BIDAC [facility]


   Last Admin: 05/11/18 06:03 Dose:  40 mg


Prednisone (Prednisone)  10 mg PO BIDWM VEENA FOR 5 DAYS, THEN DAILY FOR 5 DAYS


Simvastatin (Zocor)  40 mg PO BEDTIME [facility]


   Last Admin: 05/10/18 22:31 Dose:  40 mg


Travoprost (Travatan Z)  1 drop OP BEDTIME [facility]


   Last Admin: 05/10/18 22:27 Dose:  1 drop


Alendronate Sodium (Fosamax) 70 mg PO WEEKLY


Calcium 600 + Vitamin D 1 Daily


Levaquin 500 mg PO DAILY for 7 days


Ipratropium/Albuterol Sulfate (Combivent Respimat Inhal) 2 spray IH BID PRN





MEDICATION CHANGES





STOP PREDNISONE 20 MG  





ALLERGIES





No Known Allergies Allergy (Verified 05/04/18 21:52)





 








NEW PRESCRIPTIONS: 


TUSSIONEX 5 ML BID FOR 10 DAYS


LEVAQUIN 500 MG DAILY FOR 7 DAYS


PREDNISONE 10 MG  BID FOR 5 DAYS, THEN DAILY FOR 5 DAYS


CIPRODEX 4 DROPS BID TO RIGHT EAR UNTIL 5- (HOSPITAL SUPPLY SENT)











SMOKING: 


ADVISED TO STOP SMOKING








DISEASE SPECIFIC EDUCATION: 


PNEUMONIA


USE OF STEROID AND RISK OF GI IRRITATION


APPOINTMENTS











LAB REVIEW:





 05/11/18 04:30 





 05/11/18 04:30 











05/11/18 04:30: Sodium 136, Potassium 4.1, Chloride 96 L, Carbon Dioxide 30, 

Anion Gap 14.1, BUN 17, Creatinine 1.15, Estimated GFR (MDRD) 46.00, BUN/

Creatinine Ratio 14.78, Glucose 103, Calcium 8.8, Total Bilirubin 0.4, AST 13 L

, ALT 14, Alkaline Phosphatase 45 L, Total Protein 5.2 L, Albumin 2.3 L, 

Globulin 2.9, Albumin/Globulin Ratio 0.79


05/11/18 04:30: WBC 9.73, RBC 2.97 L, Hgb 9.8 L, Hct 30.1 L, .3 H, MCH 

33.0 H, MCHC 32.6, RDW Coeff of Federico 19.6 H, Plt Count 241, Immature Gran % (Auto

) 10.6 H, Neut % (Auto) 72.4, Lymph % (Auto) 8.8 L, Mono % (Auto) 7.6, Eos % (

Auto) 0.0, Baso % (Auto) 0.6, Immature Gran # (Auto) 1.0, Neut # (Auto) 7.0 H, 

Lymph # (Auto) 0.9, Mono # (Auto) 0.7, Eos # (Auto) 0.0, Baso # (Auto) 0.1





PLAN: 


DISCHARGE HOME





DIET: REGULAR AS TOLERATED


ACTIVITY: RESUME AS TOLERATED, REST AS NEEDED





CONTINUE TO USE NEBULIZER TREATMENTS 3-4 TIMES DAILY





AN APPOINTMENT IS SCHEDULED WITH DR. JEROME ON MAY 15TH AT 3:30 PM





AN APPOINTMENT IS SCHEDULED WITH LOIS ALBERT ON MAY 16TH AT 9 AM





FULL CODE STATUS 





MS. REYNOLDS IS ALERT AND ORIENTED X 3.  SHE IS INDEPENDENT WITH ACTIVITIES OF 

DAILY LIVING AND IS AMBULATORY WITHOUT USE OF AN ASSISTIVE DEVICE.  MEAL 

INTAKES ARE GOOD AT %. NO NAUSEA OR DIARRHEA IS REPORTED BY THE PATIENT.  

SHE CONTINUES TO HAVE A PRODUCTIVE COUGH OF GRAY-TAN SPUTUM. SHE DENIES CHEST 

PAIN, BUT REPORTS CHRONIC LOW BACK PAIN.  SKIN IS INTACT EXCEPT FOR SMALL AREAS 

OF ECCHYMOSIS TO BOTH ARMS AND DISCOLORATION TO THE RIGHT UPPER BACK FROM 

RADIATION.  SHE HAS A NEBULIZER WITH MEDICATION AT HOME TO USE 3-4 TIMES DAILY.

  SHE DOES NOT USE HOME OXYGEN. 








_________________


ASHLEIGH WATSON MD





__________________


LOIS ALBERT

## 2018-05-11 NOTE — PCM.PROG
Attending Provider: 


ATTENDING PROVIDER:


Dr. ASHLEIGH VELOZ


This patient is seen with Tami Melendez, Nurse Practitioner.





DATE OF SERVICE:  05/11/18





SUBJECTIVE: 


This 72 year old WHITE/ F was hospitalized 05/04/18.  The patient is 

alert, sitting up the chair. Shortness of breath and cough has improved. She 

has been eating fairly well. States she is ready to go home today. Has NEB 

machine at home. 





REVIEW OF SYSTEMS:


CONSTITUTIONAL: No night sweats. Fatigue. No fever or chills. Weakness. 


HEENT: Eyes: No visual changes. No eye pain. No eye discharge. ENT: No runny 

nose. No epistaxis. No sinus pain. No odynophagia. No congestion.


RESPIRATORY: Cough, no congestion. No hemoptysis.  No shortness of breath.


CARDIOVASCULAR: No angina symptoms. No CHF symptoms. No atypical chest pain for 

CAD. No palpitations. No orthopnea..


GASTROINTESTINAL: No abdominal pain. No nausea or vomiting. No diarrhea or 

constipation. No hematemesis. No hematochezia.


GENITOURINARY: No urgency. No frequency. No dysuria. No hematuria. No 

obstructive symptoms. No discharge. No pain. No significant abnormal bleeding.


MUSCULOSKELETAL: No musculoskeletal pain; no joint swelling. 


NEUROLOGICAL: Awake, alert, oriented to time, place and person. No headache. No 

neck pain. No syncope. No seizures. No dizziness.


PSYCHIATRIC: Not anxious. No depression. No suicidal thoughts. No homicidal 

thoughts.


SKIN:  No rash. No lesions. No wounds.


ENDOCRINE: No unexplained weight loss. No weight gain. 


HEMATOLOGIC/LYMPHATIC: No anemia. No purpura. No petechiae. No prolonged or 

excessive bleeding. No palpable lymph nodes.





PHYSICAL EXAMINATION:


GENERAL:  The patient is awake, alert and oriented, sitting in bed in no 

distress. 


VITAL SIGNS:  Temperature 98.1 F, Pulse 97, Respiratory Rate 12, /83, 

Pulse Ox 93%


HEENT:  Head normocephalic, atraumatic.  Eyes: Extraocular muscles are intact.  

Pupils are equal, round and reactive to light and accommodation.  Ears:  No 

lesions.  Nose appeared normal. Throat: No exudate or erythema.


NECK: Supple. No JVD, no carotid bruit.  No lymphadenopathy or thyromegaly. 


LUNGS: Diminished breath sounds, faint expiratory wheezing on right. Clear to 

auscultation.  Percussion note normal.  Chest symmetrical. 


HEART:  S1, S2, no S3. No murmurs.  No cyanosis or clubbing.  No ascites.  

Pulses: Dorsalis pedis and posterior tibial pulses +1 to +2 both sides. 


ABDOMEN:  Soft.  Non-tender.  Bowel sounds active. No CVA tenderness. No mass 

felt. 


EXTREMITIES:  No edema.  Full range of motion of all extremities, equal. 


NEUROLOGIC:  No focal deficit. Cranial nerves II through XII are grossly 

intact.  No headache, no double vision or headache.  


SKIN: Not dry.  Intact.  Turgor-normal. 


LYMPHATIC:  No palpable lymph nodes/no lymphedema. 


MUSCULOSKELETAL:  Normal joints with no swelling. Muscle tone is normal. 








LAB REVIEW:





 05/11/18 04:30 





 05/11/18 04:30 











05/11/18 04:30: Sodium 136, Potassium 4.1, Chloride 96 L, Carbon Dioxide 30, 

Anion Gap 14.1, BUN 17, Creatinine 1.15, Estimated GFR (MDRD) 46.00, BUN/

Creatinine Ratio 14.78, Glucose 103, Calcium 8.8, Total Bilirubin 0.4, AST 13 L

, ALT 14, Alkaline Phosphatase 45 L, Total Protein 5.2 L, Albumin 2.3 L, 

Globulin 2.9, Albumin/Globulin Ratio 0.79


05/11/18 04:30: WBC 9.73, RBC 2.97 L, Hgb 9.8 L, Hct 30.1 L, .3 H, MCH 

33.0 H, MCHC 32.6, RDW Coeff of Federico 19.6 H, Plt Count 241, Immature Gran % (Auto

) 10.6 H, Neut % (Auto) 72.4, Lymph % (Auto) 8.8 L, Mono % (Auto) 7.6, Eos % (

Auto) 0.0, Baso % (Auto) 0.6, Immature Gran # (Auto) 1.0, Neut # (Auto) 7.0 H, 

Lymph # (Auto) 0.9, Mono # (Auto) 0.7, Eos # (Auto) 0.0, Baso # (Auto) 0.1





ASSESSMENT:  Please see below.


1. Right upper and lower lobe pneumonia, improving 


2. Right upper lobe malignancy 


3. Right ear pain, improving 


4. COPD 





PLAN:


1. Continue -ciprovex ear drops 


2. Prednisone 10 bid time 5 days then 5mg for 5  day 


3. Appointment with Dr. Marie


4. Will see us later next week 


5. Levaquin 500 PO daily for 7 days 


6. DUO NEBS at home to do 3-4 times daily 





Plan and coordination of the patient's care discussed in the presence of Case 

Manager and nurse.





SCRIBED BY:


JACOB BEACH,  scribed while in presence of service performed 

by Dr. Veloz/LOIS Siegel on 05/11/18 ([id number])

## 2018-05-16 NOTE — PN
DATE OF SERVICE:  05/08/18



SUBJECTIVE:  

The patient was hospitalized with pneumonia.  72-year-old female with lung 
cancer. Her condition is improving, her appetite is improving. Continue nebs 
and antibiotics. Prognosis is guarded. 





TIME SPENT:  More than 30 minutes. 



Plan and coordination of the patient's care discussed in the presence of nurse. 
HOMERO

## 2018-05-16 NOTE — DS
DATE OF SERVICE:  05/11/18



FINAL DIAGNOSIS: 

1.   PNEUMONIA, RIGHT UPPER LOBE AND RIGHT MIDDLE LOBE

2.   SPUTUM CULTURE; PSEUDOMONAS AERUGINOSA

3.   LUNG CANCER, RADIATION COMPLETED, CHEMOTHERAPY CURRENTLY (RUL)

4.   COPD

5.   ANEMIA (TRANSFUSION APRIL, 2018)

6.   HYPERTENSION

7.   INFRARENAL AAA, 3.2 CM PER CHEST CT

8.   DYSLIPIDEMIA

9.   CERVICAL CANCER

10.  DIVERTICULITIS

11.  ANXIETY/DEPRESSION

12.  OSTEOARTHRITIS

13.  DJD SPINE

14.  GERD

15.  CURRENT EVERY DAY SMOKER

16.  ENDARTERECTOMY

17.  LEFT BREAST LUMPECTOMY

18.  HYSTERECTOMY

19.  CARDIAC STENT APPLICATION



DISCHARGE INSTRUCTIONS:

Followup appointment scheduled with Dr. Marie on May 15, 2018 at 3:30 p.m.  
An appointment is scheduled with LOIS Siegel on May 16, 2018 at 9 a.m.
  Full code status.



MEDICATIONS AT DISCHARGE: (TAKE THESE MEDICATIONS)

Norco 7.5-325 one tab p.o.t.i.d. p.r.n.

Duoneb one vial neb RT q.i.d. [facility]

Xanax 0.5 mg p.o. t.i.d. p.r.n.

Aspirin 81 mg p.o. daily with meal [facility]

Tussionex 5 mL p.o. b.i.d. for 10 days

Tagamet 800 mg p.o. b.i.d. [facility]

Ciprodex Otic Suspension four drop OT q.12hr [facility]

Plavix 75 mg p.o. daily [facility]

Cosopt one drop OP b.i.d. [facility]

Lexapro 10 mg p.o. daily [facility]

Triglide 160 mg p.o. daily [facility]

Ferrous Sulfate 324 mg p.o. b.i.d. [facility]

Mucinex 600 mg p.o. q.12hr [facility]

Zestril 20 mg p.o. b.i.d. [facility]

Zofran 8 mg p.o. q.i.d. [facility]

Protonix 40 mg p.o. b.i.d. a.c. [facility]

Prednisone 10 mg p.o. b.i.d. with meal VEENA for 5 days, then daily for 5 days

Zocor 40 mg p.o. bedtime [facility]

Travatan Z one drop OP bedtime [facility]

Fosamax 70 mg p.o. weekly

Calcium 600 + Vitamin D one daily

Levaquin 500 mg p.o. daily for 7 days

Ipratropium/Albuterol two spray IH b.i.d. p.r.n. 



MEDICATION CHANGES:

Stop Prednisone 20 mg



NEW PRESCRIPTIONS:

Tussionex 5 mL b.i.d. for 10 days

Levaquin 500 mg daily for 7 days

Prednisone 10 mg b.i.d. for 5 days then daily for 5 days

Ciprodex 4 drops b.i.d. to right ear until 5/14/18 (hospital supply sent)



DIET INSTRUCTIONS: 

Regular as tolerated



ACTIVITY:

Regular as tolerated, rest as needed



SMOKING:

Advised to stop smoking



DISEASE SPECIFIC EDUCATION:

Pneumonia

Use of steroid and risk of GI irritation

Appointments



HOSPITAL COURSE:

This is a 72-year-old white female with a history of right upper lobe 
malignancy who is currently undergoing chemotherapy. She just recently finished 
radiation with Dr. Chavarria. She was hospitalized approximately 2 weeks ago with 
right upper lobe pneumonia surrounding the malignancy. She was discharged in 
stable condition, was continued on p.o. antibiotics and continued to return to 
the office with an extension of p.o. antibiotics and steroids without 
improvement. She then returned to the emergency room with increasing weakness 
and shortness of breath and it was found on CT scan that she had then developed 
right middle lobe pneumonia as well. She was admitted through the emergency room
, placed on Zosyn q.6hr as well as Solu-Medrol 80 mg IV q.12 hr.  Initially she 
was short of breath requiring oxygen at 1 to 2L. On the second day following 
admission, I increased the steroids to 80 mg IV q.8hr. A sputum culture was 
done which came back positive for Pseudomonas. It was sensitive to Zosyn and 
also sensitive to Levaquin which she will go home on Levaquin 500 mg daily for 
the next 7 days. With Duonebs and the administration of the antibiotics, her 
breathing has slowly improved. She does have increased breath sounds on the 
right side. Initially she started with crepitations and a slight wheeze in the 
right upper lobe. Today, on day of discharge, she just has diminished sounds 
with no wheezing on in the right upper lobe. Yesterday, her IV steroids were 
discontinued and we placed her on p.o. Prednisone 10 mg b.i.d. again today. We 
will discharge her on Levaquin 500 mg daily for the next 7 days as the 
Pseudomonas is sensitive to his. On the third day after discharge, she was 
complaining of right ear pain. After examination, she had redness of the ear 
canal however tympanic membrane was normal showing no inner ear infection so I 
started her on Ciprodex otic solution four drops twice daily. This has 
subsequently improved the pain. She will be sent home with these drops to 
finish out a weeks' course. The patient has a nebulizer machine at home. She is 
instructed to continue with her Duonebs at least three times a day.  She may do 
these as often as every four hours if needed.  Her Tussionex has helped 
significantly in order to help her rest. Will send her home with a prescription 
for Tussionex that she can take twice a day.  All of her other medications have 
remained unchanged. For the past 48 hours she has been able to be up and about 
without oxyggen, has been sleeping well. Her appetite has improved just within 
the past 24 hours. She has been eating about 75% of her meals.  She initially 
had some anemia with hemoglobin dropping to 8.4 but it has resolved on its own 
and is up to 9.8 which is about her baseline while she is undergoing 
chemotherapy. Dr. Marie was made aware of her hospitalization. She has an 
appointment with him next Tuesday. She will then see us later in the week next 
Thursday or Friday. She is discharged in stable condition. 



TIME SPENT:  More than 60 minutes. 
HOMERO

## 2018-05-23 ENCOUNTER — HOSPITAL ENCOUNTER (INPATIENT)
Dept: HOSPITAL 58 - ED | Age: 73
LOS: 8 days | Discharge: NURSING, REHAB OR LONG-TERM CARE | DRG: 177 | End: 2018-05-31
Attending: INTERNAL MEDICINE | Admitting: INTERNAL MEDICINE

## 2018-05-23 VITALS — BODY MASS INDEX: 18.1 KG/M2

## 2018-05-23 DIAGNOSIS — R10.9: ICD-10-CM

## 2018-05-23 DIAGNOSIS — D09.9: ICD-10-CM

## 2018-05-23 DIAGNOSIS — K59.03: ICD-10-CM

## 2018-05-23 DIAGNOSIS — D64.9: ICD-10-CM

## 2018-05-23 DIAGNOSIS — I71.4: ICD-10-CM

## 2018-05-23 DIAGNOSIS — K57.90: ICD-10-CM

## 2018-05-23 DIAGNOSIS — J44.9: ICD-10-CM

## 2018-05-23 DIAGNOSIS — M19.90: ICD-10-CM

## 2018-05-23 DIAGNOSIS — I25.10: ICD-10-CM

## 2018-05-23 DIAGNOSIS — J15.1: Primary | ICD-10-CM

## 2018-05-23 DIAGNOSIS — K56.609: ICD-10-CM

## 2018-05-23 DIAGNOSIS — E03.9: ICD-10-CM

## 2018-05-23 DIAGNOSIS — E78.5: ICD-10-CM

## 2018-05-23 DIAGNOSIS — F41.8: ICD-10-CM

## 2018-05-23 DIAGNOSIS — M47.9: ICD-10-CM

## 2018-05-23 DIAGNOSIS — K85.90: ICD-10-CM

## 2018-05-23 DIAGNOSIS — K52.9: ICD-10-CM

## 2018-05-23 DIAGNOSIS — R73.9: ICD-10-CM

## 2018-05-23 DIAGNOSIS — R60.0: ICD-10-CM

## 2018-05-23 DIAGNOSIS — Z85.41: ICD-10-CM

## 2018-05-23 DIAGNOSIS — D69.59: ICD-10-CM

## 2018-05-23 DIAGNOSIS — N17.9: ICD-10-CM

## 2018-05-23 DIAGNOSIS — I10: ICD-10-CM

## 2018-05-23 DIAGNOSIS — T36.95XA: ICD-10-CM

## 2018-05-23 DIAGNOSIS — C34.11: ICD-10-CM

## 2018-05-23 DIAGNOSIS — E87.5: ICD-10-CM

## 2018-05-23 PROCEDURE — 84145 PROCALCITONIN (PCT): CPT

## 2018-05-23 PROCEDURE — 83690 ASSAY OF LIPASE: CPT

## 2018-05-23 PROCEDURE — 84484 ASSAY OF TROPONIN QUANT: CPT

## 2018-05-23 PROCEDURE — 99233 SBSQ HOSP IP/OBS HIGH 50: CPT

## 2018-05-23 PROCEDURE — 85018 HEMOGLOBIN: CPT

## 2018-05-23 PROCEDURE — 82140 ASSAY OF AMMONIA: CPT

## 2018-05-23 PROCEDURE — 80053 COMPREHEN METABOLIC PANEL: CPT

## 2018-05-23 PROCEDURE — 83605 ASSAY OF LACTIC ACID: CPT

## 2018-05-23 PROCEDURE — 85025 COMPLETE CBC W/AUTO DIFF WBC: CPT

## 2018-05-23 PROCEDURE — 81001 URINALYSIS AUTO W/SCOPE: CPT

## 2018-05-23 PROCEDURE — 85007 BL SMEAR W/DIFF WBC COUNT: CPT

## 2018-05-23 PROCEDURE — 96375 TX/PRO/DX INJ NEW DRUG ADDON: CPT

## 2018-05-23 PROCEDURE — 36415 COLL VENOUS BLD VENIPUNCTURE: CPT

## 2018-05-23 PROCEDURE — 87081 CULTURE SCREEN ONLY: CPT

## 2018-05-23 PROCEDURE — 85014 HEMATOCRIT: CPT

## 2018-05-23 PROCEDURE — 87086 URINE CULTURE/COLONY COUNT: CPT

## 2018-05-23 PROCEDURE — 86922 COMPATIBILITY TEST ANTIGLOB: CPT

## 2018-05-23 PROCEDURE — 93010 ELECTROCARDIOGRAM REPORT: CPT

## 2018-05-23 PROCEDURE — 87040 BLOOD CULTURE FOR BACTERIA: CPT

## 2018-05-23 PROCEDURE — 97802 MEDICAL NUTRITION INDIV IN: CPT

## 2018-05-23 PROCEDURE — 87070 CULTURE OTHR SPECIMN AEROBIC: CPT

## 2018-05-23 PROCEDURE — 86850 RBC ANTIBODY SCREEN: CPT

## 2018-05-23 PROCEDURE — 85008 BL SMEAR W/O DIFF WBC COUNT: CPT

## 2018-05-23 PROCEDURE — 96365 THER/PROPH/DIAG IV INF INIT: CPT

## 2018-05-23 PROCEDURE — 86900 BLOOD TYPING SEROLOGIC ABO: CPT

## 2018-05-23 PROCEDURE — 87186 SC STD MICRODIL/AGAR DIL: CPT

## 2018-05-23 PROCEDURE — 99239 HOSP IP/OBS DSCHRG MGMT >30: CPT

## 2018-05-23 PROCEDURE — 94640 AIRWAY INHALATION TREATMENT: CPT

## 2018-05-23 PROCEDURE — 99232 SBSQ HOSP IP/OBS MODERATE 35: CPT

## 2018-05-23 PROCEDURE — 82803 BLOOD GASES ANY COMBINATION: CPT

## 2018-05-23 PROCEDURE — 82550 ASSAY OF CK (CPK): CPT

## 2018-05-23 PROCEDURE — 93005 ELECTROCARDIOGRAM TRACING: CPT

## 2018-05-23 PROCEDURE — 82150 ASSAY OF AMYLASE: CPT

## 2018-05-23 PROCEDURE — 36430 TRANSFUSION BLD/BLD COMPNT: CPT

## 2018-05-23 PROCEDURE — 99284 EMERGENCY DEPT VISIT MOD MDM: CPT

## 2018-05-23 NOTE — CT
Exam:  CT of the chest without contrast 

  

History:  Lung carcinoma and pneumonia 

  

Technique:  5 mm CT of the chest without intravascular contrast 

  

FINDINGS:  Compared with 05/04/2018.  Large cavitary mass in the right upper lobe again noted with in
creasing peripheral soft tissue density.  Increasing consolidative change of the right middle lobe an
d medial right lower lobe.  No developing opacities on the left.  Mild underlying emphysematous durán
e.  Heavy atherosclerotic vascular calcifications again noted.  Left approach Port-A-Cath.  No develo
ping chest wall abnormalities.  No developing abnormalities of the upper abdomen. 

  

Impression: 

1.  Right upper lobe cavitary mass again noted.  Previous surrounding nodular pneumonia has progresse
d to a consolidative pneumonia of the right upper, middle and lower lobe.

## 2018-05-23 NOTE — ED.PDOC
General


ED Provider: 


Dr. MOY URBAN





Chief Complaint: Abdominal Pain


Stated Complaint: Patient been hurting in the mid abdomen since noon,.  vomited 

x3 diarrhea x3.  was here 2 weeks ago for the pneumonia.


Time Seen by Physician: 22:34


Primary Care Provider: 


ASHLEIGH WATSON





Nursing and Triage Documentation Reviewed and Agree: Yes


Reviewed sepsis parameters & appropriate labs ordered?: Yes


System Inflammatory Response Syndrome: Pulse >90 BPM, Resp >20/Minute


Sepsis Protocol: 


For patient's 13 years and over:





Temp is 96.8 and below  and greater


Pulse >90 BPM


Resp >20/minute


Acutely Altered Mental Status





Are patient's symptoms suggestive of a new infection, such as:


   -Pneumonia


   -Skin, Soft Tissue


   -Endocarditis


   -UTI


   -Bone, Joint Infection


   -Implantable Device


   -Acute Abdominal Infection


   -Wound Infection


   -Meningitis


   -Blood Stream Catheter Infection


   -Unknown








GI Complaint Exam





- Abdominal Pain Complaint/Exam


Onset: Gradual


Symptoms Are: Still present


Timing: Constant


Initial Severity: Severe


Current Severity: Severe


Location of Pain: Discrete


Radiates To: Reports: Back, Flank


Character: Reports: Dull, Aching


Aggravating: Reports: Movement, Food


Alleviating: Reports: None


Associated Signs and Symptoms: Reports: Diaphoresis, Nausea, Vomiting, 

Diarrhea.  Denies: Fever, Cough, Chest pain, Dizziness, Back pain, Constipation

, Blood in stool, Dysuria, Urinary frequency, Decreased urine output, Decreased 

appetite, Vaginal bleeding, Vaginal discharge, Sore throat, Decreased activity


Related History: Reports: Similar episode


AAA Risk Factors: Reports: None


Cardiac Risk Factors: Reports: Hypertension, Smoking, Elevated lipids


Ectopic Pregnancy Risk Factors: Reports: None


Ovarian Torsion Risk Factors: Reports: None


Surgical Obstruction Risk Factors: Reports: None


Related Surgical History: Reports: None


Patient Rh Status: Unknown


Abdominal Findings: Present: None


Vaginal Exam: Present: Normal Findings


Differential Diagnoses: Diverticulitis, Gastroenteritis, Pancreatitis, Pneumonia

, PUD


Quality Indicators for AMI: EKG in 10min.





Review of Systems





- Review Of Systems


Constitutional: Reports: Fever, Malaise, Weakness


Eyes: Reports: No symptoms


Ears, Nose, Mouth, Throat: Reports: No symptoms


Respiratory: Reports: Cough, Orthopnea, Short of air


Cardiac: Reports: No symptoms


GI: Reports: Abdominal pain, Nausea, Vomiting


: Reports: No symptoms


Musculoskeletal: Reports: No symptoms


Skin: Reports: No symptoms


Neurological: Reports: No symptoms


Endocrine: Reports: No symptoms


Hematologic/Lymphatic: Reports: No symptoms


All Other Systems: Reviewed and Negative





Past Medical History





- Past Medical History


Previously Healthy: No


Endocrine: Reports: Hypothyroid, Dyslipidemia


Cardiovascular: Reports: CAD, Hypertension


Respiratory: Reports: COPD


Hematological: Reports: Anemia


Gastrointestinal: Reports: Unknown


Genitourinary: Reports: CKD


Neuro/Psych: Reports: Anxiety, Depression


Musculoskeletal: Reports: Arthritis, Back Pain, Joint Pain


Cancer: Reports: Lung (cancer s/p chemo.)





- Surgical History


General Surgical History: Reports: None





- Family History


Family History: Reports: Unknown





- Social History


Smoking Status: Current some day smoker


Smoking Cessation Counseling Time: > 10 min


Hx Substance Use: No


Alcohol Screening: None





Physical Exam





- Physical Exam


Appearance: Ill-appearing, Thin


Ill-appearing: Moderate


Pain Distress: Moderate


Eyes: EOMI, Conjunctiva clear


ENT: Ears normal, Nose normal, Oropharynx normal


Respiratory: Breath sounds diminished, Crackles, Wheezes


Cardiovascular: RRR, Pulses normal, No rub, No murmur


GI/: Soft, Tender, Bowel sounds hypoactive


Musculoskeletal: Normal strength, ROM intact, No edema, No calf tenderness


Skin: Warm, Dry, Normal color


Neurological: Sensation intact, Motor intact, Reflexes intact, Cranial nerves 

intact, Alert, Oriented


Psychiatric: Affect appropriate, Mood appropriate





Interpretation





- Radiology Interpretation


Radiology Interpretation By: Radiologist


Radiology Results: Positive


Exam Interpreted: CT Scan





Re-Evaluation





- Re-Evaluation


Time of Re-Evaluation: 00:27


Status: Improved





Physician Notification





- Case Discussed


Time of Notification: 00:28 (dR [facility], said trasf to [facility] as patient may have 

post obstructive pneumonia)





Critical Care Note





- Critical Care Note


Total Time (mins): 30





Course





- Course


Hematology/Chemistry: 


 05/23/18 22:50





 05/23/18 22:50


Orders, Labs, Meds: 


Lab Review











  05/23/18 05/23/18 05/23/18





  22:33 22:50 22:50


 


WBC   16.28 H 


 


RBC   3.28 L 


 


Hgb   10.8 L 


 


Hct   32.9 L 


 


MCV   100.3 H 


 


MCH   32.9 H 


 


MCHC   32.8 


 


RDW Coeff of Federico   20.1 H 


 


Plt Count   151 


 


Neutrophils % (Manual)   89.0 H 


 


Band Neutrophils %   1.0 


 


Lymphocytes % (Manual)   3.0 L 


 


Monocytes % (Manual)   4.0 


 


Metamyelocytes %   2.0 


 


Myelocytes %   1.0 


 


Anisocytosis   Not present 


 


Puncture Site  Lrad  


 


O2 Saturation  99.0  


 


ABG pH  7.455 H  


 


ABG pCO2  28.4 L  


 


ABG pO2  110.0 H  


 


ABG HCO3  20.0 L  


 


ABG Total CO2  21 L  


 


ABG Base Excess  -4 L  


 


Onur Test  +  


 


FiO2 %  21.0  


 


Sodium    131 L


 


Potassium    4.1


 


Chloride    94 L


 


Carbon Dioxide    19 L


 


Anion Gap    22.1


 


BUN    20 H


 


Creatinine    1.60 H


 


Estimated GFR (MDRD)    32.00


 


BUN/Creatinine Ratio    12.50


 


Glucose    179 H


 


Lactic Acid   


 


Calcium    9.4


 


Total Bilirubin    1.1


 


AST    31


 


ALT    17


 


Alkaline Phosphatase    93


 


Total Protein    6.7


 


Albumin    2.0 L


 


Globulin    4.7


 


Albumin/Globulin Ratio    0.43


 


Amylase    528 H*


 


Lipase    38


 


Procalcitonin   














  05/23/18 05/23/18





  22:50 22:50


 


WBC  


 


RBC  


 


Hgb  


 


Hct  


 


MCV  


 


MCH  


 


MCHC  


 


RDW Coeff of Federico  


 


Plt Count  


 


Neutrophils % (Manual)  


 


Band Neutrophils %  


 


Lymphocytes % (Manual)  


 


Monocytes % (Manual)  


 


Metamyelocytes %  


 


Myelocytes %  


 


Anisocytosis  


 


Puncture Site  


 


O2 Saturation  


 


ABG pH  


 


ABG pCO2  


 


ABG pO2  


 


ABG HCO3  


 


ABG Total CO2  


 


ABG Base Excess  


 


Onur Test  


 


FiO2 %  


 


Sodium  


 


Potassium  


 


Chloride  


 


Carbon Dioxide  


 


Anion Gap  


 


BUN  


 


Creatinine  


 


Estimated GFR (MDRD)  


 


BUN/Creatinine Ratio  


 


Glucose  


 


Lactic Acid  30.8 H 


 


Calcium  


 


Total Bilirubin  


 


AST  


 


ALT  


 


Alkaline Phosphatase  


 


Total Protein  


 


Albumin  


 


Globulin  


 


Albumin/Globulin Ratio  


 


Amylase  


 


Lipase  


 


Procalcitonin   2.57








Orders











 Category Date Time Status


 


 ABG DRAW REQUEST Stat CARDIO  05/23/18 22:34 Completed


 


 EKG-(ED ONLY) Stat CARDIO  05/23/18 22:35 Completed


 


 ED IV/MEDIPORT/POWERPORT .ONCE EMERGENCY  05/23/18 22:29 Active


 


 ABG Stat LAB  05/23/18 22:33 Completed


 


 AMYLASE Stat LAB  05/23/18 22:50 Completed


 


 BLOOD CULTURE Stat LAB  05/23/18 22:50 Received


 


 CBC W/ AUTO DIFF Stat LAB  05/23/18 22:50 Completed


 


 COMPREHENSIVE METABOLIC PANEL Stat LAB  05/23/18 22:50 Completed


 


 LACTIC ACID Stat LAB  05/23/18 22:50 Completed


 


 LIPASE Stat LAB  05/23/18 22:50 Completed


 


 MANUAL DIFFERENTIAL Stat LAB  05/23/18 22:50 Completed


 


 PROCALCITONIN Stat LAB  05/23/18 22:50 Completed


 


 SPUTUM CULTURE Stat LAB  05/23/18 23:54 Uncollected


 


 0.9 % Sodium Chloride [Saline Flush] MEDS  05/23/18 22:29 Ordered





 1 syr IVF PRN PRN   


 


 Meperidine HCl/Pf [Demerol 50 mg/ml Vial] MEDS  05/23/18 22:29 Discontinued





 25 mg IVP ONCE STA   


 


 Ondansetron HCl/Pf [Zofran 4 mg/2 ml] MEDS  05/23/18 22:29 Discontinued





 4 mg IVP ONCE STA   


 


 Piperacillin Sodium/Tazobactam [Zosyn 3.375 gm] 3.375 MEDS  05/24/18 00:39 

Ordered





 gm   





 0.9 % Sodium Chloride [Sodium Chloride] 50 ml   





 IV ONCE   


 


 Vancomycin HCl [Vancomycin] 1 gm MEDS  05/24/18 00:39 Ordered





 0.9 % Sodium Chloride [Sodium Chloride] 250 ml   





 IV ONCE   


 


 CT ABDOMEN/PELVIS WO CONTRAST Stat RADS  05/23/18 22:29 Completed


 


 CT CHEST W/O CONTRAST Stat RADS  05/23/18 22:29 Completed








Medications











Generic Name Dose Route Start Last Admin





  Trade Name Freq  PRN Reason Stop Dose Admin


 


Piperacillin Sod/Tazobactam  50 mls @ 50 mls/hr  05/24/18 00:39  





  Sod 3.375 gm/ Sodium Chloride  IV  05/24/18 01:38  





  ONCE STA   





     





     





     





     


 


Vancomycin HCl 1 gm/ Sodium  250 mls @ 250 mls/hr  05/24/18 00:39  





  Chloride  IV  05/24/18 01:38  





  ONCE STA   





     





     





     





     


 


Sodium Chloride  1 syr  05/23/18 22:29  





  Saline Flush  IVF   





  PRN PRN   





  To flush IV   





     





     





     














Discontinued Medications














Generic Name Dose Route Start Last Admin





  Trade Name Freq  PRN Reason Stop Dose Admin


 


Meperidine HCl  25 mg  05/23/18 22:29  05/23/18 23:51





  Demerol 50 Mg/Ml Vial  IVP  05/23/18 22:30  25 mg





  ONCE STA   Administration





     





     





     





     


 


Ondansetron HCl  4 mg  05/23/18 22:29  05/23/18 23:51





  Zofran 4 Mg/2 Ml  IVP  05/23/18 22:30  4 mg





  ONCE STA   Administration





     





     





     





     











Vital Signs: 


 











  Temp Pulse Resp BP Pulse Ox


 


 05/23/18 22:26  97.1 F L  92 H  20  97/44 L  89 L














Departure





- Departure


Time of Disposition: 00:31


Disposition: [facility]SF SHORT-TRM HOSP


Discharge Problem: 


Pneumonia


Qualifiers:


 Pneumonia type: due to unspecified organism Laterality: right Lung location: 

lower lobe of lung Qualified Code(s): J18.1 - Lobar pneumonia, unspecified 

organism





Constipation


Qualifiers:


 Constipation type: drug induced constipation Qualified Code(s): K59.03 - Drug 

induced constipation





Instructions:  Pneumonitis (ED)


Condition: Stable


Pt referred to PMD for follow-up: Yes


IPMP verified?: No


Additional Instructions: 


Dr Bright accepted patient at [facility].





Allergies/Adverse Reactions: 


Allergies





No Known Allergies Allergy (Verified 05/23/18 22:26)


 








Home Medications: 


Ambulatory Orders





Alprazolam 0.5 mg PO TID PRN 02/24/17 


Aspirin [Aspirin EC] 81 mg PO DAILY 02/24/17 


Clopidogrel Bisulfate [Clopidogrel] 75 mg PO DAILY 02/24/17 


Fenofibrate 160 mg PO DAILY 02/24/17 


Ferrous Sulfate 325 mg PO BID 02/24/17 


Lisinopril 20 mg PO BID 02/24/17 


Simvastatin [Zocor] 40 mg PO BEDTIME 02/24/17 


Calcium Carbonate/Vitamin D3 [Calcium 600 + Vit D Tablet] 1 each PO DAILY 02/25/ 17 


Fish Oil/Dha/Epa [Fish Oil 1,200 mg Fish Oil] 1 each PO TID 02/25/17 


Hydrocodone/Acetaminophen [Hydrocodon-Acetaminoph 7.5-325] 1 each PO TID PRN 02/ 25/17 


Pantoprazole Sodium [Protonix] 40 mg PO BIDAC 02/25/17 


Travoprost Opth [Travatan Z] 1 drop OP BEDTIME 02/25/17 


Dorzolamide HCl/Timolol Maleat [Dorzolamide-Timolol Eye Drops] 1 drop EACHEYE 

BID 03/28/17 


Escitalopram Oxalate [Lexapro] 10 mg PO DAILY 03/28/17 


Alendronate Sodium [Fosamax] 70 mg PO WEEKLY 04/19/18 


Cimetidine 800 mg PO BID 04/19/18 


Guaifenesin [Mucinex] 600 mg PO Q12H 04/19/18 


Ondansetron HCl [Zofran] 8 mg PO QID 04/19/18 


Ipratropium/Albuterol Neb [Duoneb] 1 vial NEB RTQ6H #120 vial.neb 04/23/18 


Ipratropium/Albuterol Sulfate [Combivent Respimat Inhal Spray] 2 spray IH BID 

PRN #1 aero 04/23/18 


Hydrocodone/Chlorphen Polis [Tussionex] 5 ml PO Q12H #100 ml 05/11/18 


Levofloxacin [Levaquin] 500 mg PO QDAC #7 tablet 05/11/18 


Prednisone 10 mg PO BIDWM #15 tablet 05/11/18 








Disposition Discussed With: Patient, Family

## 2018-05-23 NOTE — CT
Exam:  CT of the abdomen and pelvis without contrast 

  

History:  Abdominal pain 

  

Technique:  3 mm CT of the abdomen and pelvis without intravascular contrast 

  

FINDINGS:  The lung bases are clear. No significant liver abnormality. The adrenals, pancreas and spl
een are unremarkable. The stomach and hiatus are unremarkable.The gallbladder is distended but appear
s normal otherwise. Kidneys and proximal collecting system are unremarkable. Atherosclerotic calcific
ation of the aorta with infrarenal aneurysm measuring 3.5 x 3.3 cm previously 3.30 x 3.3 cm..  The ap
pendix is normal.  Moderate pan colonic stool retention. Bowel loops demonstrate normal caliber. No i
nflamatory change seen in the mesentery or retroperitoneum. 

  

Moderate distal stool retention.  No pelvic fat inflammation.  Prior hysterectomy.  Normal, nondisten
ded urinary bladder.  No acute abnormality of the skeleton. 

  

Impression: 

1.  No inflammatory process, bowel or urinary obstruction is seen. 

2.  Abdominal aortic aneurysm with measurements as described.  No periaortic stranding.

## 2018-05-24 PROCEDURE — 30233N1 TRANSFUSION OF NONAUTOLOGOUS RED BLOOD CELLS INTO PERIPHERAL VEIN, PERCUTANEOUS APPROACH: ICD-10-PCS | Performed by: INTERNAL MEDICINE

## 2018-05-24 RX ADMIN — ASPIRIN SCH MG: 81 TABLET, COATED ORAL at 09:12

## 2018-05-24 RX ADMIN — Medication SCH MG: at 20:26

## 2018-05-24 RX ADMIN — IPRATROPIUM BROMIDE AND ALBUTEROL SULFATE SCH VIAL: .5; 3 SOLUTION RESPIRATORY (INHALATION) at 05:00

## 2018-05-24 RX ADMIN — ONDANSETRON HYDROCHLORIDE SCH MG: 4 TABLET, FILM COATED ORAL at 09:11

## 2018-05-24 RX ADMIN — Medication SCH MG: at 16:21

## 2018-05-24 RX ADMIN — LEVALBUTEROL HYDROCHLORIDE SCH VIAL: 1.25 SOLUTION RESPIRATORY (INHALATION) at 17:33

## 2018-05-24 RX ADMIN — FERROUS SULFATE TAB EC 324 MG (65 MG FE EQUIVALENT) SCH MG: 324 (65 FE) TABLET DELAYED RESPONSE at 20:25

## 2018-05-24 RX ADMIN — ONDANSETRON HYDROCHLORIDE SCH MG: 4 TABLET, FILM COATED ORAL at 12:31

## 2018-05-24 RX ADMIN — RANITIDINE SCH MG: 150 TABLET ORAL at 09:13

## 2018-05-24 RX ADMIN — CLOPIDOGREL SCH MG: 75 TABLET, FILM COATED ORAL at 09:11

## 2018-05-24 RX ADMIN — POLYETHYLENE GLYCOL 3350 SCH GM: 17 POWDER, FOR SOLUTION ORAL at 09:12

## 2018-05-24 RX ADMIN — DORZOLAMIDE HYDROCHLORIDE AND TIMOLOL MALEATE SCH: 20; 5 SOLUTION/ DROPS OPHTHALMIC at 09:12

## 2018-05-24 RX ADMIN — LISINOPRIL SCH MG: 10 TABLET ORAL at 09:12

## 2018-05-24 RX ADMIN — RANITIDINE SCH MG: 150 TABLET ORAL at 16:21

## 2018-05-24 RX ADMIN — Medication PRN UNIT: at 22:36

## 2018-05-24 RX ADMIN — ESCITALOPRAM SCH MG: 10 TABLET, FILM COATED ORAL at 09:11

## 2018-05-24 RX ADMIN — PANTOPRAZOLE SODIUM SCH MG: 40 TABLET, DELAYED RELEASE ORAL at 16:20

## 2018-05-24 RX ADMIN — Medication PRN UNIT: at 18:15

## 2018-05-24 RX ADMIN — Medication SCH MG: at 09:11

## 2018-05-24 RX ADMIN — PANTOPRAZOLE SODIUM SCH MG: 40 TABLET, DELAYED RELEASE ORAL at 05:31

## 2018-05-24 RX ADMIN — IPRATROPIUM BROMIDE AND ALBUTEROL SULFATE SCH VIAL: .5; 3 SOLUTION RESPIRATORY (INHALATION) at 11:01

## 2018-05-24 RX ADMIN — METHYLPREDNISOLONE SODIUM SUCCINATE SCH MG: 125 INJECTION, POWDER, FOR SOLUTION INTRAMUSCULAR; INTRAVENOUS at 12:30

## 2018-05-24 RX ADMIN — HYDROCODONE POLISTIREX AND CHLORPHENIRAMINE POLISTIREX SCH ML: 10; 8 SUSPENSION, EXTENDED RELEASE ORAL at 09:16

## 2018-05-24 RX ADMIN — METHYLPREDNISOLONE SODIUM SUCCINATE SCH MG: 125 INJECTION, POWDER, FOR SOLUTION INTRAMUSCULAR; INTRAVENOUS at 20:15

## 2018-05-24 RX ADMIN — ENOXAPARIN SODIUM SCH MG: 30 INJECTION SUBCUTANEOUS at 09:10

## 2018-05-24 RX ADMIN — Medication PRN SYR: at 22:36

## 2018-05-24 RX ADMIN — DORZOLAMIDE HYDROCHLORIDE AND TIMOLOL MALEATE SCH: 20; 5 SOLUTION/ DROPS OPHTHALMIC at 20:26

## 2018-05-24 RX ADMIN — GUAIFENESIN SCH MG: 600 TABLET, EXTENDED RELEASE ORAL at 09:11

## 2018-05-24 RX ADMIN — MEPERIDINE HYDROCHLORIDE PRN MG: 50 INJECTION INTRAMUSCULAR; INTRAVENOUS; SUBCUTANEOUS at 17:53

## 2018-05-24 RX ADMIN — ONDANSETRON HYDROCHLORIDE SCH MG: 4 TABLET, FILM COATED ORAL at 20:25

## 2018-05-24 RX ADMIN — FERROUS SULFATE TAB EC 324 MG (65 MG FE EQUIVALENT) SCH MG: 324 (65 FE) TABLET DELAYED RESPONSE at 09:11

## 2018-05-24 RX ADMIN — ONDANSETRON HYDROCHLORIDE SCH MG: 4 TABLET, FILM COATED ORAL at 16:21

## 2018-05-24 RX ADMIN — CEFEPIME HYDROCHLORIDE SCH MLS/HR: 2 INJECTION, POWDER, FOR SOLUTION INTRAVENOUS at 09:47

## 2018-05-24 RX ADMIN — Medication PRN SYR: at 20:15

## 2018-05-24 RX ADMIN — CEFEPIME HYDROCHLORIDE SCH MLS/HR: 2 INJECTION, POWDER, FOR SOLUTION INTRAVENOUS at 21:21

## 2018-05-24 RX ADMIN — METRONIDAZOLE SCH MLS/HR: 500 INJECTION, SOLUTION INTRAVENOUS at 20:14

## 2018-05-24 RX ADMIN — TRAVOPROST SCH DROP: 0.04 SOLUTION/ DROPS OPHTHALMIC at 20:25

## 2018-05-24 RX ADMIN — METRONIDAZOLE SCH MLS/HR: 500 INJECTION, SOLUTION INTRAVENOUS at 12:27

## 2018-05-24 RX ADMIN — Medication SCH EACH: at 09:12

## 2018-05-24 RX ADMIN — GUAIFENESIN SCH MG: 600 TABLET, EXTENDED RELEASE ORAL at 20:25

## 2018-05-24 RX ADMIN — HYDROCODONE POLISTIREX AND CHLORPHENIRAMINE POLISTIREX SCH ML: 10; 8 SUSPENSION, EXTENDED RELEASE ORAL at 20:25

## 2018-05-24 RX ADMIN — MEPERIDINE HYDROCHLORIDE PRN MG: 50 INJECTION INTRAMUSCULAR; INTRAVENOUS; SUBCUTANEOUS at 05:36

## 2018-05-24 RX ADMIN — LISINOPRIL SCH MG: 10 TABLET ORAL at 20:25

## 2018-05-25 RX ADMIN — VANCOMYCIN HYDROCHLORIDE SCH: 125 CAPSULE ORAL at 14:23

## 2018-05-25 RX ADMIN — LEVALBUTEROL HYDROCHLORIDE SCH VIAL: 1.25 SOLUTION RESPIRATORY (INHALATION) at 23:05

## 2018-05-25 RX ADMIN — ENOXAPARIN SODIUM SCH MG: 30 INJECTION SUBCUTANEOUS at 09:26

## 2018-05-25 RX ADMIN — POLYETHYLENE GLYCOL 3350 SCH GM: 17 POWDER, FOR SOLUTION ORAL at 09:21

## 2018-05-25 RX ADMIN — HYDROCODONE POLISTIREX AND CHLORPHENIRAMINE POLISTIREX SCH: 10; 8 SUSPENSION, EXTENDED RELEASE ORAL at 09:36

## 2018-05-25 RX ADMIN — METRONIDAZOLE SCH MLS/HR: 500 INJECTION, SOLUTION INTRAVENOUS at 13:33

## 2018-05-25 RX ADMIN — DORZOLAMIDE HYDROCHLORIDE AND TIMOLOL MALEATE SCH: 20; 5 SOLUTION/ DROPS OPHTHALMIC at 20:06

## 2018-05-25 RX ADMIN — LEVALBUTEROL HYDROCHLORIDE SCH VIAL: 1.25 SOLUTION RESPIRATORY (INHALATION) at 01:08

## 2018-05-25 RX ADMIN — PANTOPRAZOLE SODIUM SCH MG: 40 TABLET, DELAYED RELEASE ORAL at 05:34

## 2018-05-25 RX ADMIN — GUAIFENESIN SCH MG: 600 TABLET, EXTENDED RELEASE ORAL at 09:22

## 2018-05-25 RX ADMIN — ASPIRIN SCH MG: 81 TABLET, COATED ORAL at 09:22

## 2018-05-25 RX ADMIN — MEPERIDINE HYDROCHLORIDE PRN MG: 50 INJECTION INTRAMUSCULAR; INTRAVENOUS; SUBCUTANEOUS at 15:35

## 2018-05-25 RX ADMIN — CLOPIDOGREL SCH MG: 75 TABLET, FILM COATED ORAL at 09:22

## 2018-05-25 RX ADMIN — METHYLPREDNISOLONE SODIUM SUCCINATE SCH MG: 125 INJECTION, POWDER, FOR SOLUTION INTRAMUSCULAR; INTRAVENOUS at 05:34

## 2018-05-25 RX ADMIN — CEFEPIME HYDROCHLORIDE SCH MLS/HR: 2 INJECTION, POWDER, FOR SOLUTION INTRAVENOUS at 08:00

## 2018-05-25 RX ADMIN — HYDROCODONE BITARTRATE AND ACETAMINOPHEN PRN TAB: 7.5; 325 TABLET ORAL at 23:48

## 2018-05-25 RX ADMIN — LEVALBUTEROL HYDROCHLORIDE SCH VIAL: 1.25 SOLUTION RESPIRATORY (INHALATION) at 11:07

## 2018-05-25 RX ADMIN — LISINOPRIL SCH MG: 10 TABLET ORAL at 09:21

## 2018-05-25 RX ADMIN — HYDROCODONE BITARTRATE AND ACETAMINOPHEN PRN TAB: 7.5; 325 TABLET ORAL at 09:21

## 2018-05-25 RX ADMIN — RANITIDINE SCH MG: 150 TABLET ORAL at 05:34

## 2018-05-25 RX ADMIN — ONDANSETRON HYDROCHLORIDE SCH MG: 4 TABLET, FILM COATED ORAL at 09:22

## 2018-05-25 RX ADMIN — METHYLPREDNISOLONE SODIUM SUCCINATE SCH MG: 125 INJECTION, POWDER, FOR SOLUTION INTRAMUSCULAR; INTRAVENOUS at 22:58

## 2018-05-25 RX ADMIN — NYSTATIN SCH ML: 500000 SUSPENSION ORAL at 20:03

## 2018-05-25 RX ADMIN — ONDANSETRON HYDROCHLORIDE SCH: 4 TABLET, FILM COATED ORAL at 13:34

## 2018-05-25 RX ADMIN — HYDROCODONE POLISTIREX AND CHLORPHENIRAMINE POLISTIREX SCH ML: 10; 8 SUSPENSION, EXTENDED RELEASE ORAL at 20:03

## 2018-05-25 RX ADMIN — Medication SCH MG: at 20:03

## 2018-05-25 RX ADMIN — FERROUS SULFATE TAB EC 324 MG (65 MG FE EQUIVALENT) SCH MG: 324 (65 FE) TABLET DELAYED RESPONSE at 09:22

## 2018-05-25 RX ADMIN — LEVALBUTEROL HYDROCHLORIDE SCH VIAL: 1.25 SOLUTION RESPIRATORY (INHALATION) at 17:00

## 2018-05-25 RX ADMIN — METRONIDAZOLE SCH MLS/HR: 500 INJECTION, SOLUTION INTRAVENOUS at 05:34

## 2018-05-25 RX ADMIN — VANCOMYCIN HYDROCHLORIDE SCH MG: 125 CAPSULE ORAL at 23:48

## 2018-05-25 RX ADMIN — LEVALBUTEROL HYDROCHLORIDE SCH VIAL: 1.25 SOLUTION RESPIRATORY (INHALATION) at 04:35

## 2018-05-25 RX ADMIN — TRAVOPROST SCH DROP: 0.04 SOLUTION/ DROPS OPHTHALMIC at 20:03

## 2018-05-25 RX ADMIN — VANCOMYCIN HYDROCHLORIDE SCH MG: 125 CAPSULE ORAL at 18:33

## 2018-05-25 RX ADMIN — DORZOLAMIDE HYDROCHLORIDE AND TIMOLOL MALEATE SCH: 20; 5 SOLUTION/ DROPS OPHTHALMIC at 09:36

## 2018-05-25 RX ADMIN — Medication SCH MG: at 09:21

## 2018-05-25 RX ADMIN — METRONIDAZOLE SCH MLS/HR: 500 INJECTION, SOLUTION INTRAVENOUS at 20:05

## 2018-05-25 RX ADMIN — SODIUM CHLORIDE SCH: 0.9 INJECTION, SOLUTION INTRAVENOUS at 19:07

## 2018-05-25 RX ADMIN — Medication SCH EACH: at 09:35

## 2018-05-25 RX ADMIN — METHYLPREDNISOLONE SODIUM SUCCINATE SCH MG: 125 INJECTION, POWDER, FOR SOLUTION INTRAMUSCULAR; INTRAVENOUS at 13:33

## 2018-05-25 RX ADMIN — PANTOPRAZOLE SODIUM SCH MG: 40 TABLET, DELAYED RELEASE ORAL at 18:35

## 2018-05-25 RX ADMIN — VANCOMYCIN HYDROCHLORIDE SCH MG: 125 CAPSULE ORAL at 12:52

## 2018-05-25 RX ADMIN — NYSTATIN SCH ML: 500000 SUSPENSION ORAL at 18:28

## 2018-05-25 RX ADMIN — Medication SCH: at 15:38

## 2018-05-25 RX ADMIN — ESCITALOPRAM SCH MG: 10 TABLET, FILM COATED ORAL at 09:22

## 2018-05-25 NOTE — PN
DATE OF SERVICE:  05/24/18



SUBJECTIVE: 

The patient was admitted from the emergency for the right lung pneumonia, upper
, middle and lower with constipation and obstipation. 



REVIEW OF SYSTEMS:  

CONSTITUTIONAL: No fever, no chills.  

HEENT:  Normal.

ENDOCRINE: No weight gain, no weight loss.

CVS:  No angina symptoms. No CHF symptoms.  No palpitations.  No atypical chest 
pain for CAD.  No shortness of breath. No PND, no orthopnea. 

RESPIRATORY:  Cough, no hemoptysis. 

GI:  No nausea, no vomiting.  Abdominal pain. 

:  No hematuria. No polyuria. 

MUSCULOSKELETAL:  No joint swelling. 

PSYCHIATRIC: Not anxious. No depression. No suicidal thoughts. No homicidal 
thoughts. 

SKIN: Intact. No rash. 



PHYSICAL EXAMINATION:  

V/S: Blood pressure 102/70, respiratory rate 20, heart rate 112, temperature 
97.9 with saturation 95%.

HEENT: Normocephalic, atraumatic. Mucosa dry. Pallor positive. No icterus. 

NECK:  Supple.  No JVD, no carotid bruit. No lymphadenopathy.

LUNGS: Decreased and basilar crackles. Clear to auscultation. No rales or 
rhonchi. 

HEART:  S1, S2 normal. No S3. No murmur, gallop or regurgitation. 

ABDOMEN: Soft, nontender. Bowel sounds sluggish. No rigidity. No rebound or 
guarding. No CVA tenderness. 

EXTREMITIES:   No cyanosis, clubbing or pedal edema.

MUSCULOSKELETAL:  No joint swelling. 

NEUROLOGIC:  Awake, alert, oriented times three.  No focal deficit. 

LYMPHATIC: No lymph nodes palpable. 

SKIN: Intact.



LABS:

WBC 11.31, hgb 10.3, hct 31.6, plt count 130, sodium 131, potassium 4.2, 
chloride 96, bicarb 18, BUN 26, creatinine 1.70 and glucose 219.



ASSESSMENT: 

1.  Acute abdominal pain 

2.  Elevated amylase 

3.  Constipation 

4.  Right sided pneumonia, right upper, lower and middle lobe 

5.  Right upper lobe lung cancer, status post chemo and radiation 

6.  COPD 

7.  Hypertension 

8.  Dyslipidemia 



PLAN:

1.  Continue Flagyl 

2.  Vancomycin 

3.  Zosyn 

4.  Daily I&O's 

5.  Solu-Medrol 



TIME SPENT: More than 35 minutes
MTDD

## 2018-05-25 NOTE — CT
EXAM:  CT of the abdomen and pelvis without contrast 

  

History:  Abdominal pain. 

  

Comparison:  CT abdomen pelvis 05/23/2018 

  

Technique:  Multiplanar CT images through the abdomen pelvis were obtained without the administration
 of IV contrast 

  

Findings: Subsegmental atelectasis seen at the lung bases.  There is bronchial wall thickening within
 the lower lungs.  No acute osseous abnormalities. Severe degenerative disc disease at L3-L4. 

  

Gallbladder is distended.  No focal liver or splenic lesions.  Mild stranding seen adjacent to the pa
ncreatic tail.  Adrenal glands are unremarkable.  The left kidney is smaller than the right.  No hydr
onephrosis.  No change in the abdominal aortic aneurysm.  The appendix is not dilated or inflamed.  F
luid seen in the stomach.  A few borderline dilated fluid-filled loops of small bowel.  There is wall
 thickening of the transverse colon adjacent inflammation.  No free air.  Rodriguez catheter in a decompr
essed bladder. 

  

Impression: 

1. Colitis of the transverse colon. 

2.  Mildly dilated fluid-filled loops of small bowel probably due to enteritis or ileus.  A partial s
mall bowel obstruction is considered less likely but not excluded. 

3.  Distended gallbladder.  Recommend further evaluation with abdominal ultrasound. 

4.  No change in the abdominal aortic aneurysm. 

5.  Mild stranding adjacent to the pancreatic tail.  Correlate with pancreatic enzymes.

## 2018-05-26 RX ADMIN — MEPERIDINE HYDROCHLORIDE PRN MG: 50 INJECTION INTRAMUSCULAR; INTRAVENOUS; SUBCUTANEOUS at 07:36

## 2018-05-26 RX ADMIN — TRAVOPROST SCH DROP: 0.04 SOLUTION/ DROPS OPHTHALMIC at 21:35

## 2018-05-26 RX ADMIN — ESCITALOPRAM SCH MG: 10 TABLET, FILM COATED ORAL at 08:32

## 2018-05-26 RX ADMIN — Medication SCH MG: at 08:21

## 2018-05-26 RX ADMIN — METHYLPREDNISOLONE SODIUM SUCCINATE SCH MG: 125 INJECTION, POWDER, FOR SOLUTION INTRAMUSCULAR; INTRAVENOUS at 13:00

## 2018-05-26 RX ADMIN — HYDROCODONE POLISTIREX AND CHLORPHENIRAMINE POLISTIREX SCH ML: 10; 8 SUSPENSION, EXTENDED RELEASE ORAL at 21:36

## 2018-05-26 RX ADMIN — HYDROCODONE POLISTIREX AND CHLORPHENIRAMINE POLISTIREX SCH ML: 10; 8 SUSPENSION, EXTENDED RELEASE ORAL at 08:21

## 2018-05-26 RX ADMIN — VANCOMYCIN HYDROCHLORIDE SCH MG: 125 CAPSULE ORAL at 06:35

## 2018-05-26 RX ADMIN — CLOPIDOGREL SCH MG: 75 TABLET, FILM COATED ORAL at 08:21

## 2018-05-26 RX ADMIN — METHYLPREDNISOLONE SODIUM SUCCINATE SCH MG: 125 INJECTION, POWDER, FOR SOLUTION INTRAMUSCULAR; INTRAVENOUS at 04:42

## 2018-05-26 RX ADMIN — METRONIDAZOLE SCH MLS/HR: 500 INJECTION, SOLUTION INTRAVENOUS at 04:42

## 2018-05-26 RX ADMIN — NYSTATIN SCH ML: 500000 SUSPENSION ORAL at 21:36

## 2018-05-26 RX ADMIN — CEFEPIME HYDROCHLORIDE SCH MLS/HR: 2 INJECTION, POWDER, FOR SOLUTION INTRAVENOUS at 08:33

## 2018-05-26 RX ADMIN — ASPIRIN SCH MG: 81 TABLET, COATED ORAL at 08:21

## 2018-05-26 RX ADMIN — NYSTATIN SCH ML: 500000 SUSPENSION ORAL at 16:31

## 2018-05-26 RX ADMIN — LEVALBUTEROL HYDROCHLORIDE SCH VIAL: 1.25 SOLUTION RESPIRATORY (INHALATION) at 11:06

## 2018-05-26 RX ADMIN — LEVALBUTEROL HYDROCHLORIDE SCH VIAL: 1.25 SOLUTION RESPIRATORY (INHALATION) at 05:22

## 2018-05-26 RX ADMIN — ENOXAPARIN SODIUM SCH MG: 30 INJECTION SUBCUTANEOUS at 08:22

## 2018-05-26 RX ADMIN — HYDROCODONE BITARTRATE AND ACETAMINOPHEN PRN TAB: 7.5; 325 TABLET ORAL at 18:51

## 2018-05-26 RX ADMIN — VANCOMYCIN HYDROCHLORIDE SCH MG: 125 CAPSULE ORAL at 12:59

## 2018-05-26 RX ADMIN — DORZOLAMIDE HYDROCHLORIDE AND TIMOLOL MALEATE SCH: 20; 5 SOLUTION/ DROPS OPHTHALMIC at 21:37

## 2018-05-26 RX ADMIN — MEPERIDINE HYDROCHLORIDE PRN MG: 50 INJECTION INTRAMUSCULAR; INTRAVENOUS; SUBCUTANEOUS at 13:34

## 2018-05-26 RX ADMIN — Medication SCH MG: at 16:31

## 2018-05-26 RX ADMIN — DORZOLAMIDE HYDROCHLORIDE AND TIMOLOL MALEATE SCH: 20; 5 SOLUTION/ DROPS OPHTHALMIC at 08:37

## 2018-05-26 RX ADMIN — Medication SCH MG: at 21:35

## 2018-05-26 RX ADMIN — PANTOPRAZOLE SODIUM SCH MG: 40 TABLET, DELAYED RELEASE ORAL at 06:08

## 2018-05-26 RX ADMIN — PANTOPRAZOLE SODIUM SCH MG: 40 TABLET, DELAYED RELEASE ORAL at 16:31

## 2018-05-26 RX ADMIN — SODIUM CHLORIDE SCH: 0.9 INJECTION, SOLUTION INTRAVENOUS at 13:32

## 2018-05-26 RX ADMIN — METRONIDAZOLE SCH MLS/HR: 500 INJECTION, SOLUTION INTRAVENOUS at 12:59

## 2018-05-26 RX ADMIN — NYSTATIN SCH ML: 500000 SUSPENSION ORAL at 06:08

## 2018-05-26 RX ADMIN — NYSTATIN SCH ML: 500000 SUSPENSION ORAL at 11:58

## 2018-05-26 RX ADMIN — POLYETHYLENE GLYCOL 3350 SCH GM: 17 POWDER, FOR SOLUTION ORAL at 08:21

## 2018-05-26 RX ADMIN — HYDROCODONE BITARTRATE AND ACETAMINOPHEN PRN TAB: 7.5; 325 TABLET ORAL at 11:54

## 2018-05-26 RX ADMIN — ALPRAZOLAM PRN MG: 0.5 TABLET ORAL at 19:18

## 2018-05-26 RX ADMIN — LEVALBUTEROL HYDROCHLORIDE SCH VIAL: 1.25 SOLUTION RESPIRATORY (INHALATION) at 17:00

## 2018-05-26 RX ADMIN — METHYLPREDNISOLONE SODIUM SUCCINATE SCH MG: 125 INJECTION, POWDER, FOR SOLUTION INTRAMUSCULAR; INTRAVENOUS at 21:36

## 2018-05-26 RX ADMIN — MEPERIDINE HYDROCHLORIDE PRN MG: 50 INJECTION INTRAMUSCULAR; INTRAVENOUS; SUBCUTANEOUS at 20:46

## 2018-05-27 RX ADMIN — NYSTATIN SCH ML: 500000 SUSPENSION ORAL at 17:10

## 2018-05-27 RX ADMIN — MEPERIDINE HYDROCHLORIDE PRN MG: 50 INJECTION INTRAMUSCULAR; INTRAVENOUS; SUBCUTANEOUS at 10:40

## 2018-05-27 RX ADMIN — DORZOLAMIDE HYDROCHLORIDE AND TIMOLOL MALEATE SCH: 20; 5 SOLUTION/ DROPS OPHTHALMIC at 08:44

## 2018-05-27 RX ADMIN — ALPRAZOLAM PRN MG: 0.5 TABLET ORAL at 17:14

## 2018-05-27 RX ADMIN — LEVALBUTEROL HYDROCHLORIDE SCH VIAL: 1.25 SOLUTION RESPIRATORY (INHALATION) at 11:00

## 2018-05-27 RX ADMIN — NYSTATIN SCH ML: 500000 SUSPENSION ORAL at 05:50

## 2018-05-27 RX ADMIN — MEPERIDINE HYDROCHLORIDE PRN MG: 50 INJECTION INTRAMUSCULAR; INTRAVENOUS; SUBCUTANEOUS at 04:50

## 2018-05-27 RX ADMIN — TRAVOPROST SCH DROP: 0.04 SOLUTION/ DROPS OPHTHALMIC at 20:31

## 2018-05-27 RX ADMIN — Medication SCH MG: at 08:43

## 2018-05-27 RX ADMIN — CARVEDILOL SCH MG: 3.12 TABLET, FILM COATED ORAL at 18:17

## 2018-05-27 RX ADMIN — CEFEPIME HYDROCHLORIDE SCH MLS/HR: 2 INJECTION, POWDER, FOR SOLUTION INTRAVENOUS at 08:43

## 2018-05-27 RX ADMIN — LEVALBUTEROL HYDROCHLORIDE SCH VIAL: 1.25 SOLUTION RESPIRATORY (INHALATION) at 18:15

## 2018-05-27 RX ADMIN — HYDROCODONE POLISTIREX AND CHLORPHENIRAMINE POLISTIREX SCH ML: 10; 8 SUSPENSION, EXTENDED RELEASE ORAL at 08:46

## 2018-05-27 RX ADMIN — NYSTATIN SCH ML: 500000 SUSPENSION ORAL at 11:13

## 2018-05-27 RX ADMIN — PANTOPRAZOLE SODIUM SCH MG: 40 TABLET, DELAYED RELEASE ORAL at 05:50

## 2018-05-27 RX ADMIN — DORZOLAMIDE HYDROCHLORIDE AND TIMOLOL MALEATE SCH: 20; 5 SOLUTION/ DROPS OPHTHALMIC at 20:32

## 2018-05-27 RX ADMIN — HYDROCODONE POLISTIREX AND CHLORPHENIRAMINE POLISTIREX SCH ML: 10; 8 SUSPENSION, EXTENDED RELEASE ORAL at 20:31

## 2018-05-27 RX ADMIN — LEVALBUTEROL HYDROCHLORIDE SCH VIAL: 1.25 SOLUTION RESPIRATORY (INHALATION) at 00:12

## 2018-05-27 RX ADMIN — HYDROCODONE BITARTRATE AND ACETAMINOPHEN PRN TAB: 7.5; 325 TABLET ORAL at 17:14

## 2018-05-27 RX ADMIN — POLYETHYLENE GLYCOL 3350 SCH GM: 17 POWDER, FOR SOLUTION ORAL at 08:43

## 2018-05-27 RX ADMIN — NYSTATIN SCH ML: 500000 SUSPENSION ORAL at 20:31

## 2018-05-27 RX ADMIN — ESCITALOPRAM SCH MG: 10 TABLET, FILM COATED ORAL at 08:43

## 2018-05-27 RX ADMIN — LEVALBUTEROL HYDROCHLORIDE SCH VIAL: 1.25 SOLUTION RESPIRATORY (INHALATION) at 05:35

## 2018-05-27 RX ADMIN — PANTOPRAZOLE SODIUM SCH MG: 40 TABLET, DELAYED RELEASE ORAL at 17:10

## 2018-05-27 RX ADMIN — METHYLPREDNISOLONE SODIUM SUCCINATE SCH MG: 125 INJECTION, POWDER, FOR SOLUTION INTRAMUSCULAR; INTRAVENOUS at 20:47

## 2018-05-27 RX ADMIN — Medication SCH MG: at 20:31

## 2018-05-27 RX ADMIN — LEVALBUTEROL HYDROCHLORIDE SCH VIAL: 1.25 SOLUTION RESPIRATORY (INHALATION) at 23:28

## 2018-05-27 RX ADMIN — Medication SCH MG: at 15:31

## 2018-05-27 RX ADMIN — METHYLPREDNISOLONE SODIUM SUCCINATE SCH MG: 125 INJECTION, POWDER, FOR SOLUTION INTRAMUSCULAR; INTRAVENOUS at 05:51

## 2018-05-27 RX ADMIN — METHYLPREDNISOLONE SODIUM SUCCINATE SCH MG: 125 INJECTION, POWDER, FOR SOLUTION INTRAMUSCULAR; INTRAVENOUS at 13:03

## 2018-05-27 NOTE — DI
EXAM:  Single view of the abdomen. 

  

History:  Ileus. 

  

Comparison:  CT abdomen pelvis 05/25/2018 

  

Findings:  Nonspecific but nonobstructive bowel gas pattern.  Stomach is moderately distended with ai
r.  No free intraperitoneal air.  No acute osseous abnormalities.  Degenerative changes of the lumbar
 spine.  Atherosclerotic vascular calcifications.  Scattered colonic stool. 

  

Impression:  Nonspecific but nonobstructive bowel gas pattern.

## 2018-05-28 RX ADMIN — NYSTATIN SCH ML: 500000 SUSPENSION ORAL at 20:16

## 2018-05-28 RX ADMIN — HYDROCODONE POLISTIREX AND CHLORPHENIRAMINE POLISTIREX SCH ML: 10; 8 SUSPENSION, EXTENDED RELEASE ORAL at 20:16

## 2018-05-28 RX ADMIN — Medication SCH MG: at 14:58

## 2018-05-28 RX ADMIN — PANTOPRAZOLE SODIUM SCH MG: 40 TABLET, DELAYED RELEASE ORAL at 16:51

## 2018-05-28 RX ADMIN — MEPERIDINE HYDROCHLORIDE PRN MG: 50 INJECTION INTRAMUSCULAR; INTRAVENOUS; SUBCUTANEOUS at 08:44

## 2018-05-28 RX ADMIN — LEVALBUTEROL HYDROCHLORIDE SCH VIAL: 1.25 SOLUTION RESPIRATORY (INHALATION) at 23:20

## 2018-05-28 RX ADMIN — DORZOLAMIDE HYDROCHLORIDE AND TIMOLOL MALEATE SCH: 20; 5 SOLUTION/ DROPS OPHTHALMIC at 08:44

## 2018-05-28 RX ADMIN — MEPERIDINE HYDROCHLORIDE PRN MG: 50 INJECTION INTRAMUSCULAR; INTRAVENOUS; SUBCUTANEOUS at 21:00

## 2018-05-28 RX ADMIN — DORZOLAMIDE HYDROCHLORIDE AND TIMOLOL MALEATE SCH: 20; 5 SOLUTION/ DROPS OPHTHALMIC at 20:16

## 2018-05-28 RX ADMIN — LEVALBUTEROL HYDROCHLORIDE SCH VIAL: 1.25 SOLUTION RESPIRATORY (INHALATION) at 05:00

## 2018-05-28 RX ADMIN — METHYLPREDNISOLONE SODIUM SUCCINATE SCH MG: 40 INJECTION, POWDER, FOR SOLUTION INTRAMUSCULAR; INTRAVENOUS at 13:31

## 2018-05-28 RX ADMIN — METHYLPREDNISOLONE SODIUM SUCCINATE SCH MG: 125 INJECTION, POWDER, FOR SOLUTION INTRAMUSCULAR; INTRAVENOUS at 05:55

## 2018-05-28 RX ADMIN — METHYLPREDNISOLONE SODIUM SUCCINATE SCH MG: 40 INJECTION, POWDER, FOR SOLUTION INTRAMUSCULAR; INTRAVENOUS at 21:00

## 2018-05-28 RX ADMIN — CEFEPIME HYDROCHLORIDE SCH MLS/HR: 2 INJECTION, POWDER, FOR SOLUTION INTRAVENOUS at 08:43

## 2018-05-28 RX ADMIN — CARVEDILOL SCH MG: 3.12 TABLET, FILM COATED ORAL at 08:43

## 2018-05-28 RX ADMIN — HYDROCODONE POLISTIREX AND CHLORPHENIRAMINE POLISTIREX SCH ML: 10; 8 SUSPENSION, EXTENDED RELEASE ORAL at 08:43

## 2018-05-28 RX ADMIN — NYSTATIN SCH ML: 500000 SUSPENSION ORAL at 11:06

## 2018-05-28 RX ADMIN — CARVEDILOL SCH MG: 6.25 TABLET, FILM COATED ORAL at 16:51

## 2018-05-28 RX ADMIN — LEVALBUTEROL HYDROCHLORIDE SCH VIAL: 1.25 SOLUTION RESPIRATORY (INHALATION) at 11:14

## 2018-05-28 RX ADMIN — Medication SCH MG: at 08:43

## 2018-05-28 RX ADMIN — POLYETHYLENE GLYCOL 3350 SCH GM: 17 POWDER, FOR SOLUTION ORAL at 08:43

## 2018-05-28 RX ADMIN — LEVALBUTEROL HYDROCHLORIDE SCH VIAL: 1.25 SOLUTION RESPIRATORY (INHALATION) at 17:11

## 2018-05-28 RX ADMIN — TRAVOPROST SCH DROP: 0.04 SOLUTION/ DROPS OPHTHALMIC at 20:16

## 2018-05-28 RX ADMIN — NYSTATIN SCH ML: 500000 SUSPENSION ORAL at 05:55

## 2018-05-28 RX ADMIN — Medication SCH MG: at 20:16

## 2018-05-28 RX ADMIN — NYSTATIN SCH ML: 500000 SUSPENSION ORAL at 16:51

## 2018-05-28 RX ADMIN — ALPRAZOLAM PRN MG: 0.5 TABLET ORAL at 20:59

## 2018-05-28 RX ADMIN — PANTOPRAZOLE SODIUM SCH MG: 40 TABLET, DELAYED RELEASE ORAL at 05:55

## 2018-05-28 RX ADMIN — ESCITALOPRAM SCH MG: 10 TABLET, FILM COATED ORAL at 08:44

## 2018-05-28 RX ADMIN — HYDROCODONE BITARTRATE AND ACETAMINOPHEN PRN TAB: 7.5; 325 TABLET ORAL at 17:47

## 2018-05-29 RX ADMIN — DORZOLAMIDE HYDROCHLORIDE AND TIMOLOL MALEATE SCH DROP: 20; 5 SOLUTION/ DROPS OPHTHALMIC at 22:13

## 2018-05-29 RX ADMIN — ALPRAZOLAM PRN MG: 0.5 TABLET ORAL at 17:13

## 2018-05-29 RX ADMIN — POLYETHYLENE GLYCOL 3350 SCH GM: 17 POWDER, FOR SOLUTION ORAL at 08:55

## 2018-05-29 RX ADMIN — Medication SCH MG: at 08:54

## 2018-05-29 RX ADMIN — PANTOPRAZOLE SODIUM SCH MG: 40 TABLET, DELAYED RELEASE ORAL at 17:03

## 2018-05-29 RX ADMIN — METHYLPREDNISOLONE SODIUM SUCCINATE SCH MG: 40 INJECTION, POWDER, FOR SOLUTION INTRAMUSCULAR; INTRAVENOUS at 22:13

## 2018-05-29 RX ADMIN — NYSTATIN SCH ML: 500000 SUSPENSION ORAL at 17:02

## 2018-05-29 RX ADMIN — CARVEDILOL SCH MG: 6.25 TABLET, FILM COATED ORAL at 17:03

## 2018-05-29 RX ADMIN — LEVALBUTEROL HYDROCHLORIDE SCH VIAL: 1.25 SOLUTION RESPIRATORY (INHALATION) at 11:07

## 2018-05-29 RX ADMIN — ESCITALOPRAM SCH MG: 10 TABLET, FILM COATED ORAL at 08:55

## 2018-05-29 RX ADMIN — METHYLPREDNISOLONE SODIUM SUCCINATE SCH MG: 40 INJECTION, POWDER, FOR SOLUTION INTRAMUSCULAR; INTRAVENOUS at 05:40

## 2018-05-29 RX ADMIN — LEVALBUTEROL HYDROCHLORIDE SCH VIAL: 1.25 SOLUTION RESPIRATORY (INHALATION) at 04:40

## 2018-05-29 RX ADMIN — NYSTATIN SCH ML: 500000 SUSPENSION ORAL at 11:38

## 2018-05-29 RX ADMIN — LEVALBUTEROL HYDROCHLORIDE SCH VIAL: 1.25 SOLUTION RESPIRATORY (INHALATION) at 22:25

## 2018-05-29 RX ADMIN — HYDROCODONE POLISTIREX AND CHLORPHENIRAMINE POLISTIREX SCH ML: 10; 8 SUSPENSION, EXTENDED RELEASE ORAL at 08:55

## 2018-05-29 RX ADMIN — CARVEDILOL SCH MG: 6.25 TABLET, FILM COATED ORAL at 08:55

## 2018-05-29 RX ADMIN — METHYLPREDNISOLONE SODIUM SUCCINATE SCH MG: 40 INJECTION, POWDER, FOR SOLUTION INTRAMUSCULAR; INTRAVENOUS at 13:45

## 2018-05-29 RX ADMIN — Medication SCH MG: at 22:13

## 2018-05-29 RX ADMIN — TRAVOPROST SCH DROP: 0.04 SOLUTION/ DROPS OPHTHALMIC at 22:13

## 2018-05-29 RX ADMIN — LEVALBUTEROL HYDROCHLORIDE SCH VIAL: 1.25 SOLUTION RESPIRATORY (INHALATION) at 17:38

## 2018-05-29 RX ADMIN — SODIUM CHLORIDE SCH MLS/HR: 0.9 INJECTION, SOLUTION INTRAVENOUS at 00:35

## 2018-05-29 RX ADMIN — HYDROCODONE BITARTRATE AND ACETAMINOPHEN PRN TAB: 7.5; 325 TABLET ORAL at 17:13

## 2018-05-29 RX ADMIN — Medication SCH MG: at 17:02

## 2018-05-29 RX ADMIN — NYSTATIN SCH ML: 500000 SUSPENSION ORAL at 05:41

## 2018-05-29 RX ADMIN — DORZOLAMIDE HYDROCHLORIDE AND TIMOLOL MALEATE SCH DROP: 20; 5 SOLUTION/ DROPS OPHTHALMIC at 08:56

## 2018-05-29 RX ADMIN — NYSTATIN SCH ML: 500000 SUSPENSION ORAL at 22:13

## 2018-05-29 RX ADMIN — CEFEPIME HYDROCHLORIDE SCH MLS/HR: 2 INJECTION, POWDER, FOR SOLUTION INTRAVENOUS at 08:55

## 2018-05-29 RX ADMIN — HYDROCODONE POLISTIREX AND CHLORPHENIRAMINE POLISTIREX SCH ML: 10; 8 SUSPENSION, EXTENDED RELEASE ORAL at 22:13

## 2018-05-29 RX ADMIN — PANTOPRAZOLE SODIUM SCH MG: 40 TABLET, DELAYED RELEASE ORAL at 05:41

## 2018-05-29 NOTE — PN
DATE OF SERVICE:  05/26/18



SUBJECTIVE: 

The patient is more awake and says that she is feeling good. Urine output is so 
far, I'll be seeing the patient around 1:00 so far since morning 7:00 been 
normal. Urine output is almost 345ml. The patient has been getting IV fluids at 
45ml per hour. Did get the blood work. BUN went up to 70 and creatinine is 
2.65. Hgb is 8.3. The patient's son is very happy that the patient is more 
awake and alert. Did explain that we have give the blood transfusion and 
reassured that we would do everything possible regarding the IV antibiotics and 
blood work wise. He still does not want to transfer the patient to the higher 
center and knowing the patient's poor prognosis. 



REVIEW OF SYSTEMS:  

CONSTITUTIONAL: No fever, no chills.  

HEENT:  Normal.

ENDOCRINE: No weight gain, no weight loss.

CVS:  No angina symptoms. No CHF symptoms.  No palpitations.  No atypical chest 
pain for CAD.  No shortness of breath. No PND, no orthopnea. 

RESPIRATORY:  No cough, no hemoptysis. 

GI:  No nausea, no vomiting.  No abdominal pain. 

:  No hematuria. No polyuria. 

MUSCULOSKELETAL:  No joint swelling. 

PSYCHIATRIC: Not anxious. No depression. No suicidal thoughts. No homicidal 
thoughts. 

SKIN: Intact. No rash. 



PHYSICAL EXAMINATION:  

V/S: Blood pressure 120/69, respiratory rate 20, heart rate 115 and temperature 
99.1 with saturation 93%.

HEENT: Normocephalic, atraumatic. Mucosa dry. Pallor positive. No icterus. 

NECK:  Supple.  No JVD, no carotid bruit. No lymphadenopathy.

LUNGS: Decreased and basilar crackles much more increase. Clear to 
auscultation. No rales or rhonchi. 

HEART:  S1, S2 normal. No S3. No murmur, gallop or regurgitation. 

ABDOMEN: Soft, nontender. Bowel sounds very sluggish. No rigidity. No rebound 
or guarding. No CVA tenderness. 

EXTREMITIES:   No cyanosis, clubbing or pedal edema.

MUSCULOSKELETAL:  No joint swelling. 

NEUROLOGIC:  Awake, alert, oriented times three. Very much active and says that 
I want you to come whenever I call you Dr. Bailey and I did agree for that.   
No focal deficit. 

LYMPHATIC: No lymph nodes palpable. 

SKIN: Intact.



ASSESSMENT: 

1.  Acute renal failure 

2.  Pancreatitis 

3.  Anemia with questionable GI bleed 

4.  Right upper, lower and middle lobe pneumonia 

5.  Colitis on the CAT scan

6.  Small bowel obstruction with antibiotics AVS

7.  Distended gallbladder

8.  Mild stranding adjacent to the tail of the pancreas. 

9.  History of colitis 

10.Pancreatitis 

11.Anemia possibly needing blood transfusion 



PLAN:

1.  Increase the increase the IV fluids to 70ml per hour during the day time 
and 42ml at night time

2.  Continue the Cefepime as pseudomonas grew in the sputum 

3.  Stop the Vancomycin PO and Flagyl IV 

4.  Stop the aspirin

5.  Lovenox been on hold last 24 hours 

6.  Did explain again about the poor outcome. The patient and family is 
agreeable and insisted on no transfer at this time. 



TIME SPENT: More than 35 minutes
MTDD

## 2018-05-29 NOTE — PN
DATE OF SERVICE:  05/25/18



SUBJECTIVE: 

The patient did have a change in status yesterday night, Nurse April called 
complaining that patient had been short of breath and gargling. Dose of Lasix 
was given. Today morning more lethargic. Abdominal pain is still present but no 
nausea or vomiting. 



REVIEW OF SYSTEMS:  

CONSTITUTIONAL: No fever, no chills.  

HEENT:  Normal.

ENDOCRINE: No weight gain, no weight loss.

CVS:  No angina symptoms. No CHF symptoms.  No palpitations.  No atypical chest 
pain for CAD.  No shortness of breath. No PND, no orthopnea. 

RESPIRATORY:  No cough, no hemoptysis. 

GI:  No nausea, no vomiting.  No abdominal pain. 

:  No hematuria. No polyuria. 

MUSCULOSKELETAL:  No joint swelling. 

PSYCHIATRIC: Not anxious. No depression. No suicidal thoughts. No homicidal 
thoughts. 

SKIN: Intact. No rash. 



PHYSICAL EXAMINATION:  

V/S: Blood pressure 121/72, respiratory 14, heart rate 114, temperature 97.8 
with saturation 92 on 2 liters. 

HEENT: Normocephalic, atraumatic. Mucosa dry. Pallor positive. No icterus. 

NECK:  Supple.  No JVD, no carotid bruit. No lymphadenopathy.

LUNGS: Decreased and basilar crackles. Right sided crackles are more than the 
left and the wheezing is present on the right side. Clear to auscultation. No 
rales or rhonchi. 

HEART:  S1, S2 normal. No S3. No murmur, gallop or regurgitation. 

ABDOMEN: Soft, abdominal tenderness is present. Bowel sounds sluggish. No 
rigidity. No rebound or guarding. No CVA tenderness. 

EXTREMITIES:   No cyanosis, clubbing or pedal edema.

MUSCULOSKELETAL:  No joint swelling. 

NEUROLOGIC:  Awake, alert, oriented times three. Wakes from the verbal stimuli 
and goes back to sleep.  No focal deficit. 

LYMPHATIC: No lymph nodes palpable. 

SKIN: Intact.



LABS:

WBC 11.48, hgb 10.7, hct 33.3, plt count 154, sodium 132, potassium 5.5, 
chloride 89, bicarb 21, BUN 42, creatinine 2.09, glucose 216.



ASSESSMENT: 

1.  Acute renal failure 

2.  Hyperkalemia 

3.  Abdominal pain with pancreatitis 

4.  Right upper, middle and lower lobe pneumonia 

5.  Colitis per the CAT scan 

6.  Acute renal failure 

7.  Anemia 



PLAN:

1.  Will stop the Vancomycin IV 

2.  Go with the Vancomycin PO in review of colitis and pancreatitis

3.  Will stop the Zosyn and change it to the Cefepime 

4.  In review of recent hospitalization and new pneumonia we will be treating 
it as a hospital acquired pneumonia and colitis will cover the anaerobic 
coverage. Did explain to the patient's son who is her power of  as the 
patient's admission is getting complicated. Offered if he wants to be 
transferred to the [facility] center like [facility] and to be seen by the patient's 
hematologist Dr. Marie. He did take some time and answered back saying no and 
I did explain the worsening effect that kidney function can get worse and given 
the patient's condition maybe not candidate for hemodialysis at this time. He 
verbalized understanding and he wanted to take a chance by keeping the patient 
here and patient being made DNR from [facility]. 



TIME SPENT: More than 35-45 minutes
MTDD

## 2018-05-29 NOTE — PN
DATE OF SERVICE:  05/25/18



SUBJECTIVE: 

The patient's condition over the day did deteriorate and was not able to talk. 
Repeat BUN and creatinine went up to 59 BUN and creatinine 2.60. Potassium is 
5.3. Again explained about the hemodialysis chance, refused the patient's to be 
transferred. The patient is agreeable for the worse outcome. All family members 
are here but by evening the patient was more awake, alert and talking. Took 
oral pain medication and was still having the abdominal pain. 



TIME SPENT: More than 35 minutes
MTDD

## 2018-05-30 RX ADMIN — POLYETHYLENE GLYCOL 3350 SCH GM: 17 POWDER, FOR SOLUTION ORAL at 08:26

## 2018-05-30 RX ADMIN — NYSTATIN SCH ML: 500000 SUSPENSION ORAL at 16:47

## 2018-05-30 RX ADMIN — Medication PRN SYR: at 20:04

## 2018-05-30 RX ADMIN — MEGESTROL ACETATE SCH MG: 40 SUSPENSION ORAL at 13:12

## 2018-05-30 RX ADMIN — NYSTATIN SCH ML: 500000 SUSPENSION ORAL at 13:12

## 2018-05-30 RX ADMIN — PANTOPRAZOLE SODIUM SCH MG: 40 TABLET, DELAYED RELEASE ORAL at 06:18

## 2018-05-30 RX ADMIN — Medication SCH MG: at 20:05

## 2018-05-30 RX ADMIN — DORZOLAMIDE HYDROCHLORIDE AND TIMOLOL MALEATE SCH DROP: 20; 5 SOLUTION/ DROPS OPHTHALMIC at 20:05

## 2018-05-30 RX ADMIN — PANTOPRAZOLE SODIUM SCH MG: 40 TABLET, DELAYED RELEASE ORAL at 16:47

## 2018-05-30 RX ADMIN — CEFEPIME HYDROCHLORIDE SCH MLS/HR: 2 INJECTION, POWDER, FOR SOLUTION INTRAVENOUS at 20:04

## 2018-05-30 RX ADMIN — METHYLPREDNISOLONE SODIUM SUCCINATE SCH MG: 40 INJECTION, POWDER, FOR SOLUTION INTRAMUSCULAR; INTRAVENOUS at 20:05

## 2018-05-30 RX ADMIN — LEVALBUTEROL HYDROCHLORIDE SCH VIAL: 1.25 SOLUTION RESPIRATORY (INHALATION) at 04:53

## 2018-05-30 RX ADMIN — HYDROCODONE POLISTIREX AND CHLORPHENIRAMINE POLISTIREX SCH ML: 10; 8 SUSPENSION, EXTENDED RELEASE ORAL at 20:04

## 2018-05-30 RX ADMIN — LEVALBUTEROL HYDROCHLORIDE SCH VIAL: 1.25 SOLUTION RESPIRATORY (INHALATION) at 11:11

## 2018-05-30 RX ADMIN — NYSTATIN SCH ML: 500000 SUSPENSION ORAL at 20:05

## 2018-05-30 RX ADMIN — ESCITALOPRAM SCH MG: 10 TABLET, FILM COATED ORAL at 08:27

## 2018-05-30 RX ADMIN — HYDROCODONE BITARTRATE AND ACETAMINOPHEN PRN TAB: 7.5; 325 TABLET ORAL at 20:05

## 2018-05-30 RX ADMIN — LEVALBUTEROL HYDROCHLORIDE SCH VIAL: 1.25 SOLUTION RESPIRATORY (INHALATION) at 23:20

## 2018-05-30 RX ADMIN — Medication SCH MG: at 08:27

## 2018-05-30 RX ADMIN — CARVEDILOL SCH MG: 6.25 TABLET, FILM COATED ORAL at 08:27

## 2018-05-30 RX ADMIN — METHYLPREDNISOLONE SODIUM SUCCINATE SCH MG: 40 INJECTION, POWDER, FOR SOLUTION INTRAMUSCULAR; INTRAVENOUS at 15:10

## 2018-05-30 RX ADMIN — ALPRAZOLAM PRN MG: 0.5 TABLET ORAL at 07:32

## 2018-05-30 RX ADMIN — SODIUM CHLORIDE SCH MLS/HR: 0.9 INJECTION, SOLUTION INTRAVENOUS at 00:33

## 2018-05-30 RX ADMIN — NYSTATIN SCH ML: 500000 SUSPENSION ORAL at 06:21

## 2018-05-30 RX ADMIN — HYDROCODONE POLISTIREX AND CHLORPHENIRAMINE POLISTIREX SCH ML: 10; 8 SUSPENSION, EXTENDED RELEASE ORAL at 08:26

## 2018-05-30 RX ADMIN — DORZOLAMIDE HYDROCHLORIDE AND TIMOLOL MALEATE SCH DROP: 20; 5 SOLUTION/ DROPS OPHTHALMIC at 08:27

## 2018-05-30 RX ADMIN — CEFEPIME HYDROCHLORIDE SCH MLS/HR: 2 INJECTION, POWDER, FOR SOLUTION INTRAVENOUS at 08:26

## 2018-05-30 RX ADMIN — LEVALBUTEROL HYDROCHLORIDE SCH VIAL: 1.25 SOLUTION RESPIRATORY (INHALATION) at 16:40

## 2018-05-30 RX ADMIN — TRAVOPROST SCH DROP: 0.04 SOLUTION/ DROPS OPHTHALMIC at 20:04

## 2018-05-30 RX ADMIN — ALPRAZOLAM PRN MG: 0.5 TABLET ORAL at 20:05

## 2018-05-30 RX ADMIN — CARVEDILOL SCH MG: 6.25 TABLET, FILM COATED ORAL at 16:47

## 2018-05-30 RX ADMIN — METHYLPREDNISOLONE SODIUM SUCCINATE SCH MG: 40 INJECTION, POWDER, FOR SOLUTION INTRAMUSCULAR; INTRAVENOUS at 06:19

## 2018-05-30 RX ADMIN — Medication SCH MG: at 16:47

## 2018-05-30 NOTE — DI
EXAM:  Single view of the chest. 

  

History:  Follow-up pneumonia, cough. 

  

Comparison:  Chest radiograph 05/08/2018, chest CT 05/23/2018 

  

Findings:  Heart size is normal.  Left central line seen in place.  Persistent large cavitary lesion 
within the right upper hemithorax.  Developing left lower lobe infiltrate.  There may be a small left
 pleural effusion.  No pneumothorax.  No acute osseous abnormalities.  Atherosclerotic vascular calci
fications. 

  

Impression: 

1.  Left lower lobe pneumonia. 

2.  No change in the large right upper lobe cavitary lesion.

## 2018-05-30 NOTE — PN
DATE OF SERVICE:  05/27/18



SUBJECTIVE: 

The patient is admitted with left-sided pneumonia and acute pancreatitis. The 
patient is feeling better. Urine output is good. Hemoglobin up to 8.6 from 7.5, 
two units blood transfusion given with consent. Hemoglobin is 10.5. BUN and 
creatinine are improving. Still hurting in the abdomen. No nausea or vomiting. 
   



REVIEW OF SYSTEMS: 

CONSTITUTIONAL: No fever, no chills.  

HEENT:  Normal.

ENDOCRINE: No weight gain, no weight loss.

CVS:  No angina symptoms. No CHF symptoms.  No palpitations.  No atypical chest 
pain for CAD.  No shortness of breath. No PND, no orthopnea. 

RESPIRATORY:  No cough, no hemoptysis. 

GI:  No nausea, no vomiting.  Positive for abdominal pain. 

:  No hematuria. No polyuria. 

MUSCULOSKELETAL:  No joint swelling. 

PSYCHIATRIC: Not anxious. No depression. No suicidal thoughts. No homicidal 
thoughts. 

SKIN: Intact. No rash. 



PHYSICAL EXAMINATION:  

V/S: /74, respiratory rate 22, temperature 98, saturation 94. 

HEENT: Normocephalic, atraumatic. Mucosa dry. Pallor positive. No icterus.

NECK:  Supple.  No JVD, no carotid bruit. No lymphadenopathy.

LUNGS:  Decreased with fine crackles.  

HEART:  S1, S2 normal. No S3. No murmur, gallop or regurgitation. 

ABDOMEN: Soft, tender to touch. Bowel sounds are very sluggish. No rigidity. No 
rebound or guarding. No CVA tenderness. 

EXTREMITIES:   No cyanosis, clubbing or pedal edema.

MUSCULOSKELETAL:  No joint swelling. 

NEUROLOGIC: Awake, alert, oriented times three.  No focal deficit. 

LYMPHATIC: No lymph nodes palpable. 

SKIN: Intact.



LABS:

White count 14.05, hemoglobin 10.5, hematocrit 31.2, platelet count 66. Sodium 
139, potassium 4.4, chloride 103, bicarb 22, BUN 56, creatinine 1.94, glucose 
144. 



ASSESSMENT: 

1.  RIGHT LUNG PNEUMONIA, UPPER, MIDDLE AND LOWER LOBE

2.  PANCREATITIS

3.  COLITIS

4.  ACUTE RENAL FAILURE WHICH IS IMPROVING

5.  ANEMIA NEEDING BLOOD TRANSFUSION

6.  HISTORY OF LUNG CANCER STATUS POST CHEMOTHERAPY AND RADIATION

7.  PANCREATITIS

8.  COLITIS

9.  CONSTIPATION



PLAN:

1.  Continue Cefepime

2.  IV fluids

3.  H & H

4.  Mag Citrate



TIME SPENT: More than 35 minutes
MTDD

## 2018-05-30 NOTE — PN
DATE OF SERVICE:  05/28/18



SUBJECTIVE: 

The patient is sitting in the chair feeling a lot better, coughing some, moving 
gas, unable to take two steps. She is taking soft diet well. Amylase and lipase 
is getting better. Amylase is 222 today. BUN and creatinine have improved, 51 
and 1.35. She has not had any bowel movement today. 



REVIEW OF SYSTEMS:  

CONSTITUTIONAL: No fever, no chills.  

HEENT:  Normal.

ENDOCRINE: No weight gain, no weight loss.

CVS:  No angina symptoms. No CHF symptoms.  No palpitations.  No atypical chest 
pain for CAD.  No shortness of breath. No PND, no orthopnea. 

RESPIRATORY:  Cough. No hemoptysis. 

GI:  No nausea, no vomiting.  No abdominal pain. 

:  No hematuria. No polyuria. 

MUSCULOSKELETAL:  No joint swelling. 

PSYCHIATRIC: Not anxious. No depression. No suicidal thoughts. No homicidal 
thoughts. 

SKIN: Intact. No rash. 



PHYSICAL EXAMINATION:  

V/S: /84, respiratory rate 22, heart rate 109, respiratory rate is 97.6, 
saturation 89 on 2L. 

HEENT: Normocephalic, atraumatic. Mucosa dry, pallor positive. No icterus. 

NECK:  Supple.  No JVD, no carotid bruit. No lymphadenopathy.

LUNGS: Decreased entry with basilar crackles.  

HEART:  S1, S2 normal. No S3. No murmur, gallop or regurgitation. 

ABDOMEN: Soft, nontender. Bowel sounds are very sluggish. No rigidity. No 
rebound or guarding. No CVA tenderness. 

EXTREMITIES:   No cyanosis, clubbing or pedal edema.

MUSCULOSKELETAL:  No joint swelling. 

NEUROLOGIC:  Awake, alert, oriented times three.  No focal deficit. 

LYMPHATIC: No lymph nodes palpable. 

SKIN: Intact.



LABS:

Sodium 139, potassium 4.4, chloride 103, bicarb 22, BUN 51, creatinine 1.35, 
glucose 170. White count 17.07, hemoglobin 11.6, hematocrit 36.1, platelet 
count 72.  



ASSESSMENT: 

1.  RIGHT-SIDED PNEUMONIA, UPPER, MIDDLE AND LOWER LOBE PER CT SCAN

2.  ACUTE PANCREATITIS

3.  AMYLASE, LIPASE IS BETTER

4.  COLITIS PER CT SCAN

5.  CONSTIPATION

6.  HISTORY OF LUNG CANCER

7.  ANEMIA NEEDING BLOOD TRANSFUSION

8.  HYPERTENSION

9.  COPD

10. THROMBOCYTOPENIA



PLAN:

1.  Continue antibiotic Cefepime

2.  IV fluids at 70/mL/hr during the daytime

3.  Daily I & O's

4.  Demerol for pain





TIME SPENT: More than 35 minutes
MTDD

## 2018-05-31 ENCOUNTER — HOSPITAL ENCOUNTER (INPATIENT)
Dept: HOSPITAL 58 - MEDSURG B | Age: 73
LOS: 2 days | DRG: 189 | End: 2018-06-02
Attending: INTERNAL MEDICINE | Admitting: INTERNAL MEDICINE
Payer: COMMERCIAL

## 2018-05-31 VITALS — TEMPERATURE: 98.2 F | SYSTOLIC BLOOD PRESSURE: 149 MMHG | DIASTOLIC BLOOD PRESSURE: 73 MMHG

## 2018-05-31 VITALS — BODY MASS INDEX: 17.9 KG/M2

## 2018-05-31 DIAGNOSIS — Z79.2: ICD-10-CM

## 2018-05-31 DIAGNOSIS — F17.200: ICD-10-CM

## 2018-05-31 DIAGNOSIS — J44.9: ICD-10-CM

## 2018-05-31 DIAGNOSIS — J18.9: ICD-10-CM

## 2018-05-31 DIAGNOSIS — I10: ICD-10-CM

## 2018-05-31 DIAGNOSIS — J96.90: Primary | ICD-10-CM

## 2018-05-31 DIAGNOSIS — R10.9: ICD-10-CM

## 2018-05-31 DIAGNOSIS — K85.90: ICD-10-CM

## 2018-05-31 DIAGNOSIS — C34.90: ICD-10-CM

## 2018-05-31 DIAGNOSIS — Z79.899: ICD-10-CM

## 2018-05-31 PROCEDURE — 94640 AIRWAY INHALATION TREATMENT: CPT

## 2018-05-31 RX ADMIN — HYDROCODONE BITARTRATE AND ACETAMINOPHEN PRN TAB: 7.5; 325 TABLET ORAL at 16:44

## 2018-05-31 RX ADMIN — NYSTATIN SCH ML: 500000 SUSPENSION ORAL at 16:44

## 2018-05-31 RX ADMIN — ESCITALOPRAM SCH MG: 10 TABLET, FILM COATED ORAL at 08:05

## 2018-05-31 RX ADMIN — DORZOLAMIDE HYDROCHLORIDE AND TIMOLOL MALEATE SCH DROP: 20; 5 SOLUTION/ DROPS OPHTHALMIC at 20:07

## 2018-05-31 RX ADMIN — NYSTATIN SCH ML: 500000 SUSPENSION ORAL at 10:03

## 2018-05-31 RX ADMIN — NYSTATIN SCH ML: 500000 SUSPENSION ORAL at 20:08

## 2018-05-31 RX ADMIN — LEVALBUTEROL HYDROCHLORIDE SCH VIAL: 1.25 SOLUTION RESPIRATORY (INHALATION) at 17:05

## 2018-05-31 RX ADMIN — ALPRAZOLAM PRN MG: 0.5 TABLET ORAL at 20:08

## 2018-05-31 RX ADMIN — ALPRAZOLAM PRN MG: 0.5 TABLET ORAL at 16:45

## 2018-05-31 RX ADMIN — CLINDAMYCIN PHOSPHATE SCH MLS/HR: 150 INJECTION, SOLUTION INTRAMUSCULAR; INTRAVENOUS at 13:12

## 2018-05-31 RX ADMIN — PANTOPRAZOLE SODIUM SCH MG: 40 TABLET, DELAYED RELEASE ORAL at 05:52

## 2018-05-31 RX ADMIN — METHYLPREDNISOLONE SODIUM SUCCINATE SCH MG: 40 INJECTION, POWDER, FOR SOLUTION INTRAMUSCULAR; INTRAVENOUS at 05:52

## 2018-05-31 RX ADMIN — TRAVOPROST SCH DROP: 0.04 SOLUTION/ DROPS OPHTHALMIC at 20:08

## 2018-05-31 RX ADMIN — MEGESTROL ACETATE SCH MG: 40 SUSPENSION ORAL at 08:04

## 2018-05-31 RX ADMIN — METHYLPREDNISOLONE SODIUM SUCCINATE SCH MG: 40 INJECTION, POWDER, FOR SOLUTION INTRAMUSCULAR; INTRAVENOUS at 13:12

## 2018-05-31 RX ADMIN — ANORECTAL OINTMENT SCH APPLIC: 15.7; .44; 24; 20.6 OINTMENT TOPICAL at 20:08

## 2018-05-31 RX ADMIN — CLINDAMYCIN PHOSPHATE SCH MLS/HR: 150 INJECTION, SOLUTION INTRAMUSCULAR; INTRAVENOUS at 20:08

## 2018-05-31 RX ADMIN — LEVALBUTEROL HYDROCHLORIDE SCH VIAL: 1.25 SOLUTION RESPIRATORY (INHALATION) at 04:25

## 2018-05-31 RX ADMIN — HYDROCODONE BITARTRATE AND ACETAMINOPHEN PRN TAB: 7.5; 325 TABLET ORAL at 08:07

## 2018-05-31 RX ADMIN — HYDROCODONE POLISTIREX AND CHLORPHENIRAMINE POLISTIREX SCH ML: 10; 8 SUSPENSION, EXTENDED RELEASE ORAL at 20:07

## 2018-05-31 RX ADMIN — ALPRAZOLAM PRN MG: 0.5 TABLET ORAL at 08:07

## 2018-05-31 RX ADMIN — Medication SCH: at 20:09

## 2018-05-31 RX ADMIN — SODIUM CHLORIDE SCH MLS/HR: 0.9 INJECTION, SOLUTION INTRAVENOUS at 01:50

## 2018-05-31 RX ADMIN — CARVEDILOL SCH: 6.25 TABLET, FILM COATED ORAL at 08:17

## 2018-05-31 RX ADMIN — Medication SCH MG: at 08:05

## 2018-05-31 RX ADMIN — CARVEDILOL SCH MG: 6.25 TABLET, FILM COATED ORAL at 08:04

## 2018-05-31 RX ADMIN — LEVALBUTEROL HYDROCHLORIDE SCH: 1.25 SOLUTION RESPIRATORY (INHALATION) at 11:12

## 2018-05-31 RX ADMIN — HYDROCODONE POLISTIREX AND CHLORPHENIRAMINE POLISTIREX SCH ML: 10; 8 SUSPENSION, EXTENDED RELEASE ORAL at 08:04

## 2018-05-31 RX ADMIN — METHYLPREDNISOLONE SODIUM SUCCINATE SCH MG: 40 INJECTION, POWDER, FOR SOLUTION INTRAMUSCULAR; INTRAVENOUS at 20:09

## 2018-05-31 RX ADMIN — DORZOLAMIDE HYDROCHLORIDE AND TIMOLOL MALEATE SCH DROP: 20; 5 SOLUTION/ DROPS OPHTHALMIC at 08:05

## 2018-05-31 RX ADMIN — PANTOPRAZOLE SODIUM SCH MG: 40 TABLET, DELAYED RELEASE ORAL at 16:44

## 2018-05-31 RX ADMIN — LEVALBUTEROL HYDROCHLORIDE SCH VIAL: 1.25 SOLUTION RESPIRATORY (INHALATION) at 22:19

## 2018-05-31 RX ADMIN — CLINDAMYCIN PHOSPHATE SCH MLS/HR: 150 INJECTION, SOLUTION INTRAMUSCULAR; INTRAVENOUS at 10:03

## 2018-05-31 RX ADMIN — CEFEPIME HYDROCHLORIDE SCH MLS/HR: 2 INJECTION, POWDER, FOR SOLUTION INTRAVENOUS at 21:42

## 2018-05-31 RX ADMIN — SODIUM CHLORIDE SCH: 0.9 INJECTION, SOLUTION INTRAVENOUS at 00:04

## 2018-05-31 RX ADMIN — NYSTATIN SCH ML: 500000 SUSPENSION ORAL at 05:52

## 2018-05-31 RX ADMIN — CEFEPIME HYDROCHLORIDE SCH MLS/HR: 2 INJECTION, POWDER, FOR SOLUTION INTRAVENOUS at 08:04

## 2018-05-31 RX ADMIN — POLYETHYLENE GLYCOL 3350 SCH GM: 17 POWDER, FOR SOLUTION ORAL at 08:04

## 2018-05-31 RX ADMIN — CARVEDILOL SCH MG: 12.5 TABLET, FILM COATED ORAL at 16:44

## 2018-06-01 VITALS — TEMPERATURE: 97.3 F | SYSTOLIC BLOOD PRESSURE: 88 MMHG | DIASTOLIC BLOOD PRESSURE: 42 MMHG

## 2018-06-01 RX ADMIN — NYSTATIN SCH: 500000 SUSPENSION ORAL at 21:43

## 2018-06-01 RX ADMIN — DORZOLAMIDE HYDROCHLORIDE AND TIMOLOL MALEATE SCH DROP: 20; 5 SOLUTION/ DROPS OPHTHALMIC at 09:15

## 2018-06-01 RX ADMIN — MORPHINE SULFATE PRN MG: 4 INJECTION INTRAVENOUS at 12:37

## 2018-06-01 RX ADMIN — LEVALBUTEROL HYDROCHLORIDE SCH VIAL: 1.25 SOLUTION RESPIRATORY (INHALATION) at 11:07

## 2018-06-01 RX ADMIN — CLINDAMYCIN PHOSPHATE SCH MLS/HR: 150 INJECTION, SOLUTION INTRAMUSCULAR; INTRAVENOUS at 13:54

## 2018-06-01 RX ADMIN — ANORECTAL OINTMENT SCH APPLIC: 15.7; .44; 24; 20.6 OINTMENT TOPICAL at 20:16

## 2018-06-01 RX ADMIN — PANTOPRAZOLE SODIUM SCH: 40 TABLET, DELAYED RELEASE ORAL at 17:37

## 2018-06-01 RX ADMIN — CEFEPIME HYDROCHLORIDE SCH MLS/HR: 2 INJECTION, POWDER, FOR SOLUTION INTRAVENOUS at 08:57

## 2018-06-01 RX ADMIN — NYSTATIN SCH ML: 500000 SUSPENSION ORAL at 11:47

## 2018-06-01 RX ADMIN — TRAVOPROST SCH: 0.04 SOLUTION/ DROPS OPHTHALMIC at 21:43

## 2018-06-01 RX ADMIN — DORZOLAMIDE HYDROCHLORIDE AND TIMOLOL MALEATE SCH: 20; 5 SOLUTION/ DROPS OPHTHALMIC at 21:42

## 2018-06-01 RX ADMIN — METHYLPREDNISOLONE SODIUM SUCCINATE SCH MG: 40 INJECTION, POWDER, FOR SOLUTION INTRAMUSCULAR; INTRAVENOUS at 20:15

## 2018-06-01 RX ADMIN — ANORECTAL OINTMENT SCH APPLIC: 15.7; .44; 24; 20.6 OINTMENT TOPICAL at 08:30

## 2018-06-01 RX ADMIN — CLINDAMYCIN PHOSPHATE SCH MLS/HR: 150 INJECTION, SOLUTION INTRAMUSCULAR; INTRAVENOUS at 20:16

## 2018-06-01 RX ADMIN — METHYLPREDNISOLONE SODIUM SUCCINATE SCH MG: 40 INJECTION, POWDER, FOR SOLUTION INTRAMUSCULAR; INTRAVENOUS at 13:54

## 2018-06-01 RX ADMIN — HYDROCODONE POLISTIREX AND CHLORPHENIRAMINE POLISTIREX SCH ML: 10; 8 SUSPENSION, EXTENDED RELEASE ORAL at 09:07

## 2018-06-01 RX ADMIN — CLINDAMYCIN PHOSPHATE SCH MLS/HR: 150 INJECTION, SOLUTION INTRAMUSCULAR; INTRAVENOUS at 04:13

## 2018-06-01 RX ADMIN — LEVALBUTEROL HYDROCHLORIDE SCH VIAL: 1.25 SOLUTION RESPIRATORY (INHALATION) at 04:53

## 2018-06-01 RX ADMIN — LEVALBUTEROL HYDROCHLORIDE SCH VIAL: 1.25 SOLUTION RESPIRATORY (INHALATION) at 21:35

## 2018-06-01 RX ADMIN — Medication SCH: at 09:09

## 2018-06-01 RX ADMIN — CARVEDILOL SCH: 12.5 TABLET, FILM COATED ORAL at 17:34

## 2018-06-01 RX ADMIN — HYDROCODONE POLISTIREX AND CHLORPHENIRAMINE POLISTIREX SCH: 10; 8 SUSPENSION, EXTENDED RELEASE ORAL at 21:43

## 2018-06-01 RX ADMIN — SODIUM CHLORIDE SCH MLS/HR: 0.9 INJECTION, SOLUTION INTRAVENOUS at 09:26

## 2018-06-01 RX ADMIN — CARVEDILOL SCH MG: 12.5 TABLET, FILM COATED ORAL at 08:55

## 2018-06-01 RX ADMIN — KETOROLAC TROMETHAMINE SCH MG: 30 INJECTION, SOLUTION INTRAMUSCULAR; INTRAVENOUS at 14:29

## 2018-06-01 RX ADMIN — MORPHINE SULFATE PRN MG: 4 INJECTION INTRAVENOUS at 22:21

## 2018-06-01 RX ADMIN — MORPHINE SULFATE PRN MG: 4 INJECTION INTRAVENOUS at 20:13

## 2018-06-01 RX ADMIN — NYSTATIN SCH: 500000 SUSPENSION ORAL at 17:36

## 2018-06-01 RX ADMIN — LEVALBUTEROL HYDROCHLORIDE SCH: 1.25 SOLUTION RESPIRATORY (INHALATION) at 17:09

## 2018-06-01 RX ADMIN — CEFEPIME HYDROCHLORIDE SCH MLS/HR: 2 INJECTION, POWDER, FOR SOLUTION INTRAVENOUS at 21:39

## 2018-06-01 RX ADMIN — SODIUM CHLORIDE SCH: 0.9 INJECTION, SOLUTION INTRAVENOUS at 15:00

## 2018-06-01 RX ADMIN — KETOROLAC TROMETHAMINE SCH MG: 30 INJECTION, SOLUTION INTRAMUSCULAR; INTRAVENOUS at 20:15

## 2018-06-01 RX ADMIN — MORPHINE SULFATE PRN MG: 4 INJECTION INTRAVENOUS at 16:46

## 2018-06-01 RX ADMIN — HYDROCODONE BITARTRATE AND ACETAMINOPHEN PRN TAB: 7.5; 325 TABLET ORAL at 04:15

## 2018-06-01 RX ADMIN — PANTOPRAZOLE SODIUM SCH MG: 40 TABLET, DELAYED RELEASE ORAL at 05:41

## 2018-06-01 RX ADMIN — Medication SCH: at 14:58

## 2018-06-01 RX ADMIN — NYSTATIN SCH ML: 500000 SUSPENSION ORAL at 05:41

## 2018-06-01 RX ADMIN — METHYLPREDNISOLONE SODIUM SUCCINATE SCH MG: 40 INJECTION, POWDER, FOR SOLUTION INTRAMUSCULAR; INTRAVENOUS at 05:42

## 2018-06-02 RX ADMIN — PANTOPRAZOLE SODIUM SCH: 40 TABLET, DELAYED RELEASE ORAL at 06:28

## 2018-06-02 RX ADMIN — LEVALBUTEROL HYDROCHLORIDE SCH: 1.25 SOLUTION RESPIRATORY (INHALATION) at 05:15

## 2018-06-02 RX ADMIN — KETOROLAC TROMETHAMINE SCH MG: 30 INJECTION, SOLUTION INTRAMUSCULAR; INTRAVENOUS at 04:25

## 2018-06-02 RX ADMIN — MORPHINE SULFATE PRN MG: 4 INJECTION INTRAVENOUS at 03:44

## 2018-06-02 RX ADMIN — CLINDAMYCIN PHOSPHATE SCH MLS/HR: 150 INJECTION, SOLUTION INTRAMUSCULAR; INTRAVENOUS at 04:25

## 2018-06-02 RX ADMIN — METHYLPREDNISOLONE SODIUM SUCCINATE SCH MG: 40 INJECTION, POWDER, FOR SOLUTION INTRAMUSCULAR; INTRAVENOUS at 04:25

## 2018-06-02 RX ADMIN — MORPHINE SULFATE PRN MG: 4 INJECTION INTRAVENOUS at 00:54

## 2018-06-02 RX ADMIN — NYSTATIN SCH: 500000 SUSPENSION ORAL at 06:28

## 2018-06-02 NOTE — HP
DATE OF SERVICE:  06/01/18 (05/31/18 - ADMITTED TO SWING BED)



HISTORY OF PRESENT ILLNESS:

This is a 72-year-old white female with a history of right upper lobe lung 
cancer who has been in and out of the hospital over the past several months 
with recurrent pneumonia. Most recently she has been under the care of Dr. Bailey for the past 10 days or so for the treatment of right upper lobe, right 
middle lobe and lower lobe pneumonia. She had acute kidney injury which has 
since resolved. Her condition has steadily declined over the course of her 
hospital stay. She has been admitted to swing bed to continue IV antibiotics 
and likely transition to comfort care if no improvement. 



PAST MEDICAL/SURGICAL HISTORY: 

Recurrent right lobe pneumonia

Cancer of the right lung (she sees Dr. Marie). She completed all of her 
radiation however was still completing chemotherapy.

Chronic bronchitis

COPD

Heavy smoker for a number of years

Degenerative joint disease of the spine

Hypertension

Dyslipidemia

Depression

Peripheral arterial disease

Carotid stenosis

Hysterectomy

Colonoscopy in June of 2017 - she has not had a mammogram



REVIEW OF SYSTEMS:

CONSTITUTIONAL: Positive for fatigue, malaise.  No night sweats.  No lethargy.  
No fever or chills.

HEENT:  Eyes:  No visual changes.  No eye pain.  No eye discharge.  ENT:  No 
runny nose.  No epistaxis.  No sinus pain.  No sore throat.  No odynophagia.  
No ear pain.  No congestion.

RESPIRATORY:  Cough, shortness of breath.  No hemoptysis. 

CARDIOVASCULAR:  No angina symptoms. No CHF symptoms.  No atypical chest pain 
for CAD.  No palpitations. No PND. No orthopnea.

GASTROINTESTINAL:  No abdominal pain.  No nausea or vomiting.  No diarrhea or 
constipation.  No hematemesis.  No hematochezia.  

GENITOURINARY:  No urgency.  No frequency.  No dysuria.  No hematuria.  No 
obstructive symptoms.  No discharge.  No pain.  No significant abnormal 
bleeding.

MUSCULOSKELETAL: Generalized weakness. 

NEUROLOGICAL:  No headache. No neck pain. No syncope. No seizures.  No 
dizziness. 

PSYCHIATRIC:   Not anxious. No depression.  No suicidal thoughts. No homicidal 
thoughts. 

SKIN:  No rash.  No lesions.  No wounds.

ENDOCRINE:  No unexplained weight loss.  No weight gain. 

HEMATOLOGIC/LYMPHATIC:  No anemia.  No purpura.  No petechiae.  No prolonged or 
excessive bleeding.  No palpable lymph nodes.



PERSONAL/FAMILY/SOCIAL HISTORY:

She is . She lives at home. Her son currently lives with her. She is a 
long-term smoker, no alcohol or illicit drug use. 



MEDICATIONS: 

Clindamycin 300 mg t.i.d. IV

Lasix 20 mg IV times one dose today

Coreg 12.5 mg p.o. b.i.d. (increased from 6.25 mg p.o. b.i.d.)

Nebulizer treatments

IV steroids



ALLERGIES: NKDA



PHYSICAL EXAMINATION: 

GENERAL:  The patient is alert at times, very drowsy but arousable. She has had 
confusion off and on at times. She is very pale, short of breath. No acute 
distress. 

VITAL SIGNS:  

HEENT:  Head normocephalic, atraumatic.  Eyes: Extraocular muscles are intact.  
Pupils are equal, round and reactive to light and accommodation.  Ears:  No 
lesions.  Nose appeared normal. Throat: No exudate or erythema.

NECK: Supple. No JVD, no carotid bruit.  No lymphadenopathy or thyromegaly. 

LUNGS:  Severely diminished. Coarse lungs sounds right lobe and left lower 
lobe.   Percussion note normal.  Chest  symmetrical. 

HEART:  S1, S2, no S3. No murmurs.  No cyanosis or clubbing.  No ascites.  
Pulses: Dorsalis pedis and posterior tibial pulses +1 to +2 bilaterally. 

ABDOMEN:  Soft.  Nontender.  Bowel sounds active. No CVA tenderness. No mass 
felt. 

EXTREMITIES:  No edema.  Full range of motion of all extremities, equal. 

NEUROLOGIC:  No focal deficit. Cranial nerves II through XII are grossly 
intact.  No headache, no double vision or headache. 

SKIN: Not dry.  Intact.  Turgor - normal. 

LYMPHATIC:  No palpable lymph nodes/no lymphedema. 

MUSCULOSKELETAL:  Normal joints with no swelling. Muscle tone is normal.



LABS:

None today.



ASSESSMENT:

1.  BILATERAL PNEUMONIA 

2.  LUNG CANCER, RIGHT UPPER LOBE

3.  SEVERE COPD

4.  LONG HISTORY OF SMOKER

5.  DEGENERATIVE JOINT DISEASE

6.  HYPERTENSION

7.  DYSLIPIDEMIA

8.  DEPRESSION

9.  PAD

10. FAILURE TO THRIVE



PLAN:

Will admit to swing bed.  It has been discussed at length with the family that 
her prognosis is poor. Her condition has steadily deteriorated despite 
aggressive antibiotic therapy. At one point as an inpatient, she was on three 
different antibiotic medications which she developed acute kidney injury. We 
are still treating her with IV antibiotics and IV steroids. This will continue. 
Her kidney function has returned down to normal. We will continue with IV 
fluids as she is not eating or drinking. Her urine output is very poor, dark 
colored urine. We have discussed the possibility of Hospice, comfort measures. 
She is complaining of pain today. She did have elevated amylase but not lipase 
as if she has resolving pancreatitis. She has been complaining of abdominal 
pain. Will start Morphine 2 mg every 2 hours p.r.n. IV as well as Toradol 30 mg 
IV q.8hr scheduled along with 1 cc of Decadron, Zofran for nausea. We will 
continue to monitor her closely. Again, her prognosis is poor. We have 
discussed this in detail with the family. 



TIME SPENT: More than 70 minutes. 
HOMERO

## 2018-06-05 ENCOUNTER — APPOINTMENT (OUTPATIENT)
Dept: ONCOLOGY | Facility: HOSPITAL | Age: 73
End: 2018-06-05

## 2018-06-06 NOTE — DS
DATE OF SERVICE:  06/02/18



CAUSE OF DEATH:

Cancer of the lung 



FINAL DIAGNOSIS: 

1.  Respiratory failure 

2.  Bilateral pneumonia 

3.  Acute pancreatis 

4.  Severe chronic lung disease

5.  C of the lung 

6.  Smoking 



HOSPITAL COURSE:

Kriss Greenberg was hospitalized with cute respiratory failure with pneumonia and 
the patient also had acute pancreatitis. The patient was treated with IV 
antibiotics and steroids. Also was treated with NPO for acute pancreatitis 
which seemed to have improved but at the same time her respiratory status 
initially after improvement to some extent deteriorated and was put on the 
swing bed for continued IV antibiotics. The patient was also given NEBS 
treatment, steroids. The patient has also been followed hematologist/oncologist 
and radiation specialist. The patient had endstage lung disease with C of the 
lung. She decided not to get transferred for further care to any tertiary 
center. She was a DNR. The patient passed away because of respiratory failure 
with underlined C of the lung with pneumonia. 



TIME SPENT:  More than 60 minutes. 
MTDD

## 2018-06-12 ENCOUNTER — APPOINTMENT (OUTPATIENT)
Dept: ONCOLOGY | Facility: HOSPITAL | Age: 73
End: 2018-06-12

## 2018-06-22 NOTE — DS
DATE OF SERVICE:  05/31/18 



FINAL DIAGNOSIS: 

1.   PNEUMONIA, RIGHT MIDDLE, UPPER AND LOWER LOBE, PSEUDOMONAS POSITIVE

2.   LUNG CANCER, STATUS POST RADIATION AND CHEMOTHERAPY, DR. JEROME

3.   ACUTE RENAL FAILURE, IMPROVING

4.   COLITIS/ENTERITIS PER CT

5.   ANEMIA NEEDING BLOOD TRANSFUSION

6.   CAD STATUS POST BYPASS SURGERY

7.   HYPERTENSION

8.   DYSLIPIDEMIA

9.   INFRARENAL FUSIFORM AAA

10.  COPD

11.  CERVICAL CANCER STATUS POST HYSTERECTOMY

12.  DIVERTICULOSIS

13.  ANXIETY/DEPRESSION

14.  OSTEOARTHRITIS

15.  DJD SPINE



DISCHARGE INSTRUCTIONS:

Admit the patient to the Transitional Care Unit. 



MEDICATIONS AT DISCHARGE:

Continue Cefepime

Solu-Medrol

Pain medication Demerol, Norco

Xopenex

Medication Management, labs

Coreg 12.5 mg twice a day



NEW PRESCRIPTIONS:

None



DIET INSTRUCTIONS: 

Cardiac and Healthy



ACTIVITY:

Complete bedrest



SMOKING:

N/A



DISEASE SPECIFIC EDUCATION:

Pneumonia, antibiotic use and diarrhea have been discussed, verbalized 
understanding.



HOSPITAL COURSE:

This is a 72-year-old female recently discharged from the hospital by Dr. Veloz. She came back to the emergency room with abdominal pain, nausea, cough, 
congestion, and shortness of breath. She was found to have acute pancreatitis 
and right side upper lobe, middle lobe and lower lobe pneumonia.  The patient 
was admitted to the hospital, started on Rocephin, Vancomycin because of the 
health care facility acquired pneumonia. Colitis was seen on the CT scan so 
Flagyl was given. The patient stay was complicated with acute renal failure, 
BUN and creatinine went up to 70 and 2.65. Urine output was scanty. Amylase, 
lipase was high. Amylase 560. ABG was done that showed pH 7.455, pc02 28.4, p02 
110. Gradually the white count was getting better. Hemoglobin dropped to 7.5. 
We had to give 2 units of blood transfusion. The patient's platelet count 
gradually dropping to 120, 108, 66, 72, 40.  She started having leg edema and 
swelling, some cough and congestion. The patient was complaining that she was 
not feeling good today.  Given the patient's persistent pneumonia, needing IV 
antibiotics, will plan putting the patient in Transitional Care Unit. 



SPECIFIC ORDERS:

1.  Continue telemetry



TIME SPENT: MORE THAN 65 MINUTES
MTDD

## 2020-09-03 ENCOUNTER — TELEPHONE (OUTPATIENT)
Dept: GASTROENTEROLOGY | Facility: CLINIC | Age: 75
End: 2020-09-03

## 2020-09-03 NOTE — TELEPHONE ENCOUNTER
PT IS PAST DUE FOR A REPEAT COLONOSCOPY. CALLED SON, DANIEL, AND LEFT  FOR HIM TO CALL US ANAYELI SUGGS TO SET AN OV APPT FOR HIS MOTHER.     LETTER SENT TO PCP.

## 2020-09-29 NOTE — PCM.PROG
Attending Provider: 


ATTENDING PROVIDER:


Dr. ASHLEIGH VELOZ


This patient is seen with Tami Melendez, Nurse Practitioner.





DATE OF SERVICE:  05/07/18





SUBJECTIVE: 


This 72 year old WHITE/ F was hospitalized 05/04/18.  The patient is 

sitting in chair, alert. still very weak with productive cough. Sputum is 

postiive for Pseudomonas.   She is afebrile. 





REVIEW OF SYSTEMS:


CONSTITUTIONAL:  Weakness.  No night sweats. No malaise, lethargy. No fever or 

chills.


HEENT: Eyes: No visual changes. No eye pain. No eye discharge. ENT: No runny 

nose. No epistaxis. No sinus pain. No odynophagia. No congestion.


RESPIRATORY: Cough and shortness of breath. No hemoptysis.


CARDIOVASCULAR: No angina symptoms. No CHF symptoms. No atypical chest pain for 

CAD. No palpitations. No orthopnea..


GASTROINTESTINAL: No abdominal pain. No nausea or vomiting. No diarrhea or 

constipation. No hematemesis. No hematochezia.


GENITOURINARY: No urgency. No frequency. No dysuria. No hematuria. No 

obstructive symptoms. No discharge. No pain. No significant abnormal bleeding.


MUSCULOSKELETAL: No musculoskeletal pain; no joint swelling. 


NEUROLOGICAL: Awake, alert, oriented to time, place and person. No headache. No 

neck pain. No syncope. No seizures. No dizziness.


PSYCHIATRIC: Not anxious. No depression. No suicidal thoughts. No homicidal 

thoughts.


SKIN:  No rash. No lesions. No wounds.


ENDOCRINE: No unexplained weight loss. No weight gain. 


HEMATOLOGIC/LYMPHATIC: No anemia. No purpura. No petechiae. No prolonged or 

excessive bleeding. No palpable lymph nodes.





PHYSICAL EXAMINATION:


GENERAL:  The patient is awake, alert and oriented, sitting in chair in no 

distress. 


VITAL SIGNS:  Temperature 98 F, Pulse 68, Respiratory Rate 24, /62, Pulse 

Ox 97%


HEENT:  Head normocephalic, atraumatic.  Eyes: Extraocular muscles are intact.  

Pupils are equal, round and reactive to light and accommodation.  Ears:  No 

lesions.  Nose appeared normal. Throat: No exudate or erythema.


NECK: Supple. No JVD, no carotid bruit.  No lymphadenopathy or thyromegaly. 


LUNGS: Severely diminished breath sounds, worse on right with inspiratory and 

expiratory wheexe on the right.  Percussion note normal.  Chest symmetrical. 


HEART:  S1, S2, no S3. No murmurs.  No cyanosis or clubbing.  No ascites.  

Pulses: Dorsalis pedis and posterior tibial pulses +1 to +2 both sides. 


ABDOMEN:  Soft.  Non-tender.  Bowel sounds active. No CVA tenderness. No mass 

felt. 


EXTREMITIES:  No edema.  Full range of motion of all extremities, equal. 


NEUROLOGIC:  No focal deficit. Cranial nerves II through XII are grossly 

intact.  No headache, no double vision or headache.  


SKIN: Not dry.  Intact.  Turgor-normal. 


LYMPHATIC:  No palpable lymph nodes/no lymphedema. 


MUSCULOSKELETAL:  Normal joints with no swelling. Muscle tone is normal. 








LAB REVIEW:





 05/07/18 04:15 





 05/07/18 04:15 











05/07/18 04:15: Sodium 137, Potassium 4.6, Chloride 98, Carbon Dioxide 30, 

Anion Gap 13.6, BUN 11, Creatinine 0.77, Estimated GFR (MDRD) 73.00, BUN/

Creatinine Ratio 14.28, Glucose 139 H, Calcium 8.6, Total Bilirubin 0.3, AST 19

, ALT 23, Alkaline Phosphatase 43 L, Total Protein 5.1 L, Albumin 2.0 L, 

Globulin 3.1, Albumin/Globulin Ratio 0.65


05/07/18 04:15: WBC 6.66, RBC 2.50 L, Hgb 8.2 L, Hct 25.5 L, .0 H, MCH 

32.8 H, MCHC 32.2, RDW Coeff of Federico 19.4 H, Plt Count 175, Immature Gran % (Auto

) 5.0, Neut % (Auto) 83.0, Lymph % (Auto) 6.8 L, Mono % (Auto) 5.0, Eos % (Auto

) 0.0, Baso % (Auto) 0.2, Immature Gran # (Auto) 0.3, Neut # (Auto) 5.5, Lymph 

# (Auto) 0.5 L, Mono # (Auto) 0.3 L, Eos # (Auto) 0.0, Baso # (Auto) 0.0





ASSESSMENT: 


1.  Right  upper and middle lobe pneumonia


2.  Right upper lobe malignancy


3.  Anemia


4.  Generalized weakness





PLAN:


1.  Increase Solu-Medrol 40 mg q.8 hr


2.  Continue Duonebs








Plan and coordination of the patient's care discussed in the presence of Case 

Manager and nurse.











CONDITION:  Stable











SCRIBED BY:


IRIS CANELA,  scribed while in presence of service performed by 

Dr. Veloz/LOIS Siegel on 05/07/18 (9700) Continue Straight Catheterization q 4hrs due to large volume retention   Patient will need [place]maña on Discharge   Continue Cardura 8 mg HS

## 2024-04-04 NOTE — CT
EXAM:  CT Abdomen without contrast.  CT Pelvis without contrast. 

  

HISTORY:  Anemia. 

  

COMPARISON:  12/19/2016. 

  

TECHNIQUE:  Multiple axial images of the abdomen and pelvis were obtained without intravenous contra
st.  Images were reformatted in the coronal plane. 

  

  

FINDINGS:  Please note that evaluation of the abdominal and pelvic structures is limited due to lack
 of intravenous contrast. 

  

No acute abnormality identified in the lung bases.  Degenerative changes are present in the spine. 

  

The liver, gallbladder, pancreas, spleen, and adrenal glands demonstrate normal contour.  Left kidne
y is smaller on the right.  No calcified renal stones or hydronephrosis detected. 

  

The bowel is normal in course and caliber without evidence for obstruction or inflammatory process. 
 The appendix is normal.  Uterus is absent.  Urinary bladder is unremarkable.  No free fluid or free
 air identified.  Atherosclerotic calcifications are present.  There is fusiform dilatation of the i
nfrarenal abdominal aorta to a maximum diameter of 3.3 cm. 

  

---------------------------- 

IMPRESSION: 

1.  No acute abnormality within the abdomen or pelvis. 

2.  Atherosclerosis with 3.3 cm fusiform infrarenal abdominal aortic aneurysm. intact

## (undated) DEVICE — MSK O2 MD CONCENTR A/ LF 7FT 1P/U

## (undated) DEVICE — ENDOGATOR AUXILIARY WATER JET CONNECTOR: Brand: ENDOGATOR

## (undated) DEVICE — SUTURE VCRL SZ 2-0 L27IN ABSRB UD L26MM SH 1/2 CIR J417H

## (undated) DEVICE — SUTURE VCRL SZ 4-0 L18IN ABSRB UD L19MM PS-2 3/8 CIR PRIM J496H

## (undated) DEVICE — CHLORAPREP 26ML ORANGE

## (undated) DEVICE — SUTURE VCRL SZ 3-0 L27IN ABSRB UD L26MM SH 1/2 CIR J416H

## (undated) DEVICE — 3M™ STERI-STRIP™ REINFORCED ADHESIVE SKIN CLOSURES, R1546, 1/4 IN X 4 IN (6 MM X 100 MM), 10 STRIPS/ENVELOPE: Brand: 3M™ STERI-STRIP™

## (undated) DEVICE — SPONGE GZ W4XL4IN RAYON POLY FILL CVR W/ NONWOVEN FAB

## (undated) DEVICE — C-ARM: Brand: UNBRANDED

## (undated) DEVICE — STERILE POLYISOPRENE POWDER-FREE SURGICAL GLOVES: Brand: PROTEXIS

## (undated) DEVICE — CUFF,BP,DISP,1 TUBE,ADULT,HP: Brand: MEDLINE

## (undated) DEVICE — TBG SMPL FLTR LINE NASL 02/C02 A/ BX/100

## (undated) DEVICE — Device: Brand: DEFENDO AIR/WATER/SUCTION AND BIOPSY VALVE

## (undated) DEVICE — ADHESIVE LIQ MASTISOL ST

## (undated) DEVICE — 3M™ TEGADERM™ TRANSPARENT FILM DRESSING FRAME STYLE, 1626, 4 IN X 4-3/4 IN (10 CM X 12 CM), 50/CT 4CT/CASE: Brand: 3M™ TEGADERM™

## (undated) DEVICE — STERILE LATEX POWDER FREE SURGICAL GLOVES WITH HYDROGEL COATING: Brand: PROTEXIS

## (undated) DEVICE — ENCORE® LATEX MICRO SIZE 6, STERILE LATEX POWDER-FREE SURGICAL GLOVE: Brand: ENCORE

## (undated) DEVICE — 9165 UNIVERSAL PATIENT PLATE: Brand: 3M™

## (undated) DEVICE — SENSR O2 OXIMAX FNGR A/ 18IN NONSTR

## (undated) DEVICE — THREE QUARTER SHEET: Brand: CONVERTORS

## (undated) DEVICE — ENCORE® LATEX MICRO SIZE 6.5, STERILE LATEX POWDER-FREE SURGICAL GLOVE: Brand: ENCORE

## (undated) DEVICE — THE CHANNEL CLEANING BRUSH IS A NYLON FLEXI BRUSH ATTACHED TO A FLEXIBLE PLASTIC SHEATH DESIGNED TO SAFELY REMOVE DEBRIS FROM FLEXIBLE ENDOSCOPES.

## (undated) DEVICE — MAJOR BSIN SETUP PK

## (undated) DEVICE — STANDARD SURGICAL GOWN, L: Brand: CONVERTORS